# Patient Record
Sex: FEMALE | Race: WHITE | Employment: FULL TIME | URBAN - METROPOLITAN AREA
[De-identification: names, ages, dates, MRNs, and addresses within clinical notes are randomized per-mention and may not be internally consistent; named-entity substitution may affect disease eponyms.]

---

## 2021-06-25 PROBLEM — Z34.80 PRENATAL CARE OF MULTIGRAVIDA, ANTEPARTUM: Status: ACTIVE | Noted: 2021-06-25

## 2021-07-12 ENCOUNTER — HOSPITAL ENCOUNTER (EMERGENCY)
Age: 31
Discharge: HOME | End: 2021-07-12
Attending: EMERGENCY MEDICINE
Payer: COMMERCIAL

## 2021-07-12 VITALS
DIASTOLIC BLOOD PRESSURE: 68 MMHG | SYSTOLIC BLOOD PRESSURE: 116 MMHG | BODY MASS INDEX: 30.22 KG/M2 | HEART RATE: 88 BPM | WEIGHT: 188 LBS | OXYGEN SATURATION: 100 % | RESPIRATION RATE: 16 BRPM | HEIGHT: 66 IN | TEMPERATURE: 98 F

## 2021-07-12 DIAGNOSIS — O21.9 NAUSEA AND VOMITING DURING PREGNANCY: Primary | ICD-10-CM

## 2021-07-12 LAB
ALBUMIN SERPL-MCNC: 3.4 G/DL (ref 3.5–5)
ALBUMIN/GLOB SERPL: 0.7 {RATIO} (ref 1.2–3.5)
ALP SERPL-CCNC: 51 U/L (ref 50–136)
ALT SERPL-CCNC: 28 U/L (ref 12–65)
ANION GAP SERPL CALC-SCNC: 11 MMOL/L (ref 7–16)
AST SERPL-CCNC: 18 U/L (ref 15–37)
BACTERIA URNS QL MICRO: 0 /HPF
BASOPHILS # BLD: 0 K/UL (ref 0–0.2)
BASOPHILS NFR BLD: 0 % (ref 0–2)
BILIRUB SERPL-MCNC: 0.4 MG/DL (ref 0.2–1.1)
BUN SERPL-MCNC: 8 MG/DL (ref 6–23)
CALCIUM SERPL-MCNC: 8.7 MG/DL (ref 8.3–10.4)
CASTS URNS QL MICRO: 0 /LPF
CHLORIDE SERPL-SCNC: 102 MMOL/L (ref 98–107)
CO2 SERPL-SCNC: 24 MMOL/L (ref 21–32)
CREAT SERPL-MCNC: 0.67 MG/DL (ref 0.6–1)
DIFFERENTIAL METHOD BLD: NORMAL
EOSINOPHIL # BLD: 0.1 K/UL (ref 0–0.8)
EOSINOPHIL NFR BLD: 1 % (ref 0.5–7.8)
EPI CELLS #/AREA URNS HPF: NORMAL /HPF
ERYTHROCYTE [DISTWIDTH] IN BLOOD BY AUTOMATED COUNT: 12.2 % (ref 11.9–14.6)
GLOBULIN SER CALC-MCNC: 4.6 G/DL (ref 2.3–3.5)
GLUCOSE SERPL-MCNC: 70 MG/DL (ref 65–100)
HCT VFR BLD AUTO: 43 % (ref 35.8–46.3)
HGB BLD-MCNC: 14.4 G/DL (ref 11.7–15.4)
IMM GRANULOCYTES # BLD AUTO: 0 K/UL (ref 0–0.5)
IMM GRANULOCYTES NFR BLD AUTO: 0 % (ref 0–5)
LIPASE SERPL-CCNC: 145 U/L (ref 73–393)
LYMPHOCYTES # BLD: 4 K/UL (ref 0.5–4.6)
LYMPHOCYTES NFR BLD: 39 % (ref 13–44)
MAGNESIUM SERPL-MCNC: 2.2 MG/DL (ref 1.8–2.4)
MCH RBC QN AUTO: 30.1 PG (ref 26.1–32.9)
MCHC RBC AUTO-ENTMCNC: 33.5 G/DL (ref 31.4–35)
MCV RBC AUTO: 89.8 FL (ref 79.6–97.8)
MONOCYTES # BLD: 0.8 K/UL (ref 0.1–1.3)
MONOCYTES NFR BLD: 7 % (ref 4–12)
NEUTS SEG # BLD: 5.4 K/UL (ref 1.7–8.2)
NEUTS SEG NFR BLD: 53 % (ref 43–78)
NRBC # BLD: 0 K/UL (ref 0–0.2)
PLATELET # BLD AUTO: 251 K/UL (ref 150–450)
PMV BLD AUTO: 9.6 FL (ref 9.4–12.3)
POTASSIUM SERPL-SCNC: 3.5 MMOL/L (ref 3.5–5.1)
PROT SERPL-MCNC: 8 G/DL (ref 6.3–8.2)
RBC # BLD AUTO: 4.79 M/UL (ref 4.05–5.2)
RBC #/AREA URNS HPF: NORMAL /HPF
SODIUM SERPL-SCNC: 137 MMOL/L (ref 136–145)
WBC # BLD AUTO: 10.3 K/UL (ref 4.3–11.1)
WBC URNS QL MICRO: NORMAL /HPF

## 2021-07-12 PROCEDURE — 85025 COMPLETE CBC W/AUTO DIFF WBC: CPT

## 2021-07-12 PROCEDURE — 83690 ASSAY OF LIPASE: CPT

## 2021-07-12 PROCEDURE — 99284 EMERGENCY DEPT VISIT MOD MDM: CPT

## 2021-07-12 PROCEDURE — 81015 MICROSCOPIC EXAM OF URINE: CPT

## 2021-07-12 PROCEDURE — 96376 TX/PRO/DX INJ SAME DRUG ADON: CPT

## 2021-07-12 PROCEDURE — 83735 ASSAY OF MAGNESIUM: CPT

## 2021-07-12 PROCEDURE — 96361 HYDRATE IV INFUSION ADD-ON: CPT

## 2021-07-12 PROCEDURE — 81003 URINALYSIS AUTO W/O SCOPE: CPT

## 2021-07-12 PROCEDURE — 74011250636 HC RX REV CODE- 250/636: Performed by: PHYSICIAN ASSISTANT

## 2021-07-12 PROCEDURE — 80053 COMPREHEN METABOLIC PANEL: CPT

## 2021-07-12 PROCEDURE — 96374 THER/PROPH/DIAG INJ IV PUSH: CPT

## 2021-07-12 RX ORDER — ONDANSETRON 2 MG/ML
4 INJECTION INTRAMUSCULAR; INTRAVENOUS
Status: COMPLETED | OUTPATIENT
Start: 2021-07-12 | End: 2021-07-12

## 2021-07-12 RX ORDER — ONDANSETRON 4 MG/1
4 TABLET, ORALLY DISINTEGRATING ORAL
Qty: 12 TABLET | Refills: 0 | Status: SHIPPED | OUTPATIENT
Start: 2021-07-12 | End: 2021-07-16

## 2021-07-12 RX ORDER — SODIUM CHLORIDE 0.9 % (FLUSH) 0.9 %
5-10 SYRINGE (ML) INJECTION AS NEEDED
Status: DISCONTINUED | OUTPATIENT
Start: 2021-07-12 | End: 2021-07-12 | Stop reason: HOSPADM

## 2021-07-12 RX ORDER — SODIUM CHLORIDE 0.9 % (FLUSH) 0.9 %
5-10 SYRINGE (ML) INJECTION EVERY 8 HOURS
Status: DISCONTINUED | OUTPATIENT
Start: 2021-07-12 | End: 2021-07-12 | Stop reason: HOSPADM

## 2021-07-12 RX ADMIN — ONDANSETRON 4 MG: 2 INJECTION INTRAMUSCULAR; INTRAVENOUS at 17:22

## 2021-07-12 RX ADMIN — ONDANSETRON 4 MG: 2 INJECTION INTRAMUSCULAR; INTRAVENOUS at 18:10

## 2021-07-12 RX ADMIN — SODIUM CHLORIDE 1000 ML: 900 INJECTION, SOLUTION INTRAVENOUS at 17:21

## 2021-07-12 NOTE — ED TRIAGE NOTES
Pt states she is 12 weeks pregnant and has been vomiting all food and liquids since last night. States she feels like she needs some fluids. Masked for triage.

## 2021-07-12 NOTE — DISCHARGE INSTRUCTIONS
Please follow-up closely with the OB/GYN. You may continue to take Unisom and vitamin B6 for nausea/vomiting. You may take Zofran every 6-8 hours as needed for nausea/vomiting. Recommend trying the disintegrating tablets as these may be easier to ingest.  Return if you develop severely worsening or emergent symptoms such as intractable vomiting, severe abdominal or pelvic pain, vaginal bleeding or any other new concerns.

## 2021-07-12 NOTE — ED PROVIDER NOTES
This patient is a 69-year-old female who is approximately 12 weeks gestation who comes in today with concern for nausea and vomiting since yesterday. Patient reports that she has had morning sickness throughout her pregnancy but over the past 24 hours she has not been able to keep any food or liquids down. She reports she took B6, Unisom and Pepcid but had minimal symptomatic relief. She has not taken any of the Zofran that she was prescribed because she has taken it in the past and felt that it did not work very well. She denies urinary frequency or dysuria or fevers. She does report that she was having abdominal cramping yesterday but was constipated and when she had a bowel movement she had no further abdominal cramping. She denies any vaginal bleeding or pelvic pain. She reports she feels dehydrated and that she may need some fluids.            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    IBS (irritable bowel syndrome)     rpt colon due 2024    Kidney stone     Shingles     TMJ (dislocation of temporomandibular joint)     b/l    Tubular adenoma of colon 06/2019       Past Surgical History:   Procedure Laterality Date    HX BREAST LUMPECTOMY Left 2000    HX LEEP PROCEDURE  2015    HX TONSIL AND ADENOIDECTOMY  2004         Family History:   Problem Relation Age of Onset    Thyroid Disease Mother     No Known Problems Father     Hypertension Maternal Aunt     Breast Cancer Maternal Aunt 61    Hypertension Maternal Uncle     Colon Cancer Maternal Uncle 48    Diabetes Maternal Grandmother     Hypertension Maternal Grandmother     Diabetes Maternal Grandfather     Hypertension Maternal Grandfather     Thyroid Disease Paternal Grandmother        Social History     Socioeconomic History    Marital status:  Spouse name: Not on file    Number of children: Not on file    Years of education: Not on file    Highest education level: Not on file   Occupational History    Not on file   Tobacco Use    Smoking status: Never Smoker    Smokeless tobacco: Never Used   Substance and Sexual Activity    Alcohol use: No    Drug use: No    Sexual activity: Yes     Partners: Male     Birth control/protection: None     Comment: Patient is pregnant   Other Topics Concern     Service Not Asked    Blood Transfusions Not Asked    Caffeine Concern No     Comment: Minimal    Occupational Exposure Not Asked    Hobby Hazards Not Asked    Sleep Concern Not Asked    Stress Concern Not Asked    Weight Concern Not Asked    Special Diet Not Asked    Back Care Not Asked    Exercise Yes    Bike Helmet Not Asked    Seat Belt Yes    Self-Exams No   Social History Narrative    Denies physical or sexual abuse     Social Determinants of Health     Financial Resource Strain:     Difficulty of Paying Living Expenses:    Food Insecurity:     Worried About Running Out of Food in the Last Year:     Ran Out of Food in the Last Year:    Transportation Needs:     Lack of Transportation (Medical):  Lack of Transportation (Non-Medical):    Physical Activity:     Days of Exercise per Week:     Minutes of Exercise per Session:    Stress:     Feeling of Stress :    Social Connections:     Frequency of Communication with Friends and Family:     Frequency of Social Gatherings with Friends and Family:     Attends Jain Services:     Active Member of Clubs or Organizations:     Attends Club or Organization Meetings:     Marital Status:    Intimate Partner Violence:     Fear of Current or Ex-Partner:     Emotionally Abused:     Physically Abused:     Sexually Abused: ALLERGIES: Amoxicillin and Sulfa (sulfonamide antibiotics)    Review of Systems   Constitutional: Negative for chills and fever.    Respiratory: Negative for cough and shortness of breath. Cardiovascular: Negative for chest pain. Gastrointestinal: Positive for constipation, nausea and vomiting. Negative for abdominal pain. Genitourinary: Negative for decreased urine volume, dyspareunia, menstrual problem, pelvic pain, vaginal bleeding and vaginal discharge. Skin: Negative for color change. All other systems reviewed and are negative. Vitals:    07/12/21 1549   BP: 102/68   Pulse: 92   Resp: 16   Temp: 98 °F (36.7 °C)   SpO2: 99%   Weight: 85.3 kg (188 lb)   Height: 5' 6\" (1.676 m)            Physical Exam  Vitals and nursing note reviewed. Constitutional:       General: She is not in acute distress. Appearance: Normal appearance. She is not ill-appearing, toxic-appearing or diaphoretic. HENT:      Head: Normocephalic and atraumatic. Eyes:      Conjunctiva/sclera: Conjunctivae normal.   Cardiovascular:      Rate and Rhythm: Normal rate and regular rhythm. Pulses: Normal pulses. Pulmonary:      Effort: Pulmonary effort is normal. No respiratory distress. Breath sounds: Normal air entry. No stridor, decreased air movement or transmitted upper airway sounds. No decreased breath sounds. Abdominal:      General: Abdomen is flat. Palpations: Abdomen is soft. Tenderness: There is no abdominal tenderness. There is no right CVA tenderness or left CVA tenderness. Skin:     General: Skin is warm and dry. Neurological:      General: No focal deficit present. Mental Status: She is alert and oriented to person, place, and time. Mental status is at baseline. MDM  Number of Diagnoses or Management Options  Nausea and vomiting during pregnancy: new and requires workup  Diagnosis management comments: 27-year-old female comes in with nausea and vomiting. She reports she is approximately 12 weeks gestation with confirmed IUP at her OB/GYN office.   She has no abdominal pain and has a benign abdominal exam.  She has no vaginal bleeding reported or fevers. She is generally well-appearing though she reports she is feeling nauseous. We will give some IV fluids and Zofran and reassess the patient. Labs were ordered and were unremarkable including negative urinalysis. Patient feels better after Zofran. Will prescribe Zofran ODT as she only has the tablets at this point. Will recommend close follow-up with the OB/GYN and return precautions were discussed. Amount and/or Complexity of Data Reviewed  Tests in the medicine section of CPT®: reviewed and ordered    Risk of Complications, Morbidity, and/or Mortality  Presenting problems: moderate  Diagnostic procedures: low  Management options: low    Patient Progress  Patient progress: stable    ED Course as of Jul 12 1810   Mon Jul 12, 2021   6631 Patient reports she is feeling improved after fluids and 1 dose of Zofran. She does have some minor nausea still, will provide another dose of Zofran. No further episodes of vomiting since patient arrived.     [AR]      ED Course User Index  [AR] Theresa Burrows, [address]       Procedures

## 2021-07-12 NOTE — ED NOTES
I have reviewed discharge instructions with the patient. The patient verbalized understanding. Patient left ED via Discharge Method: ambulatory to Home with (insert name of family/friend, self, transport family). Opportunity for questions and clarification provided. Patient given 1 scripts. To continue your aftercare when you leave the hospital, you may receive an automated call from our care team to check in on how you are doing.  This is a free service and part of our promise to provide the best care and service to meet your aftercare needs. \" If you have questions, or wish to unsubscribe from this service please call [phone].  Thank you for Choosing our [facility] Emergency Department.

## 2022-01-10 ENCOUNTER — HOSPITAL ENCOUNTER (INPATIENT)
Age: 32
LOS: 3 days | Discharge: HOME | End: 2022-01-13
Attending: OBSTETRICS & GYNECOLOGY | Admitting: OBSTETRICS & GYNECOLOGY
Payer: COMMERCIAL

## 2022-01-10 DIAGNOSIS — Z34.90 ENCOUNTER FOR INDUCTION OF LABOR: ICD-10-CM

## 2022-01-10 DIAGNOSIS — Z34.80 PRENATAL CARE OF MULTIGRAVIDA, ANTEPARTUM: ICD-10-CM

## 2022-01-10 DIAGNOSIS — K43.9 ABDOMINAL WALL HERNIA: ICD-10-CM

## 2022-01-10 PROCEDURE — 65270000029 HC RM PRIVATE

## 2022-01-11 ENCOUNTER — ANESTHESIA (OUTPATIENT)
Dept: LABOR AND DELIVERY | Age: 32
End: 2022-01-11
Payer: COMMERCIAL

## 2022-01-11 ENCOUNTER — ANESTHESIA EVENT (OUTPATIENT)
Dept: LABOR AND DELIVERY | Age: 32
End: 2022-01-11
Payer: COMMERCIAL

## 2022-01-11 PROBLEM — K43.9 ABDOMINAL WALL HERNIA: Status: ACTIVE | Noted: 2022-01-11

## 2022-01-11 PROBLEM — Z34.90 ENCOUNTER FOR INDUCTION OF LABOR: Status: ACTIVE | Noted: 2022-01-11

## 2022-01-11 LAB
ABO + RH BLD: NORMAL
BLOOD GROUP ANTIBODIES SERPL: NORMAL
ERYTHROCYTE [DISTWIDTH] IN BLOOD BY AUTOMATED COUNT: 12.8 % (ref 11.9–14.6)
HCT VFR BLD AUTO: 35.8 % (ref 35.8–46.3)
HGB BLD-MCNC: 12.1 G/DL (ref 11.7–15.4)
MCH RBC QN AUTO: 30.9 PG (ref 26.1–32.9)
MCHC RBC AUTO-ENTMCNC: 33.8 G/DL (ref 31.4–35)
MCV RBC AUTO: 91.3 FL (ref 79.6–97.8)
NRBC # BLD: 0 K/UL (ref 0–0.2)
PLATELET # BLD AUTO: 241 K/UL (ref 150–450)
PMV BLD AUTO: 11 FL (ref 9.4–12.3)
RBC # BLD AUTO: 3.92 M/UL (ref 4.05–5.2)
SPECIMEN EXP DATE BLD: NORMAL
WBC # BLD AUTO: 8.7 K/UL (ref 4.3–11.1)

## 2022-01-11 PROCEDURE — A4300 CATH IMPL VASC ACCESS PORTAL: HCPCS | Performed by: NURSE ANESTHETIST, CERTIFIED REGISTERED

## 2022-01-11 PROCEDURE — 77030014125 HC TY EPDRL BBMI -B: Performed by: NURSE ANESTHETIST, CERTIFIED REGISTERED

## 2022-01-11 PROCEDURE — 59400 OBSTETRICAL CARE: CPT | Performed by: OBSTETRICS & GYNECOLOGY

## 2022-01-11 PROCEDURE — 85027 COMPLETE CBC AUTOMATED: CPT

## 2022-01-11 PROCEDURE — 74011250637 HC RX REV CODE- 250/637: Performed by: OBSTETRICS & GYNECOLOGY

## 2022-01-11 PROCEDURE — 74011250636 HC RX REV CODE- 250/636: Performed by: NURSE ANESTHETIST, CERTIFIED REGISTERED

## 2022-01-11 PROCEDURE — 76060000078 HC EPIDURAL ANESTHESIA: Performed by: OBSTETRICS & GYNECOLOGY

## 2022-01-11 PROCEDURE — 75410000003 HC RECOV DEL/VAG/CSECN EA 0.5 HR: Performed by: OBSTETRICS & GYNECOLOGY

## 2022-01-11 PROCEDURE — 3E0S3BZ INTRODUCTION OF ANESTHETIC AGENT INTO EPIDURAL SPACE, PERCUTANEOUS APPROACH: ICD-10-PCS | Performed by: OBSTETRICS & GYNECOLOGY

## 2022-01-11 PROCEDURE — 65270000029 HC RM PRIVATE

## 2022-01-11 PROCEDURE — 74011250636 HC RX REV CODE- 250/636: Performed by: OBSTETRICS & GYNECOLOGY

## 2022-01-11 PROCEDURE — 36415 COLL VENOUS BLD VENIPUNCTURE: CPT

## 2022-01-11 PROCEDURE — 3E033VJ INTRODUCTION OF OTHER HORMONE INTO PERIPHERAL VEIN, PERCUTANEOUS APPROACH: ICD-10-PCS | Performed by: OBSTETRICS & GYNECOLOGY

## 2022-01-11 PROCEDURE — 75410000002 HC LABOR FEE PER 1 HR: Performed by: OBSTETRICS & GYNECOLOGY

## 2022-01-11 PROCEDURE — 75410000000 HC DELIVERY VAGINAL/SINGLE: Performed by: OBSTETRICS & GYNECOLOGY

## 2022-01-11 PROCEDURE — 86900 BLOOD TYPING SEROLOGIC ABO: CPT

## 2022-01-11 RX ORDER — ONDANSETRON 8 MG/1
8 TABLET, ORALLY DISINTEGRATING ORAL
Status: DISCONTINUED | OUTPATIENT
Start: 2022-01-11 | End: 2022-01-13 | Stop reason: HOSPADM

## 2022-01-11 RX ORDER — SODIUM CHLORIDE 0.9 % (FLUSH) 0.9 %
5-40 SYRINGE (ML) INJECTION AS NEEDED
Status: DISCONTINUED | OUTPATIENT
Start: 2022-01-11 | End: 2022-01-11

## 2022-01-11 RX ORDER — DIPHENHYDRAMINE HCL 25 MG
25 CAPSULE ORAL
Status: DISCONTINUED | OUTPATIENT
Start: 2022-01-11 | End: 2022-01-13 | Stop reason: HOSPADM

## 2022-01-11 RX ORDER — LIDOCAINE HYDROCHLORIDE 20 MG/ML
JELLY TOPICAL
Status: DISCONTINUED | OUTPATIENT
Start: 2022-01-11 | End: 2022-01-11 | Stop reason: HOSPADM

## 2022-01-11 RX ORDER — ROPIVACAINE HYDROCHLORIDE 2 MG/ML
INJECTION, SOLUTION EPIDURAL; INFILTRATION; PERINEURAL AS NEEDED
Status: DISCONTINUED | OUTPATIENT
Start: 2022-01-11 | End: 2022-01-11 | Stop reason: HOSPADM

## 2022-01-11 RX ORDER — FENTANYL CITRATE 50 UG/ML
INJECTION, SOLUTION INTRAMUSCULAR; INTRAVENOUS
Status: COMPLETED
Start: 2022-01-11 | End: 2022-01-11

## 2022-01-11 RX ORDER — OXYTOCIN/RINGER'S LACTATE 30/500 ML
0-20 PLASTIC BAG, INJECTION (ML) INTRAVENOUS
Status: DISCONTINUED | OUTPATIENT
Start: 2022-01-11 | End: 2022-01-11

## 2022-01-11 RX ORDER — SODIUM CHLORIDE 0.9 % (FLUSH) 0.9 %
5-40 SYRINGE (ML) INJECTION EVERY 8 HOURS
Status: DISCONTINUED | OUTPATIENT
Start: 2022-01-11 | End: 2022-01-11

## 2022-01-11 RX ORDER — OXYTOCIN/RINGER'S LACTATE 30/500 ML
1-25 PLASTIC BAG, INJECTION (ML) INTRAVENOUS
Status: DISCONTINUED | OUTPATIENT
Start: 2022-01-11 | End: 2022-01-11

## 2022-01-11 RX ORDER — BUTORPHANOL TARTRATE 2 MG/ML
1 INJECTION INTRAMUSCULAR; INTRAVENOUS
Status: DISCONTINUED | OUTPATIENT
Start: 2022-01-11 | End: 2022-01-11 | Stop reason: HOSPADM

## 2022-01-11 RX ORDER — DEXTROSE, SODIUM CHLORIDE, SODIUM LACTATE, POTASSIUM CHLORIDE, AND CALCIUM CHLORIDE 5; .6; .31; .03; .02 G/100ML; G/100ML; G/100ML; G/100ML; G/100ML
125 INJECTION, SOLUTION INTRAVENOUS CONTINUOUS
Status: DISCONTINUED | OUTPATIENT
Start: 2022-01-11 | End: 2022-01-11

## 2022-01-11 RX ORDER — ZOLPIDEM TARTRATE 5 MG/1
5 TABLET ORAL
Status: DISCONTINUED | OUTPATIENT
Start: 2022-01-11 | End: 2022-01-13 | Stop reason: HOSPADM

## 2022-01-11 RX ORDER — OXYTOCIN/RINGER'S LACTATE 30/500 ML
87.3 PLASTIC BAG, INJECTION (ML) INTRAVENOUS AS NEEDED
Status: COMPLETED | OUTPATIENT
Start: 2022-01-11 | End: 2022-01-11

## 2022-01-11 RX ORDER — IBUPROFEN 800 MG/1
800 TABLET ORAL EVERY 6 HOURS
Status: DISCONTINUED | OUTPATIENT
Start: 2022-01-11 | End: 2022-01-13 | Stop reason: HOSPADM

## 2022-01-11 RX ORDER — NALOXONE HYDROCHLORIDE 0.4 MG/ML
0.4 INJECTION, SOLUTION INTRAMUSCULAR; INTRAVENOUS; SUBCUTANEOUS AS NEEDED
Status: DISCONTINUED | OUTPATIENT
Start: 2022-01-11 | End: 2022-01-12

## 2022-01-11 RX ORDER — HYDROCODONE BITARTRATE AND ACETAMINOPHEN 5; 325 MG/1; MG/1
1 TABLET ORAL
Status: DISCONTINUED | OUTPATIENT
Start: 2022-01-11 | End: 2022-01-13 | Stop reason: HOSPADM

## 2022-01-11 RX ORDER — LIDOCAINE HYDROCHLORIDE 10 MG/ML
1 INJECTION INFILTRATION; PERINEURAL
Status: DISCONTINUED | OUTPATIENT
Start: 2022-01-11 | End: 2022-01-11 | Stop reason: HOSPADM

## 2022-01-11 RX ORDER — FENTANYL CITRATE 50 UG/ML
INJECTION, SOLUTION INTRAMUSCULAR; INTRAVENOUS AS NEEDED
Status: DISCONTINUED | OUTPATIENT
Start: 2022-01-11 | End: 2022-01-11 | Stop reason: HOSPADM

## 2022-01-11 RX ORDER — OXYTOCIN/RINGER'S LACTATE 30/500 ML
10 PLASTIC BAG, INJECTION (ML) INTRAVENOUS AS NEEDED
Status: COMPLETED | OUTPATIENT
Start: 2022-01-11 | End: 2022-01-11

## 2022-01-11 RX ORDER — DOCUSATE SODIUM 100 MG/1
100 CAPSULE, LIQUID FILLED ORAL 2 TIMES DAILY
Status: DISCONTINUED | OUTPATIENT
Start: 2022-01-11 | End: 2022-01-13 | Stop reason: HOSPADM

## 2022-01-11 RX ORDER — MINERAL OIL
120 OIL (ML) ORAL
Status: DISCONTINUED | OUTPATIENT
Start: 2022-01-11 | End: 2022-01-11 | Stop reason: HOSPADM

## 2022-01-11 RX ADMIN — DOCUSATE SODIUM 100 MG: 100 CAPSULE ORAL at 18:00

## 2022-01-11 RX ADMIN — Medication 25 MCG: at 01:44

## 2022-01-11 RX ADMIN — ROPIVACAINE HYDROCHLORIDE 8 ML: 2 INJECTION, SOLUTION EPIDURAL; INFILTRATION; PERINEURAL at 09:55

## 2022-01-11 RX ADMIN — IBUPROFEN 800 MG: 800 TABLET, FILM COATED ORAL at 18:01

## 2022-01-11 RX ADMIN — Medication 10000 MILLI-UNITS: at 11:10

## 2022-01-11 RX ADMIN — ROPIVACAINE HYDROCHLORIDE 10 ML/HR: 2 INJECTION, SOLUTION EPIDURAL; INFILTRATION at 09:55

## 2022-01-11 RX ADMIN — Medication 87.3 MILLI-UNITS/MIN: at 11:21

## 2022-01-11 RX ADMIN — Medication 2 MILLI-UNITS/MIN: at 08:18

## 2022-01-11 RX ADMIN — FENTANYL CITRATE 100 MCG: 50 INJECTION INTRAMUSCULAR; INTRAVENOUS at 09:55

## 2022-01-11 RX ADMIN — SODIUM CHLORIDE, SODIUM LACTATE, POTASSIUM CHLORIDE, CALCIUM CHLORIDE, AND DEXTROSE MONOHYDRATE 125 ML/HR: 600; 310; 30; 20; 5 INJECTION, SOLUTION INTRAVENOUS at 08:18

## 2022-01-11 NOTE — H&P
History & Physical    Name: Tatyana Ambrose MRN: [id number]  SSN: [id number]    YOB: 1990  Age: 27 y.o. Sex: female      Subjective:     Estimated Date of Delivery: 22  OB History    Para Term  AB Living   2 1 1 0 0 1   SAB IAB Ectopic Molar Multiple Live Births   0 0 0   0 1      # Outcome Date GA Lbr Rafal/2nd Weight Sex Delivery Anes PTL Lv   2 Current            1 Term 18 39w0d  7 lb 13 oz (3.544 kg) Verneice Poor       Ms. Rachel Reich is admitted with pregnancy at 38w2d for induction of labor due to umbilical hernia with increasing pain and a mass that is not reducible. Prenatal course was normal.  Please see prenatal records for details.     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    IBS (irritable bowel syndrome)     rpt colon due     Kidney stone     Shingles     TMJ (dislocation of temporomandibular joint)     b/l    Tubular adenoma of colon 2019     Past Surgical History:   Procedure Laterality Date    HX BREAST LUMPECTOMY Left 2000    HX LEEP PROCEDURE  2015    HX TONSIL AND ADENOIDECTOMY       Social History     Occupational History    Not on file   Tobacco Use    Smoking status: Never Smoker    Smokeless tobacco: Never Used   Substance and Sexual Activity    Alcohol use: No    Drug use: No    Sexual activity: Yes     Partners: Male     Birth control/protection: None     Comment: Patient is pregnant     Family History   Problem Relation Age of Onset    Thyroid Disease Mother     No Known Problems Father     Hypertension Maternal Aunt     Breast Cancer Maternal Aunt 61    Hypertension Maternal Uncle     Colon Cancer Maternal Uncle 48    Diabetes Maternal Grandmother     Hypertension Maternal Grandmother     Diabetes Maternal Grandfather     Hypertension Maternal Grandfather     Thyroid Disease Paternal Grandmother        Allergies   Allergen Reactions    Amoxicillin Hives    Sulfa (Sulfonamide Antibiotics) Hives     Prior to Admission medications    Medication Sig Start Date End Date Taking? Authorizing Provider   prenatal vit no.124/iron/folic (PRENATAL VITAMIN PO) Take  by mouth. Yes Provider, Historical   loratadine (CLARITIN PO) Take  by mouth. Yes Provider, Historical   polyethylene glycol 3350 (MIRALAX PO) Take  by mouth. Yes Provider, Historical   doxylamine succinate (UNISOM) 25 mg tablet Take 25 mg by mouth nightly as needed. Yes Provider, Historical   famotidine (PEPCID) 20 mg tablet Take 2 Tablets by mouth two (2) times a day. 6/24/21  Yes Adilene Garcia MD        Review of Systems: Pertinent items are noted in the History of Present Illness. Objective:     Vitals:  Vitals:    01/11/22 0144 01/11/22 0543 01/11/22 0805 01/11/22 0814   BP: (!) 107/58 113/61 (!) 119/57    Pulse: 77 64 78    Resp: 16 16     Temp: 98 °F (36.7 °C) 97.8 °F (36.6 °C)  97.9 °F (36.6 °C)        Physical Exam:  Patient without distress. Heart: Regular rate and rhythm  Lung: clear to auscultation throughout lung fields, no wheezes, no rales, no rhonchi and normal respiratory effort  Membranes:  Intact  Fetal Heart Rate: Reactive          Prenatal Labs:   Lab Results   Component Value Date/Time    ABO/Rh(D) O POSITIVE 01/11/2022 12:52 AM       Impression/Plan:     Principal Problem:    Encounter for induction of labor (1/11/2022)    Active Problems:    Prenatal care of multigravida, antepartum (6/25/2021)      Overview: 1)prenatal labs normal. Urine cx pending-->neg      Abdominal wall hernia (1/11/2022)         Plan: Admit for induction of labor. Group B Strep negative. Please see prenatal record. Pt has hx and exam concerning for incarcerated hernia, but no evidence of an acute abdomen.  Pt not yet 39wks, but with increasing pain there is concern for deterioration of maternal status if we were to wait until 39 wks. Hopefully the mass will reduce after delivery. D/w pt the small risk of nursery admission and O2 support for the baby.

## 2022-01-11 NOTE — LACTATION NOTE
This note was copied from a baby's chart. Mom reports baby attempted at delivery. Assisted with attempt in football and cross cradle on L. No latch. Baby uninterested. Reviewed first 24 hour expectations. Discussed feeding expectations in second day. Encouraged to try at breast.  Reviewed Breastfeeding Packet. Put baby skin to skin and encouraged to try again at breast.  Plan to assist with feeding prior to discharge. Encouraged skin to skin. If not latching well, will need to start pumping. Mom is aware.

## 2022-01-11 NOTE — L&D DELIVERY NOTE
Delivery Summary    Patient: Rita Quinonez MRN: [id number]  SSN: [id number]    YOB: 1990  Age: 27 y.o. Sex: female       Information for the patient's :  Viry Garza [[id number]]       Labor Events:    Labor: No    Steroids: None   Cervical Ripening Date/Time: 2022 1:44 AM   Cervical Ripening Type: Misoprostol   Antibiotics During Labor: No   Rupture Identifier:      Rupture Date/Time: 2022 9:15 AM   Rupture Type: AROM   Amniotic Fluid Volume: Moderate    Amniotic Fluid Description: Clear    Amniotic Fluid Odor: None    Induction: Oxytocin       Induction Date/Time:        Indications for Induction: Other(comment)    Augmentation: None   Augmentation Date/Time:      Indications for Augmentation:     Labor complications: Other (comment)   category 2 strip once completely dilated   Additional complications:        Delivery Events:  Indications For Episiotomy:     Episiotomy: None   Perineal Laceration(s): None   Repaired:     Periurethral Laceration Location:      Repaired:     Labial Laceration Location:     Repaired:     Sulcal Laceration Location:     Repaired:     Vaginal Laceration Location:     Repaired:     Cervical Laceration Location:     Repaired:     Repair Suture: None   Number of Repair Packets:     Estimated Blood Loss (ml):  ml   Quantitative Blood Loss (ml)                Delivery Date: 2022    Delivery Time: 11:05 AM  Delivery Type: Vaginal, Spontaneous  Sex:  Male    Gestational Age: 36w4d   Delivery Clinician:  Esthela Rodriguez  Living Status: Living   Delivery Location: L&D 433          APGARS  One minute Five minutes Ten minutes   Skin color: 0   1        Heart rate: 2   2        Grimace: 2   2        Muscle tone: 1   2        Breathin   2        Totals: 7   9            Presentation: Vertex    Position: Right Occiput Anterior  Resuscitation Method:  Suctioning-bulb; Tactile Stimulation     Meconium Stained: None      Cord Information: 3 Vessels  Complications: Nuchal Cord With Compressions  Cord around: head  trunk  Delayed cord clamping? Yes  Cord clamped date/time:2022 11:06 AM  Disposition of Cord Blood: Lab    Blood Gases Sent?: No    Placenta:  Date/Time: 2022 11:10 AM  Removal: Expressed      Appearance: Normal;Intact      Measurements:  Birth Weight: 6 lb 8.8 oz (2.97 kg)      Birth Length: 50.8 cm      Head Circumference: 32 cm      Chest Circumference: 33 cm     Abdominal Girth: Other Providers:   Khalida SHETTY VICTORIA J;JACK FALL;SAVANAH JOSEPH;GISELA CASTILLO;YO PAYNE;KALEIGH GODDARD, Obstetrician;Primary Nurse;Primary [place] Nurse;Charge Nurse;Neonatologist;Scrub Tech;Staff Nurse           Group B Strep: No results found for: GRBSEXT, GRBSEXT  Information for the patient's :  Hari Or [[id number]]   No results found for: ABORH, PCTABR, PCTDIG, BILI, ABORHEXT, ABORH     No results for input(s): PCO2CB, PO2CB, HCO3I, SO2I, IBD, PTEMPI, SPECTI, PHICB, ISITE, IDEV, IALLEN in the last 72 hours. I was called that pt was complete and baby was having more significant decels. Nurse reported that she felt they should go ahead and push. I was on floor and walked to the room. Pt pushed very little. Baby was delivering into nurse's hands on my arrival. Good cry on mother's abdomen. Nurses reported loose nuchal x2 and cord around body x1. Reduced 2 nuchal loops over the head.    QBL 64cc

## 2022-01-11 NOTE — LACTATION NOTE

## 2022-01-11 NOTE — PROGRESS NOTES
SBAR IN Report: Mother    Verbal report received from [address] [place] [address] (full name & credentials) on this patient, who is now being transferred from L&D (unit) for routine progression of care. The patient is not wearing a green \"Anesthesia-Duramorph\" band. Report consisted of patient's Situation, Background, Assessment and Recommendations (SBAR). [place] ID bands were compared with the identification form, and verified with the patient and transferring nurse. Information from the SBAR, Procedure Summary, Intake/Output and MAR and the [place] Report was reviewed with the transferring nurse; opportunity for questions and clarification provided.

## 2022-01-11 NOTE — PROGRESS NOTES
Michelle Farnsworth at bedside at [address] BOBBI Otero at bedside at [address] pt to sitting up on bedside at 0941. Timeout completed at 8502 with BOBBI DUMONT and myself at bedside. Test dose given at 0950. Negative reaction. Dose given at 0955. Pt assisted to lying back in left tilt position. See anesthesia record for details. See vital sign flow sheet for BP. Tolerated procedure well.

## 2022-01-11 NOTE — PROGRESS NOTES
Admission assessment complete as noted. Patient oriented to room and unit. Plan of care reviewed and patient verbalizes understanding. Questions encouraged and answered. Patent encouraged to call for needs or concerns. Safety Teaching reviewed:   1. Hand hygiene prior to handling the infant. 2. Use of bulb syringe. 3. Bracelets with matching numbers are placed on mother and infant  4. An infant security tag  [facility]) is placed on the infant's ankle and monitored  5. All OB nurses wear pink Employee badges - do not give your baby to anyone without proper identification. 6. Never leave the baby alone in the room. 7. The infant should be placed on their back to sleep. on a firm mattress. No toys should be placed in the crib. (safe sleep video offered to view)  8. Never shake the baby (video offered to view)  9. Infant fall prevention - do not sleep with the baby, and place the baby in the crib while ambulating. 8. Mother and Baby Care booklet given to Mother.

## 2022-01-11 NOTE — PROGRESS NOTES
Pt resting in room with baby and SO  She sts she can still feel a mass below the umbilicus, but no longer has pain. Will observe while she is here, and have asked her to report any changes to us.

## 2022-01-11 NOTE — PROGRESS NOTES
SBAR OUT Report: Mother    Verbal report given to Tamela Ziegler (full name & credentials) on this patient, who is now being transferred to MIU (unit) for routine progression of care. The patient is not wearing a green \"Anesthesia-Duramorph\" band. Report consisted of patient's Situation, Background, Assessment and Recommendations (SBAR). [place] ID bands were compared with the identification form, and verified with the patient and receiving nurse. Information from the SBAR and the [address] [facility] Report was reviewed with the receiving nurse; opportunity for questions and clarification provided.

## 2022-01-11 NOTE — ANESTHESIA PROCEDURE NOTES
Epidural Block    Patient location during procedure: OB  Start time: 1/11/2022 9:43 AM  End time: 1/11/2022 9:49 AM  Reason for block: labor epidural  Staffing  Performed: attending   Preanesthetic Checklist  Completed: patient identified, risks and benefits discussed, surgical consent, pre-op evaluation and timeout performed  Block Placement  Patient position: sitting  Prep: ChloraPrep  Sterility prep: cap, drape, gloves, hand and mask  Sedation level: no sedation  Patient monitoring: continuous pulse oximetry and heart rate  Approach: midline  Location: lumbar  Lumbar location: L3-L4  Epidural  Guidance: landmark technique  Needle  Needle type: Tuohy   Needle gauge: 17 G  Needle length: 10 cm  Needle insertion depth: 5 cm  Catheter type: end hole  Catheter size: 19 G  Catheter at skin depth: 10 cm  Catheter securement method: clear occlusive dressing, liquid medical adhesive and surgical tape  Test dose: negative  Assessment  Number of attempts: 1  Procedure assessment: patient tolerated procedure well with no immediate complications

## 2022-01-11 NOTE — ANESTHESIA PREPROCEDURE EVALUATION
Relevant Problems   No relevant active problems       Anesthetic History     PONV          Review of Systems / Medical History  Patient summary reviewed and pertinent labs reviewed    Pulmonary  Within defined limits                 Neuro/Psych   Within defined limits           Cardiovascular                  Exercise tolerance: >4 METS     GI/Hepatic/Renal     GERD           Endo/Other        Obesity     Other Findings              Physical Exam    Airway  Mallampati: III  TM Distance: 4 - 6 cm  Neck ROM: normal range of motion   Mouth opening: Normal     Cardiovascular    Rhythm: regular  Rate: normal         Dental         Pulmonary  Breath sounds clear to auscultation               Abdominal         Other Findings            Anesthetic Plan    ASA: 2  Anesthesia type: epidural      Post-op pain plan if not by surgeon: epidural opioid      Anesthetic plan and risks discussed with: Patient and Spouse

## 2022-01-11 NOTE — PROGRESS NOTES
No complaints.    Has epidural  Visit Vitals  /72   Pulse 72   Temp 98.1 °F (36.7 °C)   Resp 16   LMP 04/18/2021     Baseline 120/mod/repetitive mild-mod decels with quick rtn to baseline, small accels  Ctx's q3-4min  6cm per nurse    Expect VD

## 2022-01-12 PROCEDURE — 74011250637 HC RX REV CODE- 250/637: Performed by: OBSTETRICS & GYNECOLOGY

## 2022-01-12 PROCEDURE — 2709999900 HC NON-CHARGEABLE SUPPLY

## 2022-01-12 PROCEDURE — 74011250636 HC RX REV CODE- 250/636: Performed by: NURSE ANESTHETIST, CERTIFIED REGISTERED

## 2022-01-12 PROCEDURE — 65270000029 HC RM PRIVATE

## 2022-01-12 RX ORDER — POLYETHYLENE GLYCOL 3350 17 G/17G
17 POWDER, FOR SOLUTION ORAL DAILY
Status: DISCONTINUED | OUTPATIENT
Start: 2022-01-12 | End: 2022-01-13 | Stop reason: HOSPADM

## 2022-01-12 RX ADMIN — IBUPROFEN 800 MG: 800 TABLET, FILM COATED ORAL at 06:02

## 2022-01-12 RX ADMIN — Medication 1 SPRAY: at 14:48

## 2022-01-12 RX ADMIN — IBUPROFEN 800 MG: 800 TABLET, FILM COATED ORAL at 17:54

## 2022-01-12 RX ADMIN — DOCUSATE SODIUM 100 MG: 100 CAPSULE ORAL at 08:03

## 2022-01-12 RX ADMIN — POLYETHYLENE GLYCOL 3350 17 G: 17 POWDER, FOR SOLUTION ORAL at 14:48

## 2022-01-12 RX ADMIN — IBUPROFEN 800 MG: 800 TABLET, FILM COATED ORAL at 12:20

## 2022-01-12 RX ADMIN — DOCUSATE SODIUM 100 MG: 100 CAPSULE ORAL at 17:54

## 2022-01-12 RX ADMIN — IBUPROFEN 800 MG: 800 TABLET, FILM COATED ORAL at 00:22

## 2022-01-12 NOTE — PROGRESS NOTES
Chart reviewed - no needs identified. SW met with patient while social distancing w/appropriate PPE. Patient states that she experienced some mild anxiety after the birth of her first child (2018). Per patient, \"I've always had anxiety in general.\"  She has taken medication for this anxiety in the past, but \"I've come off of it with each pregnancy. \"  Patient is not currently taking any medication for her anxiety, and she feels that it's well managed for the most part. Patient states that she's receptive to resuming medication again, if needed. Patient given informational packet on  mood & anxiety disorders (resources/education). Family denies any additional needs from  at this time. Family has 's contact information should any needs/questions arise.     KG Ferrell, [address] [facility]   [phone]

## 2022-01-12 NOTE — LACTATION NOTE
This note was copied from a baby's chart. Lactation visit, baby almost 25 hours old. Has not latched. Attempts only. Discussed pumping and mom agreeable. Set up hospital pump and full instruction provided. Showed mom hands on pumping technique. Mom with  History of low milk supply with first child. Mom pumped drops from each breast. Gave to baby on finger. Discussed options, will proceed with formula supplement since baby 24 hours and mom only pumping drops so far, and history of low supply and hyperbilirubinemia in first baby. Assisted Dad with finger feeding, curved syringe. Gave baby 7ml. Baby very sleepy but did fairly well. Just bathed. Reviewed feeding plan of care. Feed every 3 hours. Attempt at the breast. Pump if no latch and feed back colostrum, formula supplement as needed. Will try to do 10ml per feed. Mom agreeable and happy with feeding plan of care. LC offered ongoing help as needed.

## 2022-01-12 NOTE — PROGRESS NOTES
Progress Note                               Patient: Alex Khoury MRN: [id number]  SSN: [id number]    YOB: 1990  Age: 27 y.o. Sex: female      Postpartum Day Number 1    Subjective:     Patient doing well postpartum without significant complaints. Voiding without difficulty. Patient reports normal lochia. Pain well controlled, voiding on her own without difficulty. Denies HA, SOB/CP, RUQ pain, Vision changes. Reports umbilical hernia still present but not nearly as painful as prior to delivery. . Objective:     Patient Vitals for the past 12 hrs:   Temp Pulse Resp BP SpO2   22 0728 97.6 °F (36.4 °C) 78 16 119/68 97 %   22 0132 97.4 °F (36.3 °C) (!) 58 16 94/60 97 %       Temp (24hrs), Av.7 °F (36.5 °C), Min:97.4 °F (36.3 °C), Max:98.1 °F (36.7 °C)      Date 22 0700 - 22 0659 22 0700 - 22 0659   Shift 9591-9851 1482-6555 24 Hour Total 4868-9735 3158-5197 24 Hour Total   INTAKE   P.O. 1000  1000        P. O. 1000  1000      Shift Total 1000  1000      OUTPUT   Urine 375  375        Urine 25  25        Urine Voided 350  350      Blood 221  221        Quantitative Blood Loss (mL) 221  221      Shift Total 596  596        404      Weight (kg)               Physical Exam:    Constitutional: She appears well-developed and well-nourished. No distress. HENT:    Head: Normocephalic and atraumatic. Cardiovascular: RRR  Pulmonary/Chest: CTAB  Abd:  S/NTTP/ND, BS normoactive, fundus firm at umbilicus; + umb hernia, TTP--no evidence of incarceration currently   Ext:  No c/c/e      Lab/Data Review:  PIH:   Recent Labs     22  0052   WBC 8.7   HGB 12.1   HCT 35.8       No results for input(s): LDH, URICA, MG, PUQ in the last 72 hours.   GBS: No results found for: GRBSEXT, GRBSEXT  Blood Type:   Lab Results   Component Value Date/Time    ABO/Rh(D) O POSITIVE 2022 12:52 AM        Assessment and Plan:     27 y.o. G2  ppd# 1 s/p  at 38w2d after IOL due to umb hernia w/ inc in pain w/ non-reducible mass:    1) PP:  Meeting all pp goals, continue routine care   2) Breast  feeding, Rh pos , Rub imm     3) Umb hernia:  FU w/ gen surg pp--referral placed today.        Signed By: Khoa Leone MD     2022

## 2022-01-12 NOTE — PROGRESS NOTES
Problem: Vaginal Delivery: Postpartum Day 1  Goal: Off Pathway (Use only if patient is Off Pathway)  Outcome: Progressing Towards Goal  Goal: Activity/Safety  Outcome: Progressing Towards Goal  Goal: Consults, if ordered  Outcome: Progressing Towards Goal  Goal: Diagnostic Test/Procedures  Outcome: Progressing Towards Goal  Goal: Discharge Planning  Outcome: Progressing Towards Goal  Goal: Medications  Outcome: Progressing Towards Goal  Goal: Treatments/Interventions/Procedures  Outcome: Progressing Towards Goal  Goal: Psychosocial  Outcome: Progressing Towards Goal  Goal: *Vital signs within defined limits  Outcome: Progressing Towards Goal  Goal: *Labs within defined limits  Outcome: Progressing Towards Goal  Goal: *Hemodynamically stable  Outcome: Progressing Towards Goal  Goal: *Optimal pain control at patient's stated goal  Outcome: Progressing Towards Goal  Goal: *Participates in infant care  Outcome: Progressing Towards Goal  Goal: *Demonstrates progressive activity  Outcome: Progressing Towards Goal  Goal: *Performs self perineal care  Outcome: Progressing Towards Goal  Goal: *Appropriate parent-infant bonding  Outcome: Progressing Towards Goal  Goal: *Tolerating diet  Outcome: Progressing Towards Goal  Goal: *Performs self breast care  Outcome: Progressing Towards Goal

## 2022-01-13 VITALS
TEMPERATURE: 98 F | HEART RATE: 66 BPM | RESPIRATION RATE: 17 BRPM | DIASTOLIC BLOOD PRESSURE: 60 MMHG | OXYGEN SATURATION: 97 % | SYSTOLIC BLOOD PRESSURE: 114 MMHG

## 2022-01-13 PROCEDURE — 74011250637 HC RX REV CODE- 250/637: Performed by: OBSTETRICS & GYNECOLOGY

## 2022-01-13 RX ORDER — IBUPROFEN 600 MG/1
600 TABLET ORAL
Qty: 60 TABLET | Refills: 0 | Status: SHIPPED | OUTPATIENT
Start: 2022-01-13

## 2022-01-13 RX ADMIN — IBUPROFEN 800 MG: 800 TABLET, FILM COATED ORAL at 00:04

## 2022-01-13 RX ADMIN — DOCUSATE SODIUM 100 MG: 100 CAPSULE ORAL at 08:41

## 2022-01-13 RX ADMIN — IBUPROFEN 800 MG: 800 TABLET, FILM COATED ORAL at 05:48

## 2022-01-13 RX ADMIN — POLYETHYLENE GLYCOL 3350 17 G: 17 POWDER, FOR SOLUTION ORAL at 08:41

## 2022-01-13 NOTE — PROGRESS NOTES
Discharge instructions completed. Patient voiced understanding. Prescription sent electronically. Patient discharged home. Ambulating.   Wheelchair refused

## 2022-01-13 NOTE — LACTATION NOTE
This note was copied from a baby's chart. Individualized Feeding Plan for Breastfeeding   Lactation Services [phone]      As much as possible, hold your baby on your chest so babys bare skin is against your bare skin with a blanket covering babys back, especially 30 minutes before it is time for baby to eat. Watch for early feeding cues such as, licking lips, sucking motions, rooting, hands to mouth. Crying is a late feeding cue. Feed your baby at least 8 times in 24 hours, or more if your baby is showing feeding cues. If baby is sleepy put baby skin to skin and watch for hunger cues. To rouse baby: unwrap, undress, massage hands, feet, & back, change diaper, gently change babys position from lying to sitting. 15-20 minutes on the first breast of active breastfeeding is considered a good feeding. Good, active breastfeeding is when baby is alert, tugging the nipple, their ear may move, and you can hear swallows. Allow baby to finish the first side before changing sides. Sleeping at the breast or only brief, light sucks should not be considered a good, full breastfeed. At each feeding:  __x__1. Do Suck Practice on finger before each feeding until sucking pattern is smooth. Try using index finger. Nail down towards tongue. __x__2. Hand Express for a few minutes prior to latching to help start milk flow. __x__3. Baby needs to NURSE WELL x 15-20 minutes on at least first breast, burp and offer 2nd breast at every feeding. If no sustained latch only attempt at breast for 10 minutes. If baby does not latch on and feed well on at least one side, you should:   __x__4. Double pump for 15 minutes with breast massage and compression. Hand express for an additional 2-3 minutes per side. Pump after each feeding attempt or poor feeding, up to 8 times per day. If you are not putting baby to the breast you need to pump 8 times a day. Pump every 3 hours. __x__5.  Give baby all of the breast milk you obtain using a straight syringe or  curved syringe. If baby does NOT have enough wet and dirty diapers per day, is jaundiced/lethargic, or has significant weight loss AND you do NOT pump enough milk for each feeding (per volume listed below), formula supplementation may need to be used. Call lactation department /pediatrician if you have concerns. AVERAGE INTAKES OF COLOSTRUM BY HEALTHY  INFANTS:  Time  Day Intake (ml per feeding)  Based on 8 feedings per day. 1st 24 hrs  1 2-10 ml  24-48 hrs  2 5-15 ml  48-72 hrs  3 15-30 ml (0.5-1 oz)  72-96 hrs  4 30-45 ml (1-1.5oz)                          5-6      45-60 ml (1.5-2oz)                           7          At least 60 ml (2 oz)    By day 7, baby will need at least 60 ml or 2 oz at each feeding based on 8 feedings per day & babys weight. (1oz = 30ml). Total milk volume needed in 24 hours by Day 7 is 16-18 oz per day based on baby's birthweight of 6-9. The more often baby eats, the less volume they need per feeding. If baby is eating more often than the minimum of 8 times per day, they may take less per feeding. Comments: Use plan as needed. Follow milk volume coming in. If pumping, suggest using olive oil or coconut oil on your nipples before pumping to help reduce the friction. Use feeding plan until follow up with pediatrician. Continue to attempt at the breast for most feeds. Pump every 3 hours if no latch. Give all pumped colostrum/breastmilk at each feeding. OUTPATIENT APPOINTMENT Suggested. Outpatient services are located on the 4th floor at [facility]. Check in at the 4th floor registration desk (the same one you used when you came to have your baby). Call for questions [phone]     Spectra S1/2:  Power on and press wavy line button to go into let-down mode. Cycle will be 70 (and cannot be changed). Cycle is the number of times the pump pulls per minute.   Fast cycling stimulates let-down, slow cycling expresses available milk. Adjust vacuum to comfort. On let-down mode max is 5 and on Expression mode max is 12. Turn vacuum up until it is a little uncomfortable and then back down until comfortable. After 2 minutes (or sooner if milk is spraying), press wavy line button again to go into expression mode. Cycle should be between 54, 50 or 46. Again, adjust vacuum to comfort.

## 2022-01-13 NOTE — PROGRESS NOTES
[address], [place] [address], [address]  [address] [facility], [place], Flora Robb, [facility]    Patient is S/P vaginal delivery at 45 2/7 weeks, IOL for umb hernia. No complaints today. Lochia < menses. No GI/ issues. No F/C. VITALS  Patient Vitals for the past 24 hrs:   Temp Pulse Resp BP SpO2   22 0744 98 °F (36.7 °C) 66 17 114/60 97 %   22 2225 98 °F (36.7 °C) 68 17 107/60 97 %   22 1514 97.9 °F (36.6 °C) 77 28 108/65 98 %        CV - RRR  LUNGS - CTA bilaterally  ABD - soft, approp tend, FF below umbilicus  EXT - tr edema bilaterally          Labs:  No results found for this or any previous visit (from the past 24 hour(s)). PPD #2      Pt is breast/bottle feeding. No issues or complaints today. Stable.   Routine PP instructions      Yakelin Mancuso MD  9:51 AM  22

## 2022-01-13 NOTE — DISCHARGE INSTRUCTIONS
Patient Education        After Your Delivery (the Postpartum Period): Care Instructions  Your Care Instructions     Congratulations on the birth of your baby. Like pregnancy, the  period can be a time of excitement, izabela, and exhaustion. You may look at your wondrous little baby and feel happy. You may also be overwhelmed by your new sleep hours and new responsibilities. At first, babies often sleep during the days and are awake at night. They do not have a pattern or routine. They may make sudden gasps, jerk themselves awake, or look like they have crossed eyes. These are all normal, and they may even make you smile. In these first weeks after delivery, try to take good care of yourself. It may take 4 to 6 weeks to feel like yourself again, and possibly longer if you had a  birth. You will likely feel very tired for several weeks. Your days will be full of ups and downs, but lots of izabela as well. Follow-up care is a key part of your treatment and safety. Be sure to make and go to all appointments, and call your doctor if you are having problems. It's also a good idea to know your test results and keep a list of the medicines you take. How can you care for yourself at home? Take care of your body after delivery  · Use pads instead of tampons for the bloody flow that may last as long as 2 weeks. · Ease cramps with ibuprofen (Advil, Motrin). · Ease soreness of hemorrhoids and the area between your vagina and rectum with ice compresses or witch hazel pads. · Ease constipation by drinking lots of fluid and eating high-fiber foods. Ask your doctor about over-the-counter stool softeners. · Cleanse yourself with a gentle squeeze of warm water from a bottle instead of wiping with toilet paper. · Take a sitz bath in warm water several times a day. · Wear a good nursing bra. Ease sore and swollen breasts with warm, wet washcloths.   · If you aren't breastfeeding, use ice rather than heat for breast soreness. · Your period may not start for several months if you are breastfeeding. You may bleed more, and longer at first, than you did before you got pregnant. · Wait until you are healed (about 4 to 6 weeks) before you have sex. Ask your doctor when it is okay for you to have sex. · Try not to travel with your baby for 5 or 6 weeks. If you take a long car trip, make frequent stops to walk around and stretch. Avoid exhaustion  · Rest every day. Try to nap when your baby naps. · Ask another adult to be with you for a few days after delivery. · Plan for  if you have other children. · Stay flexible so you can eat at odd hours and sleep when you need to. Both you and your baby are making new schedules. · Plan small trips to get out of the house. Change can make you feel less tired. · Ask for help with housework, cooking, and shopping. Remind yourself that your job is to care for your baby. Know about help for postpartum depression  · \"Baby blues\" are common for the first 1 to 2 weeks after birth. You may cry or feel sad or irritable for no reason. · Rest whenever you can. Being tired makes it harder to handle your emotions. · Go for walks with your baby. · Talk to your partner, friends, and family about your feelings. · If your symptoms last for more than a few weeks, or if you feel very depressed, ask your doctor for help. · Postpartum depression can be treated. Support groups and counseling can help. Sometimes medicine can also help. Stay healthy  · Eat healthy foods so you have more energy. · If you breastfeed, avoid drugs. If you quit smoking during pregnancy, try to stay smoke-free. If you choose to have a drink now and then, have only one drink, and limit the number of occasions that you have a drink. Wait to breastfeed at least 2 hours after you have a drink to reduce the amount of alcohol the baby may get in the milk.   · Start daily exercise after 4 to 6 weeks, but rest when you feel tired.  · Learn exercises to tone your belly. Do Kegel exercises to regain strength in your pelvic muscles. You can do these exercises while you stand or sit. ? Squeeze the same muscles you would use to stop your urine. Your belly and thighs should not move. ? Hold the squeeze for 3 seconds, and then relax for 3 seconds. ? Start with 3 seconds. Then add 1 second each week until you are able to squeeze for 10 seconds. ? Repeat the exercise 10 to 15 times for each session. Do three or more sessions each day. · Find a class for you and your baby that has an exercise time. · If you had a  birth, give yourself a bit more time before you exercise, and be careful. When should you call for help? Call 911  anytime you think you may need emergency care. For example, call if:    · You have thoughts of harming yourself, your baby, or another person.     · You passed out (lost consciousness).     · You have chest pain, are short of breath, or cough up blood.     · You have a seizure. Call your doctor now or seek immediate medical care if:    · You have severe vaginal bleeding. This means you are passing blood clots and soaking through a pad each hour for 2 or more hours.     · You are dizzy or lightheaded, or you feel like you may faint.     · You have a fever.     · You have new or more belly pain.     · You have signs of a blood clot in your leg (called a deep vein thrombosis), such as:  ? Pain in the calf, back of the knee, thigh, or groin. ? Redness and swelling in your leg or groin.     · You have signs of preeclampsia, such as:  ? Sudden swelling of your face, hands, or feet. ? New vision problems (such as dimness, blurring, or seeing spots). ? A severe headache.    Watch closely for changes in your health, and be sure to contact your doctor if:    · Your vaginal bleeding seems to be getting heavier.     · You have new or worse vaginal discharge.     · You feel sad, anxious, or hopeless for more than a few days.     · You do not get better as expected. Where can you learn more? Go to http://www.[employer].com/  Enter A461 in the search box to learn more about \"After Your Delivery (the Postpartum Period): Care Instructions. \"  Current as of: June 16, 2021               Content Version: 13.0  © 5733-5355 [employer]. Care instructions adapted under license by [employer] (which disclaims liability or warranty for this information). If you have questions about a medical condition or this instruction, always ask your healthcare professional. [address] any warranty or liability for your use of this information.

## 2022-01-13 NOTE — PROGRESS NOTES
Problem: Vaginal Delivery: Postpartum Day 1  Goal: Off Pathway (Use only if patient is Off Pathway)  Outcome: Resolved/Met  Goal: Activity/Safety  Outcome: Resolved/Met  Goal: Consults, if ordered  Outcome: Resolved/Met  Goal: Diagnostic Test/Procedures  Outcome: Resolved/Met  Goal: Nutrition/Diet  Outcome: Resolved/Met  Goal: Discharge Planning  Outcome: Resolved/Met  Goal: Medications  Outcome: Resolved/Met  Goal: Treatments/Interventions/Procedures  Outcome: Resolved/Met  Goal: Psychosocial  Outcome: Resolved/Met  Goal: *Vital signs within defined limits  Outcome: Resolved/Met  Goal: *Labs within defined limits  Outcome: Resolved/Met  Goal: *Hemodynamically stable  Outcome: Resolved/Met  Goal: *Optimal pain control at patient's stated goal  Outcome: Resolved/Met  Goal: *Participates in infant care  Outcome: Resolved/Met  Goal: *Demonstrates progressive activity  Outcome: Resolved/Met  Goal: *Performs self perineal care  Outcome: Resolved/Met  Goal: *Appropriate parent-infant bonding  Outcome: Resolved/Met  Goal: *Tolerating diet  Outcome: Resolved/Met  Goal: *Performs self breast care  Outcome: Resolved/Met

## 2022-01-14 NOTE — DISCHARGE SUMMARY
[place] [id number], [id number]  [address], [place], [facility]  Date of Admission:  1/10/2022 11:15 PM  Date of Discharge:  2022  1:04 PM    Encounter Diagnoses   Name Primary?  Encounter for induction of labor     Prenatal care of multigravida, antepartum     Abdominal wall hernia         Nancy Place 27 y.o.  presented at 38w2d for induction. Pt had  without incident. See delivery note for all delivery details. Pt's PP course was uneventful. On day of D/C, she was ambulating well, afebrile, with lochia < menses. She was discharged home with medications as below. Pt was breast/bottle feeding on discharge. Routine PP instructions given to patient.   She is to follow up with [facility] in 6 weeks for PP exam.    Discharge Medication List as of 2022 11:17 AM      START taking these medications    Details   ibuprofen (MOTRIN) 600 mg tablet Take 1 Tablet by mouth every six (6) hours as needed for Pain., Normal, Disp-60 Tablet, R-0         CONTINUE these medications which have NOT CHANGED    Details   prenatal vit no.124/iron/folic (PRENATAL VITAMIN PO) Take  by mouth., Historical Med      loratadine (CLARITIN PO) Take  by mouth., Historical Med      polyethylene glycol 3350 (MIRALAX PO) Take  by mouth., Historical Med      doxylamine succinate (UNISOM) 25 mg tablet Take 25 mg by mouth nightly as needed., Historical Med      famotidine (PEPCID) 20 mg tablet Take 2 Tablets by mouth two (2) times a day., Normal, Disp-60 Tablet, R-12             Mitul Perea MD  11:01 AM  22

## 2022-02-22 PROBLEM — Z34.80 PRENATAL CARE OF MULTIGRAVIDA, ANTEPARTUM: Status: RESOLVED | Noted: 2021-06-25 | Resolved: 2022-02-22

## 2022-02-22 PROBLEM — O21.9 NAUSEA AND VOMITING DURING PREGNANCY: Status: RESOLVED | Noted: 2021-07-12 | Resolved: 2022-02-22

## 2022-02-22 PROBLEM — Z34.90 ENCOUNTER FOR INDUCTION OF LABOR: Status: RESOLVED | Noted: 2022-01-11 | Resolved: 2022-02-22

## 2022-02-24 NOTE — H&P (VIEW-ONLY)
Vik Li MD, [facility], [address] [place] [address]  Callum Lopez  Phone [phone]   Fax [phone]      Date of visit: 2/24/2022     Primary/Requesting provider: Elida Chamorro DO   Referred by Dr Enrique Corona   Patient presents with    New Patient     umbilical hernia          HISTORY OF PRESENT ILLNESS  Nick Glass is a 27 y.o. female. HPI  Patient is a 27 y.o. female who presents for discussion of her umbilical hernia. She reports noting a painful lump below her umbilicus about 1 week prior to delivery of her second child on 1/11. This was tender initially, but increased to become a constant pain which led to induction of her delivery. She reports the lump persisted, although it then appeared to be at the base of the umbilicus, but the discomfort was much less for several weeks. For the past 6 weeks, however, the pain has been escalating and she now desires intervention. She is not breastfeeding, and she denies any prior abdominal surgery. Medications:   Current Outpatient Medications   Medication Sig    ibuprofen (MOTRIN) 600 mg tablet Take 1 Tablet by mouth every six (6) hours as needed for Pain.  loratadine (CLARITIN PO) Take  by mouth.  polyethylene glycol 3350 (MIRALAX PO) Take  by mouth.  doxylamine succinate (UNISOM) 25 mg tablet Take 25 mg by mouth nightly as needed. No current facility-administered medications for this visit. Allergies: Allergies   Allergen Reactions    Amoxicillin Hives    Sulfa (Sulfonamide Antibiotics) Hives        Past History:  Past Medical History:   Diagnosis Date    Abnormal Papanicolaou smear of cervix 2015    treated with leep.  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    IBS (irritable bowel syndrome)     rpt colon due 2024    Kidney stone     Shingles     TMJ (dislocation of temporomandibular joint)     b/l    Tubular adenoma of colon 06/2019      Past Surgical History:   Procedure Laterality Date    HX BREAST LUMPECTOMY Left 2000    HX LEEP PROCEDURE  2015    HX TONSIL AND ADENOIDECTOMY  2004        Family and Social History:  Family History   Problem Relation Age of Onset    Thyroid Disease Mother     No Known Problems Father     Hypertension Maternal Aunt     Breast Cancer Maternal Aunt 61    Hypertension Maternal Uncle     Colon Cancer Maternal Uncle 48    Diabetes Maternal Grandmother     Hypertension Maternal Grandmother     Diabetes Maternal Grandfather     Hypertension Maternal Grandfather     Thyroid Disease Paternal Grandmother      Social History     Socioeconomic History    Marital status:      Spouse name: Not on file    Number of children: 1    Years of education: Not on file    Highest education level: Not on file   Occupational History    Not on file   Tobacco Use    Smoking status: Never Smoker    Smokeless tobacco: Never Used   Substance and Sexual Activity    Alcohol use: No    Drug use: No    Sexual activity: Not Currently     Partners: Male     Birth control/protection: None   Other Topics Concern     Service Not Asked    Blood Transfusions Not Asked    Caffeine Concern No     Comment: Minimal    Occupational Exposure Not Asked    Hobby Hazards Not Asked    Sleep Concern Not Asked    Stress Concern Not Asked    Weight Concern Not Asked    Special Diet Not Asked    Back Care Not Asked    Exercise Yes    Bike Helmet Not Asked    Seat Belt Yes    Self-Exams No   Social History Narrative    Denies physical or sexual abuse     Social Determinants of Health     Financial Resource Strain:     Difficulty of Paying Living Expenses: Not on file   Food Insecurity:     Worried About Running Out of Food in the Last Year: Not on file    Jayashree of Food in the Last Year: Not on file   Transportation Needs:     Lack of Transportation (Medical): Not on file    Lack of Transportation (Non-Medical): Not on file   Physical Activity:     Days of Exercise per Week: Not on file    Minutes of Exercise per Session: Not on file   Stress:     Feeling of Stress : Not on file   Social Connections:     Frequency of Communication with Friends and Family: Not on file    Frequency of Social Gatherings with Friends and Family: Not on file    Attends Bahai Services: Not on file    Active Member of [address] [place] [address] or Organizations: Not on file    Attends Club or Organization Meetings: Not on file    Marital Status: Not on file   Intimate Partner Violence:     Fear of Current or Ex-Partner: Not on file    Emotionally Abused: Not on file    Physically Abused: Not on file    Sexually Abused: Not on file   Housing Stability:     Unable to Pay for Housing in the Last Year: Not on file    Number of [place] in the Last Year: Not on file    Unstable Housing in the Last Year: Not on file       Review of Systems   Constitutional: Negative. HENT: Negative. Eyes: Negative. Respiratory: Negative. Cardiovascular: Negative. Gastrointestinal: Negative. Genitourinary: Negative. Musculoskeletal: Negative. Skin:        Umbilical pain   Neurological: Negative. Endo/Heme/Allergies: Negative. Psychiatric/Behavioral: Negative. Physical Exam  Vitals and nursing note reviewed. Constitutional:       Appearance: She is well-developed. She is not toxic-appearing or diaphoretic. HENT:      Head: Normocephalic and atraumatic. Eyes:      General: No scleral icterus. Conjunctiva/sclera: Conjunctivae normal.      Pupils: Pupils are equal, round, and reactive to light. Neck:      Thyroid: No thyromegaly. Vascular: No JVD. Trachea: No tracheal deviation. Cardiovascular:      Rate and Rhythm: Normal rate and regular rhythm.       Heart sounds: No murmur heard.  No friction rub. No gallop. Pulmonary:      Effort: Pulmonary effort is normal. No respiratory distress. Breath sounds: Normal breath sounds. No wheezing or rales. Abdominal:      General: There is no distension. Palpations: Abdomen is soft. There is no mass. Hernia: A hernia (tender nodule at umbilicus c/w preperitoneal fat. some residual post-partum diastasis) is present. Musculoskeletal:      Cervical back: Normal range of motion. Comments: No gross deformities   Skin:     General: Skin is warm. Neurological:      General: No focal deficit present. Mental Status: She is alert and oriented to person, place, and time. Cranial Nerves: No cranial nerve deficit. Psychiatric:         Mood and Affect: Mood normal.         Behavior: Behavior normal. Behavior is cooperative. Thought Content: Thought content normal.         Judgment: Judgment normal.         ASSESSMENT and PLAN  Encounter Diagnoses   Name Primary?  Umbilical hernia without obstruction and without gangrene Yes     Due to increasing discomfort, she desires repair. Discussed mesh vs primary repair in women of child-bearing age; she is 80% sure she is unlikely to have more children. Will proceed with  umbilical hernia repair with mesh. Technical details of the procedure are reviewed. Risks reviewed include risks of anesthesia, bleeding, infection, visceral injury, persistent post-operative pain, and hernia recurrence. All questions are answered.

## 2022-03-16 ENCOUNTER — ANESTHESIA EVENT (OUTPATIENT)
Dept: SURGERY | Age: 32
End: 2022-03-16
Payer: COMMERCIAL

## 2022-03-17 ENCOUNTER — ANESTHESIA (OUTPATIENT)
Dept: SURGERY | Age: 32
End: 2022-03-17
Payer: COMMERCIAL

## 2022-03-17 ENCOUNTER — HOSPITAL ENCOUNTER (OUTPATIENT)
Age: 32
Setting detail: OUTPATIENT SURGERY
Discharge: HOME | End: 2022-03-17
Attending: SURGERY | Admitting: SURGERY
Payer: COMMERCIAL

## 2022-03-17 VITALS
BODY MASS INDEX: 32.21 KG/M2 | OXYGEN SATURATION: 91 % | DIASTOLIC BLOOD PRESSURE: 61 MMHG | HEIGHT: 66 IN | TEMPERATURE: 98 F | WEIGHT: 200.4 LBS | SYSTOLIC BLOOD PRESSURE: 104 MMHG | RESPIRATION RATE: 16 BRPM | HEART RATE: 89 BPM

## 2022-03-17 DIAGNOSIS — K42.9 UMBILICAL HERNIA WITHOUT OBSTRUCTION AND WITHOUT GANGRENE: Primary | ICD-10-CM

## 2022-03-17 LAB
HCG UR QL: NEGATIVE
HGB BLD-MCNC: 13.9 G/DL (ref 11.7–15.4)

## 2022-03-17 PROCEDURE — 76210000026 HC REC RM PH II 1 TO 1.5 HR: Performed by: SURGERY

## 2022-03-17 PROCEDURE — 77030039425 HC BLD LARYNG TRULITE DISP TELE -A: Performed by: STUDENT IN AN ORGANIZED HEALTH CARE EDUCATION/TRAINING PROGRAM

## 2022-03-17 PROCEDURE — 74011250637 HC RX REV CODE- 250/637: Performed by: STUDENT IN AN ORGANIZED HEALTH CARE EDUCATION/TRAINING PROGRAM

## 2022-03-17 PROCEDURE — 76210000006 HC OR PH I REC 0.5 TO 1 HR: Performed by: SURGERY

## 2022-03-17 PROCEDURE — 77030002996 HC SUT SLK J&J -A: Performed by: SURGERY

## 2022-03-17 PROCEDURE — 76010000160 HC OR TIME 0.5 TO 1 HR INTENSV-TIER 1: Performed by: SURGERY

## 2022-03-17 PROCEDURE — 77030040506 HC DRN WND MDII -A: Performed by: SURGERY

## 2022-03-17 PROCEDURE — 74011250636 HC RX REV CODE- 250/636: Performed by: STUDENT IN AN ORGANIZED HEALTH CARE EDUCATION/TRAINING PROGRAM

## 2022-03-17 PROCEDURE — 74011250636 HC RX REV CODE- 250/636

## 2022-03-17 PROCEDURE — 2709999900 HC NON-CHARGEABLE SUPPLY: Performed by: SURGERY

## 2022-03-17 PROCEDURE — 85018 HEMOGLOBIN: CPT

## 2022-03-17 PROCEDURE — 77030019908 HC STETH ESOPH SIMS -A: Performed by: STUDENT IN AN ORGANIZED HEALTH CARE EDUCATION/TRAINING PROGRAM

## 2022-03-17 PROCEDURE — 77030031139 HC SUT VCRL2 J&J -A: Performed by: SURGERY

## 2022-03-17 PROCEDURE — C1781 MESH (IMPLANTABLE): HCPCS | Performed by: SURGERY

## 2022-03-17 PROCEDURE — 88302 TISSUE EXAM BY PATHOLOGIST: CPT

## 2022-03-17 PROCEDURE — 76060000032 HC ANESTHESIA 0.5 TO 1 HR: Performed by: SURGERY

## 2022-03-17 PROCEDURE — 81025 URINE PREGNANCY TEST: CPT

## 2022-03-17 PROCEDURE — 74011000250 HC RX REV CODE- 250

## 2022-03-17 PROCEDURE — 77030002986 HC SUT PROL J&J -A: Performed by: SURGERY

## 2022-03-17 PROCEDURE — 77030037088 HC TUBE ENDOTRACH ORAL NSL COVD-A: Performed by: STUDENT IN AN ORGANIZED HEALTH CARE EDUCATION/TRAINING PROGRAM

## 2022-03-17 PROCEDURE — 74011000250 HC RX REV CODE- 250: Performed by: SURGERY

## 2022-03-17 PROCEDURE — 49585 PR REPAIR UMBILICAL HERN,5+Y/O,REDUC: CPT | Performed by: SURGERY

## 2022-03-17 PROCEDURE — 77030040922 HC BLNKT HYPOTHRM STRY -A: Performed by: STUDENT IN AN ORGANIZED HEALTH CARE EDUCATION/TRAINING PROGRAM

## 2022-03-17 PROCEDURE — 74011250636 HC RX REV CODE- 250/636: Performed by: SURGERY

## 2022-03-17 DEVICE — BARD VENTRALEX HERNIA PATCH, 6.4 CM (2.5"), MEDIUM CIRCLE WITH STRAP
Type: IMPLANTABLE DEVICE | Site: UMBILICAL | Status: FUNCTIONAL
Brand: VENTRALEX

## 2022-03-17 RX ORDER — PROPOFOL 10 MG/ML
INJECTION, EMULSION INTRAVENOUS AS NEEDED
Status: DISCONTINUED | OUTPATIENT
Start: 2022-03-17 | End: 2022-03-17 | Stop reason: HOSPADM

## 2022-03-17 RX ORDER — HYDROCODONE BITARTRATE AND ACETAMINOPHEN 7.5; 325 MG/1; MG/1
1 TABLET ORAL
Qty: 20 TABLET | Refills: 0 | Status: SHIPPED | OUTPATIENT
Start: 2022-03-17 | End: 2022-03-22

## 2022-03-17 RX ORDER — SODIUM CHLORIDE 0.9 % (FLUSH) 0.9 %
5-40 SYRINGE (ML) INJECTION EVERY 8 HOURS
Status: DISCONTINUED | OUTPATIENT
Start: 2022-03-17 | End: 2022-03-17 | Stop reason: HOSPADM

## 2022-03-17 RX ORDER — LIDOCAINE HYDROCHLORIDE 10 MG/ML
0.1 INJECTION INFILTRATION; PERINEURAL AS NEEDED
Status: DISCONTINUED | OUTPATIENT
Start: 2022-03-17 | End: 2022-03-17 | Stop reason: HOSPADM

## 2022-03-17 RX ORDER — CLINDAMYCIN PHOSPHATE 900 MG/50ML
900 INJECTION INTRAVENOUS
Status: COMPLETED | OUTPATIENT
Start: 2022-03-17 | End: 2022-03-17

## 2022-03-17 RX ORDER — LIDOCAINE HYDROCHLORIDE 20 MG/ML
INJECTION, SOLUTION EPIDURAL; INFILTRATION; INTRACAUDAL; PERINEURAL AS NEEDED
Status: DISCONTINUED | OUTPATIENT
Start: 2022-03-17 | End: 2022-03-17 | Stop reason: HOSPADM

## 2022-03-17 RX ORDER — NALOXONE HYDROCHLORIDE 0.4 MG/ML
0.1 INJECTION, SOLUTION INTRAMUSCULAR; INTRAVENOUS; SUBCUTANEOUS AS NEEDED
Status: DISCONTINUED | OUTPATIENT
Start: 2022-03-17 | End: 2022-03-17 | Stop reason: HOSPADM

## 2022-03-17 RX ORDER — ONDANSETRON 2 MG/ML
INJECTION INTRAMUSCULAR; INTRAVENOUS AS NEEDED
Status: DISCONTINUED | OUTPATIENT
Start: 2022-03-17 | End: 2022-03-17 | Stop reason: HOSPADM

## 2022-03-17 RX ORDER — FLUMAZENIL 0.1 MG/ML
0.2 INJECTION INTRAVENOUS
Status: DISCONTINUED | OUTPATIENT
Start: 2022-03-17 | End: 2022-03-17 | Stop reason: HOSPADM

## 2022-03-17 RX ORDER — ROCURONIUM BROMIDE 10 MG/ML
INJECTION, SOLUTION INTRAVENOUS AS NEEDED
Status: DISCONTINUED | OUTPATIENT
Start: 2022-03-17 | End: 2022-03-17 | Stop reason: HOSPADM

## 2022-03-17 RX ORDER — FENTANYL CITRATE 50 UG/ML
INJECTION, SOLUTION INTRAMUSCULAR; INTRAVENOUS AS NEEDED
Status: DISCONTINUED | OUTPATIENT
Start: 2022-03-17 | End: 2022-03-17 | Stop reason: HOSPADM

## 2022-03-17 RX ORDER — SODIUM CHLORIDE 0.9 % (FLUSH) 0.9 %
5-40 SYRINGE (ML) INJECTION AS NEEDED
Status: DISCONTINUED | OUTPATIENT
Start: 2022-03-17 | End: 2022-03-17 | Stop reason: HOSPADM

## 2022-03-17 RX ORDER — DIPHENHYDRAMINE HYDROCHLORIDE 50 MG/ML
12.5 INJECTION, SOLUTION INTRAMUSCULAR; INTRAVENOUS
Status: DISCONTINUED | OUTPATIENT
Start: 2022-03-17 | End: 2022-03-17 | Stop reason: HOSPADM

## 2022-03-17 RX ORDER — DEXAMETHASONE SODIUM PHOSPHATE 4 MG/ML
INJECTION, SOLUTION INTRA-ARTICULAR; INTRALESIONAL; INTRAMUSCULAR; INTRAVENOUS; SOFT TISSUE AS NEEDED
Status: DISCONTINUED | OUTPATIENT
Start: 2022-03-17 | End: 2022-03-17 | Stop reason: HOSPADM

## 2022-03-17 RX ORDER — HYDROMORPHONE HYDROCHLORIDE 2 MG/ML
0.5 INJECTION, SOLUTION INTRAMUSCULAR; INTRAVENOUS; SUBCUTANEOUS
Status: DISCONTINUED | OUTPATIENT
Start: 2022-03-17 | End: 2022-03-17 | Stop reason: HOSPADM

## 2022-03-17 RX ORDER — OXYCODONE HYDROCHLORIDE 5 MG/1
5 TABLET ORAL
Status: COMPLETED | OUTPATIENT
Start: 2022-03-17 | End: 2022-03-17

## 2022-03-17 RX ORDER — SODIUM CHLORIDE, SODIUM LACTATE, POTASSIUM CHLORIDE, CALCIUM CHLORIDE 600; 310; 30; 20 MG/100ML; MG/100ML; MG/100ML; MG/100ML
100 INJECTION, SOLUTION INTRAVENOUS CONTINUOUS
Status: DISCONTINUED | OUTPATIENT
Start: 2022-03-17 | End: 2022-03-17 | Stop reason: HOSPADM

## 2022-03-17 RX ORDER — CLINDAMYCIN PHOSPHATE 600 MG/50ML
600 INJECTION INTRAVENOUS
Status: DISCONTINUED | OUTPATIENT
Start: 2022-03-17 | End: 2022-03-17

## 2022-03-17 RX ORDER — CLINDAMYCIN PHOSPHATE 900 MG/50ML
900 INJECTION, SOLUTION INTRAVENOUS
Status: DISCONTINUED | OUTPATIENT
Start: 2022-03-17 | End: 2022-03-17

## 2022-03-17 RX ORDER — MIDAZOLAM HYDROCHLORIDE 1 MG/ML
2 INJECTION, SOLUTION INTRAMUSCULAR; INTRAVENOUS
Status: COMPLETED | OUTPATIENT
Start: 2022-03-17 | End: 2022-03-17

## 2022-03-17 RX ORDER — BUPIVACAINE HYDROCHLORIDE AND EPINEPHRINE 2.5; 5 MG/ML; UG/ML
INJECTION, SOLUTION EPIDURAL; INFILTRATION; INTRACAUDAL; PERINEURAL AS NEEDED
Status: DISCONTINUED | OUTPATIENT
Start: 2022-03-17 | End: 2022-03-17 | Stop reason: HOSPADM

## 2022-03-17 RX ADMIN — DEXAMETHASONE SODIUM PHOSPHATE 4 MG: 4 INJECTION, SOLUTION INTRAMUSCULAR; INTRAVENOUS at 15:33

## 2022-03-17 RX ADMIN — OXYCODONE 5 MG: 5 TABLET ORAL at 16:25

## 2022-03-17 RX ADMIN — DIPHENHYDRAMINE HYDROCHLORIDE 12.5 MG: 50 INJECTION, SOLUTION INTRAMUSCULAR; INTRAVENOUS at 17:21

## 2022-03-17 RX ADMIN — MIDAZOLAM 2 MG: 1 INJECTION INTRAMUSCULAR; INTRAVENOUS at 14:23

## 2022-03-17 RX ADMIN — LIDOCAINE HYDROCHLORIDE 100 MG: 20 INJECTION, SOLUTION EPIDURAL; INFILTRATION; INTRACAUDAL; PERINEURAL at 15:23

## 2022-03-17 RX ADMIN — ROCURONIUM BROMIDE 50 MG: 10 INJECTION, SOLUTION INTRAVENOUS at 15:25

## 2022-03-17 RX ADMIN — SUGAMMADEX 200 MG: 100 INJECTION, SOLUTION INTRAVENOUS at 15:56

## 2022-03-17 RX ADMIN — FENTANYL CITRATE 100 MCG: 50 INJECTION INTRAMUSCULAR; INTRAVENOUS at 15:23

## 2022-03-17 RX ADMIN — SODIUM CHLORIDE, SODIUM LACTATE, POTASSIUM CHLORIDE, AND CALCIUM CHLORIDE: 600; 310; 30; 20 INJECTION, SOLUTION INTRAVENOUS at 15:50

## 2022-03-17 RX ADMIN — HYDROMORPHONE HYDROCHLORIDE 0.5 MG: 2 INJECTION INTRAMUSCULAR; INTRAVENOUS; SUBCUTANEOUS at 16:14

## 2022-03-17 RX ADMIN — ONDANSETRON 4 MG: 2 INJECTION INTRAMUSCULAR; INTRAVENOUS at 15:33

## 2022-03-17 RX ADMIN — HYDROMORPHONE HYDROCHLORIDE 0.5 MG: 2 INJECTION INTRAMUSCULAR; INTRAVENOUS; SUBCUTANEOUS at 16:24

## 2022-03-17 RX ADMIN — SODIUM CHLORIDE, SODIUM LACTATE, POTASSIUM CHLORIDE, AND CALCIUM CHLORIDE 100 ML/HR: 600; 310; 30; 20 INJECTION, SOLUTION INTRAVENOUS at 13:48

## 2022-03-17 RX ADMIN — FENTANYL CITRATE 50 MCG: 50 INJECTION INTRAMUSCULAR; INTRAVENOUS at 15:43

## 2022-03-17 RX ADMIN — PROPOFOL 200 MG: 10 INJECTION, EMULSION INTRAVENOUS at 15:24

## 2022-03-17 RX ADMIN — CLINDAMYCIN PHOSPHATE 900 MG: 900 INJECTION, SOLUTION INTRAVENOUS at 15:30

## 2022-03-17 NOTE — OP NOTES
Operative Report    Patient: Sree Us MRN: [id number]     YOB: 1990  Age: 27 y.o. Sex: female       Date of Surgery: 3/17/2022     Preoperative Diagnosis: umbilical hernia    Postoperative Diagnosis: umbilical hernia    Procedure:  Umbilical Hernia Repair with mesh     Anesthesia: General     Complications: none    EBL: minimal    Indications:  As outlined in History and Physical.    Procedure Details   Informed consent was obtained and the patient was brought to the operating room and placed on the table in a supine position. After successful induction of anesthesia, the abdomen was prepped and draped in the standard fashion. A curvilinear incision was made from the 3 o'clock to the 9 o'clock position and cautery and blunt dissection was used to identify and isolate the hernia stalk from the surrounding tissues. The umbilical skin was then dissected off of the contents and reflected superiorly. A firm mass was noted at the hernia site, emanating from the lower margin of the fascial defect, potentially representing a urachal cyst.  The hernia and nodule tissue was then amputated at the fascial level, revealing a 1.5cm fascial defect. Digital exploration of the abdominal wall did not reveal additional defects and did not reveal adhesions. The tissue quality of the fascia was noted to be fair- persistent, focal postpartum diastasis was noted approximately 8-7EH above umbilicus. A medium Ventralex patch was thus brought to the field and inserted through the defect with care taken to avoid entrapment of any visceral structures and assured to be completely unfurled. The positioning straps were then trimmed and sutured to the sides of the defect with 2-0 Prolene. This was also used to close the defect transversely over the mesh.   The area was then irrigated and confirmed hemostatic and infiltrated with additional local.  The umbilical skin was then tacked to the fascia with 3-0 vicryl to recreate the umbilicus,   and then closed with subcuticular 4-0 Vicryl. Steri-Strips, a tonsil sponge,  telfa, and a tegaderm dressing was applied as a pressure dressing. The patient tolerated the procedure well and was awakened from anesthesia and taken to recovery in satisfactory condition. Sponge, needle, and instrument counts were correct as reported to me.     Rosa Maria Colmenares MD  March 17, 2022

## 2022-03-17 NOTE — ANESTHESIA POSTPROCEDURE EVALUATION
Procedure(s):  OPEN UMBILICAL HERNIA W/MESH. general    Anesthesia Post Evaluation      Multimodal analgesia: multimodal analgesia used between 6 hours prior to anesthesia start to PACU discharge  Patient location during evaluation: PACU  Patient participation: complete - patient participated  Level of consciousness: awake  Pain management: adequate  Airway patency: patent  Anesthetic complications: no  Cardiovascular status: acceptable and hemodynamically stable  Respiratory status: acceptable  Hydration status: acceptable  Comments: Acceptable for discharge from PACU.   Post anesthesia nausea and vomiting:  none  Final Post Anesthesia Temperature Assessment:  Normothermia (36.0-37.5 degrees C)      INITIAL Post-op Vital signs:   Vitals Value Taken Time   /55 03/17/22 1710   Temp 36.7 °C (98 °F) 03/17/22 1655   Pulse 76 03/17/22 1710   Resp 16 03/17/22 1710   SpO2 95 % 03/17/22 1710

## 2022-03-17 NOTE — DISCHARGE INSTRUCTIONS
Alfredo Razo M.D.  [phone]    Instructions following Hernia Repair:    ACTIVITY:   Try to take a few short walks with help around the house later today. It is very important to take short walks to avoid blood clots and pneumonia.  You may be light-headed or sleepy from anesthesia, so be careful going up and down stairs. Avoid any activity that involves lifting/pushing more than 30 pounds until your followup appointment  DIET:   Drink only clear, non-carbonated liquids when you first get home (sugar-free if you are diabetic), such as Gatorade, chicken broth, etc.   Later  you may resume a more normal diet, depending on how you feel   Using Miralax or other over the counter laxative is ok if you develop constipation    PAIN:   You will be given a prescription for pain medication.  Try to take the pain medication with food, even a few crackers.  You may also use Tylenol, Motrin, Advil, or Aleve instead of the prescription pain medication. Do no take Tylenol and the prescription pain medication within 6 hours of each other.  URINARY RETENTION: If you are unable to empty your bladder within 6 hours after returning home, please go to your nearest Emergency Department or Urgent Care for urinary catheterization. WOUND CARE:   You may shower the day after surgery, unless instructed otherwise.  It is not uncommon for the incisions to ooze or drain blood-tinged fluid. You may remove the clear dressing on the fifth postoperative day.  Incisions will sometimes develop redness around them, up to the size of a quarter, as well as a hard lumpy feel. If this redness continues to get larger, please call the office. FOLLOW UP:   Your follow-up appointment is usually made when your surgery is arranged. Please call the office if you are not sure of this appointment. CALL THE DOCTOR IF:   You have a temperature higher than 101.5° Fahrenheit for more than 6 hours.    You have severe nausea or vomiting.  You develop increasing redness or infection at the incision. Continue home medications as previously prescribed. MEDICATION INTERACTION:  During your procedure you potentially received a medication or medications which may reduce the effectiveness of oral contraceptives. Please consider other forms of contraception for 1 month following your procedure if you are currently using oral contraceptives as your primary form of birth control. In addition to this, we recommend continuing your oral contraceptive as prescribed, unless otherwise instructed by your physician, during this time    After general anesthesia or intravenous sedation, for 24 hours or while taking prescription Narcotics:  · Limit your activities  · A responsible adult needs to be with you for the next 24 hours  · Do not drive and operate hazardous machinery  · Do not make important personal or business decisions  · Do not drink alcoholic beverages  · If you have not urinated within 8 hours after discharge, and you are experiencing discomfort from urinary retention, please go to the nearest ED. · If you have sleep apnea and have a CPAP machine, please use it for all naps and sleeping. · Please use caution when taking narcotics and any of your home medications that may cause drowsiness. *  Please give a list of your current medications to your Primary Care Provider. *  Please update this list whenever your medications are discontinued, doses are      changed, or new medications (including over-the-counter products) are added. *  Please carry medication information at all times in case of emergency situations. These are general instructions for a healthy lifestyle:  No smoking/ No tobacco products/ Avoid exposure to second hand smoke  Surgeon General's Warning:  Quitting smoking now greatly reduces serious risk to your health.   Obesity, smoking, and sedentary lifestyle greatly increases your risk for illness  A healthy diet, regular physical exercise & weight monitoring are important for maintaining a healthy lifestyle    You may be retaining fluid if you have a history of heart failure or if you experience any of the following symptoms:  Weight gain of 3 pounds or more overnight or 5 pounds in a week, increased swelling in our hands or feet or shortness of breath while lying flat in bed. Please call your doctor as soon as you notice any of these symptoms; do not wait until your next office visit.

## 2022-03-17 NOTE — INTERVAL H&P NOTE
Update History & Physical    The Patient's History and Physical of  was reviewed with the patient and I examined the patient. There was no change. The surgical site was confirmed by the patient and me. Plan:  The risk, benefits, expected outcome, and alternative to the recommended procedure have been discussed with the patient. Patient understands and wants to proceed with the procedure.     Electronically signed by Moisés Barrera MD on 3/17/2022 at 2:48 PM

## 2022-03-17 NOTE — ANESTHESIA PREPROCEDURE EVALUATION
Relevant Problems   No relevant active problems       Anesthetic History   No history of anesthetic complications            Review of Systems / Medical History  Patient summary reviewed and pertinent labs reviewed    Pulmonary  Within defined limits                 Neuro/Psych   Within defined limits           Cardiovascular  Within defined limits                Exercise tolerance: >4 METS     GI/Hepatic/Renal     GERD: well controlled           Endo/Other        Obesity     Other Findings              Physical Exam    Airway  Mallampati: II  TM Distance: 4 - 6 cm  Neck ROM: normal range of motion   Mouth opening: Normal     Cardiovascular    Rhythm: regular  Rate: normal         Dental  No notable dental hx       Pulmonary  Breath sounds clear to auscultation               Abdominal         Other Findings            Anesthetic Plan    ASA: 2  Anesthesia type: general          Induction: Intravenous  Anesthetic plan and risks discussed with: Patient and Spouse

## 2022-03-18 PROBLEM — K43.9 ABDOMINAL WALL HERNIA: Status: ACTIVE | Noted: 2022-01-11

## 2022-03-30 PROBLEM — K43.9 ABDOMINAL WALL HERNIA: Status: RESOLVED | Noted: 2022-01-11 | Resolved: 2022-03-30

## 2022-05-08 ENCOUNTER — APPOINTMENT (OUTPATIENT)
Dept: CT IMAGING | Age: 32
End: 2022-05-08
Attending: EMERGENCY MEDICINE
Payer: COMMERCIAL

## 2022-05-08 ENCOUNTER — HOSPITAL ENCOUNTER (EMERGENCY)
Age: 32
Discharge: HOME | End: 2022-05-08
Attending: EMERGENCY MEDICINE
Payer: COMMERCIAL

## 2022-05-08 VITALS
SYSTOLIC BLOOD PRESSURE: 112 MMHG | BODY MASS INDEX: 32.78 KG/M2 | HEIGHT: 66 IN | OXYGEN SATURATION: 98 % | RESPIRATION RATE: 16 BRPM | HEART RATE: 69 BPM | DIASTOLIC BLOOD PRESSURE: 68 MMHG | WEIGHT: 204 LBS | TEMPERATURE: 97.7 F

## 2022-05-08 DIAGNOSIS — K92.2 LOWER GI BLEEDING: Primary | ICD-10-CM

## 2022-05-08 LAB
ABO + RH BLD: NORMAL
ALBUMIN SERPL-MCNC: 3.8 G/DL (ref 3.5–5)
ALBUMIN/GLOB SERPL: 0.9 {RATIO} (ref 1.2–3.5)
ALP SERPL-CCNC: 75 U/L (ref 50–130)
ALT SERPL-CCNC: 36 U/L (ref 12–65)
ANION GAP SERPL CALC-SCNC: 7 MMOL/L (ref 7–16)
AST SERPL-CCNC: 22 U/L (ref 15–37)
BASOPHILS # BLD: 0 K/UL (ref 0–0.2)
BASOPHILS NFR BLD: 0 % (ref 0–2)
BILIRUB SERPL-MCNC: 0.4 MG/DL (ref 0.2–1.1)
BLOOD GROUP ANTIBODIES SERPL: NORMAL
BUN SERPL-MCNC: 13 MG/DL (ref 6–23)
CALCIUM SERPL-MCNC: 8.8 MG/DL (ref 8.3–10.4)
CHLORIDE SERPL-SCNC: 110 MMOL/L (ref 98–107)
CO2 SERPL-SCNC: 25 MMOL/L (ref 21–32)
CREAT SERPL-MCNC: 0.91 MG/DL (ref 0.6–1)
DIFFERENTIAL METHOD BLD: NORMAL
EOSINOPHIL # BLD: 0.1 K/UL (ref 0–0.8)
EOSINOPHIL NFR BLD: 1 % (ref 0.5–7.8)
ERYTHROCYTE [DISTWIDTH] IN BLOOD BY AUTOMATED COUNT: 12.4 % (ref 11.9–14.6)
GLOBULIN SER CALC-MCNC: 4.1 G/DL (ref 2.3–3.5)
GLUCOSE SERPL-MCNC: 95 MG/DL (ref 65–100)
HCG UR QL: NEGATIVE
HCT VFR BLD AUTO: 43.3 % (ref 35.8–46.3)
HGB BLD-MCNC: 14.8 G/DL (ref 11.7–15.4)
IMM GRANULOCYTES # BLD AUTO: 0 K/UL (ref 0–0.5)
IMM GRANULOCYTES NFR BLD AUTO: 0 % (ref 0–5)
LIPASE SERPL-CCNC: 157 U/L (ref 73–393)
LYMPHOCYTES # BLD: 2.1 K/UL (ref 0.5–4.6)
LYMPHOCYTES NFR BLD: 30 % (ref 13–44)
MCH RBC QN AUTO: 29.5 PG (ref 26.1–32.9)
MCHC RBC AUTO-ENTMCNC: 34.2 G/DL (ref 31.4–35)
MCV RBC AUTO: 86.4 FL (ref 79.6–97.8)
MONOCYTES # BLD: 0.6 K/UL (ref 0.1–1.3)
MONOCYTES NFR BLD: 8 % (ref 4–12)
NEUTS SEG # BLD: 4.3 K/UL (ref 1.7–8.2)
NEUTS SEG NFR BLD: 61 % (ref 43–78)
NRBC # BLD: 0 K/UL (ref 0–0.2)
PLATELET # BLD AUTO: 219 K/UL (ref 150–450)
PMV BLD AUTO: 9.9 FL (ref 9.4–12.3)
POTASSIUM SERPL-SCNC: 3.9 MMOL/L (ref 3.5–5.1)
PROT SERPL-MCNC: 7.9 G/DL (ref 6.3–8.2)
RBC # BLD AUTO: 5.01 M/UL (ref 4.05–5.2)
SODIUM SERPL-SCNC: 142 MMOL/L (ref 136–145)
SPECIMEN EXP DATE BLD: NORMAL
WBC # BLD AUTO: 7 K/UL (ref 4.3–11.1)

## 2022-05-08 PROCEDURE — 80053 COMPREHEN METABOLIC PANEL: CPT

## 2022-05-08 PROCEDURE — 74011000258 HC RX REV CODE- 258: Performed by: EMERGENCY MEDICINE

## 2022-05-08 PROCEDURE — 74177 CT ABD & PELVIS W/CONTRAST: CPT

## 2022-05-08 PROCEDURE — 74011000636 HC RX REV CODE- 636: Performed by: EMERGENCY MEDICINE

## 2022-05-08 PROCEDURE — 99285 EMERGENCY DEPT VISIT HI MDM: CPT

## 2022-05-08 PROCEDURE — 81003 URINALYSIS AUTO W/O SCOPE: CPT

## 2022-05-08 PROCEDURE — 81025 URINE PREGNANCY TEST: CPT

## 2022-05-08 PROCEDURE — 86900 BLOOD TYPING SEROLOGIC ABO: CPT

## 2022-05-08 PROCEDURE — 85025 COMPLETE CBC W/AUTO DIFF WBC: CPT

## 2022-05-08 PROCEDURE — 83690 ASSAY OF LIPASE: CPT

## 2022-05-08 RX ORDER — SODIUM CHLORIDE 0.9 % (FLUSH) 0.9 %
5-10 SYRINGE (ML) INJECTION EVERY 8 HOURS
Status: DISCONTINUED | OUTPATIENT
Start: 2022-05-08 | End: 2022-05-08 | Stop reason: HOSPADM

## 2022-05-08 RX ORDER — SODIUM CHLORIDE 0.9 % (FLUSH) 0.9 %
5-10 SYRINGE (ML) INJECTION AS NEEDED
Status: DISCONTINUED | OUTPATIENT
Start: 2022-05-08 | End: 2022-05-08 | Stop reason: HOSPADM

## 2022-05-08 RX ORDER — SODIUM CHLORIDE 0.9 % (FLUSH) 0.9 %
10 SYRINGE (ML) INJECTION
Status: COMPLETED | OUTPATIENT
Start: 2022-05-08 | End: 2022-05-08

## 2022-05-08 RX ADMIN — SODIUM CHLORIDE 100 ML: 900 INJECTION, SOLUTION INTRAVENOUS at 08:47

## 2022-05-08 RX ADMIN — Medication 10 ML: at 08:47

## 2022-05-08 RX ADMIN — IOPAMIDOL 100 ML: 755 INJECTION, SOLUTION INTRAVENOUS at 08:46

## 2022-05-08 NOTE — ED NOTES
Per MD Sabi Morrell micro not needed for blood in urine dip. Pt ambulatory to bathroom w steady gait.

## 2022-05-08 NOTE — ED TRIAGE NOTES
Patient with 2 episodes of diarrhea last night starting around 2300, around 0500 woke up and states bright red blood rectally with small amount of stool. Patient with hernia repair 3/17, lower abdominal cramping states her period started yesterday as well. Masked.

## 2022-05-08 NOTE — ED PROVIDER NOTES
Patient presents ER with complaints of diarrhea and bloody stool. Patient states symptoms started last evening with acute onset of crampy abdominal pain with diarrhea. States diarrhea initially was just loose and watery. States at 5 AM this morning she woke up with passage of red blood in stool. States since then she has had the urge to go to the bathroom but does not produce much. States when she wipes she sees red blood. Reports occasional lower crampy abdominal pain. Denies any fevers or chills. Denies any vomiting. Denies any recent antibiotic usage. Denies any travel outside the country. States she had a similar episode approximate 3 years ago. He did have a colonoscopy and was told she had a \"benign polyp\". The history is provided by the patient. Diarrhea   This is a new problem. The current episode started 3 to 5 hours ago. The problem occurs constantly. The problem has not changed since onset. The pain is located in the generalized abdominal region. The quality of the pain is aching and cramping. The pain is at a severity of 4/10. The pain is mild. Associated symptoms include hematochezia and nausea. Pertinent negatives include no anorexia, no fever, no belching, no melena, no vomiting, no hematuria, no headaches, no myalgias, no chest pain and no back pain. Her past medical history is significant for irritable bowel syndrome. Past Medical History:   Diagnosis Date    Abdominal wall hernia 1/83/3769     Umbilical Hernia Repair with mesh Dr. Clarice Andrea 3/17/22.  Abnormal Papanicolaou smear of cervix 2015    treated with leep.  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    IBS (irritable bowel syndrome)     rpt colon due 2024    Kidney stone     Shingles     TMJ (dislocation of temporomandibular joint)     b/l    Tubular adenoma of colon 06/2019       Past Surgical History:   Procedure Laterality Date    HX BREAST LUMPECTOMY Left 2000    HX HERNIA REPAIR  03/17/2022    w/ mesh    HX LEEP PROCEDURE  2015    HX TONSIL AND ADENOIDECTOMY  2004         Family History:   Problem Relation Age of Onset    Thyroid Disease Mother     No Known Problems Father     Hypertension Maternal Aunt     Breast Cancer Maternal Aunt 61    Hypertension Maternal Uncle     Colon Cancer Maternal Uncle 48    Diabetes Maternal Grandmother     Hypertension Maternal Grandmother     Diabetes Maternal Grandfather     Hypertension Maternal Grandfather     Thyroid Disease Paternal Grandmother        Social History     Socioeconomic History    Marital status:      Spouse name: Not on file    Number of children: 1    Years of education: Not on file    Highest education level: Not on file   Occupational History    Not on file   Tobacco Use    Smoking status: Never Smoker    Smokeless tobacco: Never Used   Substance and Sexual Activity    Alcohol use: No    Drug use: No    Sexual activity: Not Currently     Partners: Male     Birth control/protection: None   Other Topics Concern     Service Not Asked    Blood Transfusions Not Asked    Caffeine Concern No     Comment: Minimal    Occupational Exposure Not Asked    Hobby Hazards Not Asked    Sleep Concern Not Asked    Stress Concern Not Asked    Weight Concern Not Asked    Special Diet Not Asked    Back Care Not Asked    Exercise Yes    Bike Helmet Not Asked    Seat Belt Yes    Self-Exams No   Social History Narrative    Denies physical or sexual abuse     Social Determinants of Health     Financial Resource Strain:     Difficulty of Paying Living Expenses: Not on file   Food Insecurity:     Worried About Running Out of Food in the Last Year: Not on file    Jayashree of Food in the Last Year: Not on file   Transportation Needs:     Lack of Transportation (Medical):  Not on file  Lack of Transportation (Non-Medical): Not on file   Physical Activity:     Days of Exercise per Week: Not on file    Minutes of Exercise per Session: Not on file   Stress:     Feeling of Stress : Not on file   Social Connections:     Frequency of Communication with Friends and Family: Not on file    Frequency of Social Gatherings with Friends and Family: Not on file    Attends [employer] Services: Not on file    Active Member of [address] [place] [address] or Organizations: Not on file    Attends Club or Organization Meetings: Not on file    Marital Status: Not on file   Intimate Partner Violence:     Fear of Current or Ex-Partner: Not on file    Emotionally Abused: Not on file    Physically Abused: Not on file    Sexually Abused: Not on file   Housing Stability:     Unable to Pay for Housing in the Last Year: Not on file    Number of [place] in the Last Year: Not on file    Unstable Housing in the Last Year: Not on file         ALLERGIES: Amoxicillin and Sulfa (sulfonamide antibiotics)    Review of Systems   Constitutional: Negative for fatigue and fever. HENT: Negative for congestion, dental problem and voice change. Eyes: Negative for photophobia and redness. Respiratory: Negative for choking and chest tightness. Cardiovascular: Negative for chest pain and leg swelling. Gastrointestinal: Positive for blood in stool, hematochezia and nausea. Negative for anorexia, melena and vomiting. Endocrine: Negative for polyphagia and polyuria. Genitourinary: Negative for hematuria, pelvic pain and urgency. Musculoskeletal: Negative for back pain, joint swelling and myalgias. Skin: Negative for color change and pallor. Neurological: Negative for syncope, speech difficulty and headaches. Hematological: Negative for adenopathy. Does not bruise/bleed easily. Psychiatric/Behavioral: Negative for behavioral problems and confusion. All other systems reviewed and are negative.       Vitals:    05/08/22 0721   BP: 118/84   Pulse: 100   Resp: 16   Temp: 97.9 °F (36.6 °C)   SpO2: 99%   Weight: 92.5 kg (204 lb)   Height: 5' 6\" (1.676 m)            Physical Exam  Vitals and nursing note reviewed. Constitutional:       General: She is not in acute distress. Appearance: Normal appearance. She is not ill-appearing. HENT:      Head: Normocephalic and atraumatic. Right Ear: External ear normal.      Left Ear: External ear normal.      Nose: Nose normal. No congestion or rhinorrhea. Mouth/Throat:      Mouth: Mucous membranes are moist.      Pharynx: No oropharyngeal exudate. Eyes:      General:         Right eye: No discharge. Left eye: No discharge. Extraocular Movements: Extraocular movements intact. Pupils: Pupils are equal, round, and reactive to light. Cardiovascular:      Rate and Rhythm: Normal rate and regular rhythm. Pulses: Normal pulses. Heart sounds: Normal heart sounds. Pulmonary:      Effort: Pulmonary effort is normal. No respiratory distress. Breath sounds: Normal breath sounds. No stridor. No wheezing. Abdominal:      General: Bowel sounds are normal. There is no distension. Palpations: There is no mass. Musculoskeletal:         General: No deformity. Normal range of motion. Cervical back: Normal range of motion and neck supple. Skin:     Capillary Refill: Capillary refill takes less than 2 seconds. Coloration: Skin is not jaundiced or pale. Findings: No bruising or erythema. Neurological:      General: No focal deficit present. Mental Status: She is alert and oriented to person, place, and time. Cranial Nerves: No cranial nerve deficit. Sensory: No sensory deficit. Psychiatric:         Mood and Affect: Mood normal.          MDM  Number of Diagnoses or Management Options  Diagnosis management comments: No focal abdominal tenderness on exam.  Plan for screening labs here including hemoglobin hematocrit.   Will monitor symptoms. 9:51 AM  Hemoglobin hematocrit is actually stable. Hemoglobin actually improved to 14.8, previous was 13.9. Did obtain CT scan of abdomen pelvis that shows no acute abnormalities. Particularly no signs of colitis or diverticulitis. Symptomatically patient feels improved. Has not had any more passing  of regular bowel. Discussed the need for follow-up with GI. Encouraged PCP follow-up as well. Monitoring symptoms as well         Amount and/or Complexity of Data Reviewed  Clinical lab tests: ordered and reviewed    Risk of Complications, Morbidity, and/or Mortality  Presenting problems: moderate  Diagnostic procedures: moderate  Management options: moderate    Patient Progress  Patient progress: stable         Procedures                 Results Include:    Recent Results (from the past 24 hour(s))   CBC WITH AUTOMATED DIFF    Collection Time: 05/08/22  7:40 AM   Result Value Ref Range    WBC 7.0 4.3 - 11.1 K/uL    RBC 5.01 4.05 - 5.2 M/uL    HGB 14.8 11.7 - 15.4 g/dL    HCT 43.3 35.8 - 46.3 %    MCV 86.4 79.6 - 97.8 FL    MCH 29.5 26.1 - 32.9 PG    MCHC 34.2 31.4 - 35.0 g/dL    RDW 12.4 11.9 - 14.6 %    PLATELET 912 661 - 699 K/uL    MPV 9.9 9.4 - 12.3 FL    ABSOLUTE NRBC 0.00 0.0 - 0.2 K/uL    DF AUTOMATED      NEUTROPHILS 61 43 - 78 %    LYMPHOCYTES 30 13 - 44 %    MONOCYTES 8 4.0 - 12.0 %    EOSINOPHILS 1 0.5 - 7.8 %    BASOPHILS 0 0.0 - 2.0 %    IMMATURE GRANULOCYTES 0 0.0 - 5.0 %    ABS. NEUTROPHILS 4.3 1.7 - 8.2 K/UL    ABS. LYMPHOCYTES 2.1 0.5 - 4.6 K/UL    ABS. MONOCYTES 0.6 0.1 - 1.3 K/UL    ABS. EOSINOPHILS 0.1 0.0 - 0.8 K/UL    ABS. BASOPHILS 0.0 0.0 - 0.2 K/UL    ABS. IMM.  GRANS. 0.0 0.0 - 0.5 K/UL   METABOLIC PANEL, COMPREHENSIVE    Collection Time: 05/08/22  7:40 AM   Result Value Ref Range    Sodium 142 136 - 145 mmol/L    Potassium 3.9 3.5 - 5.1 mmol/L    Chloride 110 (H) 98 - 107 mmol/L    CO2 25 21 - 32 mmol/L    Anion gap 7 7 - 16 mmol/L    Glucose 95 65 - 100 mg/dL BUN 13 6 - 23 MG/DL    Creatinine 0.91 0.6 - 1.0 MG/DL    GFR est AA >60 >60 ml/min/1.73m2    GFR est non-AA >60 >60 ml/min/1.73m2    Calcium 8.8 8.3 - 10.4 MG/DL    Bilirubin, total 0.4 0.2 - 1.1 MG/DL    ALT (SGPT) 36 12 - 65 U/L    AST (SGOT) 22 15 - 37 U/L    Alk. phosphatase 75 50 - 130 U/L    Protein, total 7.9 6.3 - 8.2 g/dL    Albumin 3.8 3.5 - 5.0 g/dL    Globulin 4.1 (H) 2.3 - 3.5 g/dL    A-G Ratio 0.9 (L) 1.2 - 3.5     LIPASE    Collection Time: 05/08/22  7:40 AM   Result Value Ref Range    Lipase 157 73 - 393 U/L   TYPE & SCREEN    Collection Time: 05/08/22  7:40 AM   Result Value Ref Range    Crossmatch Expiration 05/11/2022,2359     ABO/Rh(D) Vikram [place] POSITIVE     Antibody screen NEG    HCG URINE, QL. - POC    Collection Time: 05/08/22  7:48 AM   Result Value Ref Range    Pregnancy test,urine (POC) Negative NEG       Voice dictation software was used during the making of this note. This software is not perfect and grammatical and other typographical errors may be present. This note has been proofread, but may still contain errors. Jose Dale MD; 5/8/2022 @9:53 AM   ===================================================================    I wore appropriate PPE throughout this patient's ED visit.  Jose Dale MD, 9:53 AM

## 2022-05-08 NOTE — DISCHARGE INSTRUCTIONS
As we discussed, your testing done today shows no signs of any serious life-threatening illnesses  Encouraged her to stick to a soft, bland diet  Monitor your symptoms at home  Arrange follow-up with gastroenterology  Return to the ER for any new, worsening or life-threatening symptoms

## 2022-05-08 NOTE — ED NOTES
I have reviewed discharge instructions with the patient. The patient verbalized understanding. Patient left ED via Discharge Method: ambulatory to Home with parent. Opportunity for questions and clarification provided. Patient given 0 scripts. To continue your aftercare when you leave the hospital, you may receive an automated call from our care team to check in on how you are doing. This is a free service and part of our promise to provide the best care and service to meet your aftercare needs.  If you have questions, or wish to unsubscribe from this service please call [phone]. Thank you for Choosing our [facility] Emergency Department.

## 2022-05-23 ENCOUNTER — TELEPHONE (OUTPATIENT)
Dept: OBGYN CLINIC | Age: 32
End: 2022-05-23

## 2022-06-13 ENCOUNTER — TELEMEDICINE (OUTPATIENT)
Dept: FAMILY MEDICINE CLINIC | Facility: CLINIC | Age: 32
End: 2022-06-13
Payer: COMMERCIAL

## 2022-06-13 DIAGNOSIS — F32.1 CURRENT MODERATE EPISODE OF MAJOR DEPRESSIVE DISORDER WITHOUT PRIOR EPISODE (HCC): Primary | ICD-10-CM

## 2022-06-13 PROBLEM — K58.9 IBS (IRRITABLE BOWEL SYNDROME): Status: ACTIVE | Noted: 2022-06-13

## 2022-06-13 PROCEDURE — 99214 OFFICE O/P EST MOD 30 MIN: CPT | Performed by: FAMILY MEDICINE

## 2022-06-13 RX ORDER — BUPROPION HYDROCHLORIDE 150 MG/1
150 TABLET ORAL EVERY MORNING
Qty: 30 TABLET | Refills: 1 | Status: SHIPPED | OUTPATIENT
Start: 2022-06-13 | End: 2022-07-27 | Stop reason: SDUPTHER

## 2022-06-13 ASSESSMENT — PATIENT HEALTH QUESTIONNAIRE - PHQ9
SUM OF ALL RESPONSES TO PHQ QUESTIONS 1-9: 12
1. LITTLE INTEREST OR PLEASURE IN DOING THINGS: 0
SUM OF ALL RESPONSES TO PHQ QUESTIONS 1-9: 12
5. POOR APPETITE OR OVEREATING: 3
7. TROUBLE CONCENTRATING ON THINGS, SUCH AS READING THE NEWSPAPER OR WATCHING TELEVISION: 0
9. THOUGHTS THAT YOU WOULD BE BETTER OFF DEAD, OR OF HURTING YOURSELF: 0
2. FEELING DOWN, DEPRESSED OR HOPELESS: 2
SUM OF ALL RESPONSES TO PHQ QUESTIONS 1-9: 12
4. FEELING TIRED OR HAVING LITTLE ENERGY: 3
6. FEELING BAD ABOUT YOURSELF - OR THAT YOU ARE A FAILURE OR HAVE LET YOURSELF OR YOUR FAMILY DOWN: 2
10. IF YOU CHECKED OFF ANY PROBLEMS, HOW DIFFICULT HAVE THESE PROBLEMS MADE IT FOR YOU TO DO YOUR WORK, TAKE CARE OF THINGS AT HOME, OR GET ALONG WITH OTHER PEOPLE: 1
SUM OF ALL RESPONSES TO PHQ QUESTIONS 1-9: 12
SUM OF ALL RESPONSES TO PHQ9 QUESTIONS 1 & 2: 2
3. TROUBLE FALLING OR STAYING ASLEEP: 2
8. MOVING OR SPEAKING SO SLOWLY THAT OTHER PEOPLE COULD HAVE NOTICED. OR THE OPPOSITE, BEING SO FIGETY OR RESTLESS THAT YOU HAVE BEEN MOVING AROUND A LOT MORE THAN USUAL: 0

## 2022-06-13 NOTE — PROGRESS NOTES
Here today for IUD check. Placed by me on 5/13 without any issues. Current bleeding light spotting     Vitals:    06/14/22 1241   BP: 112/74      Pelvic: IUD strings visible 3 cm from cervical os     A/p:  Contraception/IUD Check:   IUD strings visualized. RTC for annual unless problems arise. Has seen PCP for pp depression (not tolerant to SSRI) and has started Wellbutrin. Has FU with psych.      Tri Manriquez, DO

## 2022-06-13 NOTE — PROGRESS NOTES
Mario Hernandez is a 34 y.o. female who was seen by synchronous (real-time) audio-video technology on 6/13/2022. Subjective:   Vinine Avilez was seen for Depression (spoke with OB and given psych referral has an appointment at the end of July but thinks she needs to start on something now )    Pt had second child 1/10. He's had to have hernia surgery, formula allergies. She just went back to work 5 wks ago  She is dealing with PPD. Feels like she's on edge all the time, feels pissed off. Lots of crying. Is not feeling rested despite sleeping (he is mainly sleeping through the night), feels like she could sleep all day. Tends to overeat. She's been on multiple meds for anxiety in the past. She's been on lexapro (made her feel drunk), zoloft (thinks it caused locked jaw). This is her first bout of depression. No seizures. Allergies   Allergen Reactions    Amoxicillin Hives    Sulfa Antibiotics Hives         Objective:     General: alert, cooperative, no distress and mildly obese   Mental  status: mental status: alert, oriented to person, place, and time, depressed mood, tearful. Good insight   Resp: No distress. Neuro: No acute deficits   Skin: skin: no discoloration or lesions of concern on visible areas     Due to this being a TeleHealth evaluation, many elements of the physical examination are unable to be assessed. Assessment & Plan:   Vninie Powers was seen today for depression. Diagnoses and all orders for this visit:    Current moderate episode of major depressive disorder without prior episode (HCC)  -     buPROPion (WELLBUTRIN XL) 150 MG extended release tablet; Take 1 tablet by mouth every morning        New diagnosis. Will start wellbutrin given her atypical symptoms. She has appt w/ psych end of July so she can f/u with them.  If worsening symptoms before then, f/u with me      CPT Codes 59275-95563 for Established Patients may apply to this Telehealth Visit    Rosette Simon was evaluated through a synchronous (real-time) audio-video encounter. The patient (or guardian if applicable) is aware that this is a billable service, which includes applicable co-pays. This Virtual Visit was conducted with patient's (and/or leg guardian's) consent. The visit was conducted pursuant to the emergency declaration under the [address] [place] [address] waiver authority and the [employer] Act. Patient identification was verified, and a caregiver was present when appropriate. The patient was located in a state where the provider was licensed to provide care. I was at home while conducting this encounter. Pt was at home. We discussed the expected course, resolution and complications of the diagnosis(es) in detail. Medication risks, benefits, costs, interactions, and alternatives were discussed as indicated. I advised her to contact the office if her condition worsens, changes or fails to improve as anticipated. She expressed understanding with the diagnosis(es) and plan.      Earline Delacruz, DO

## 2022-06-14 ENCOUNTER — OFFICE VISIT (OUTPATIENT)
Dept: OBGYN CLINIC | Age: 32
End: 2022-06-14
Payer: COMMERCIAL

## 2022-06-14 VITALS
SYSTOLIC BLOOD PRESSURE: 112 MMHG | BODY MASS INDEX: 33.11 KG/M2 | WEIGHT: 206 LBS | HEIGHT: 66 IN | DIASTOLIC BLOOD PRESSURE: 74 MMHG

## 2022-06-14 DIAGNOSIS — Z30.431 IUD CHECK UP: Primary | ICD-10-CM

## 2022-06-14 PROCEDURE — 99212 OFFICE O/P EST SF 10 MIN: CPT | Performed by: OBSTETRICS & GYNECOLOGY

## 2022-06-14 RX ORDER — LEVONORGESTREL 52 MG/1
1 INTRAUTERINE DEVICE INTRAUTERINE ONCE
COMMUNITY

## 2022-07-27 ENCOUNTER — TELEMEDICINE (OUTPATIENT)
Dept: BEHAVIORAL/MENTAL HEALTH CLINIC | Age: 32
End: 2022-07-27
Payer: COMMERCIAL

## 2022-07-27 DIAGNOSIS — F50.81 BINGE EATING DISORDER: ICD-10-CM

## 2022-07-27 DIAGNOSIS — F41.1 GENERALIZED ANXIETY DISORDER: ICD-10-CM

## 2022-07-27 DIAGNOSIS — F33.0 MILD EPISODE OF RECURRENT MAJOR DEPRESSIVE DISORDER (HCC): Primary | ICD-10-CM

## 2022-07-27 DIAGNOSIS — F51.02 ADJUSTMENT INSOMNIA: ICD-10-CM

## 2022-07-27 PROCEDURE — 90792 PSYCH DIAG EVAL W/MED SRVCS: CPT | Performed by: PSYCHIATRY & NEUROLOGY

## 2022-07-27 RX ORDER — DICYCLOMINE HYDROCHLORIDE 10 MG/1
CAPSULE ORAL
COMMUNITY
Start: 2022-07-10

## 2022-07-27 RX ORDER — DULOXETIN HYDROCHLORIDE 30 MG/1
30 CAPSULE, DELAYED RELEASE ORAL DAILY
Qty: 30 CAPSULE | Refills: 0 | Status: SHIPPED | OUTPATIENT
Start: 2022-07-27 | End: 2022-08-16 | Stop reason: SDUPTHER

## 2022-07-27 RX ORDER — BUPROPION HYDROCHLORIDE 150 MG/1
150 TABLET ORAL EVERY MORNING
Qty: 90 TABLET | Refills: 1 | Status: SHIPPED | OUTPATIENT
Start: 2022-07-27 | End: 2022-08-30 | Stop reason: ALTCHOICE

## 2022-07-27 ASSESSMENT — PATIENT HEALTH QUESTIONNAIRE - PHQ9
6. FEELING BAD ABOUT YOURSELF - OR THAT YOU ARE A FAILURE OR HAVE LET YOURSELF OR YOUR FAMILY DOWN: 2
SUM OF ALL RESPONSES TO PHQ QUESTIONS 1-9: 14
4. FEELING TIRED OR HAVING LITTLE ENERGY: 3
8. MOVING OR SPEAKING SO SLOWLY THAT OTHER PEOPLE COULD HAVE NOTICED. OR THE OPPOSITE, BEING SO FIGETY OR RESTLESS THAT YOU HAVE BEEN MOVING AROUND A LOT MORE THAN USUAL: 1
1. LITTLE INTEREST OR PLEASURE IN DOING THINGS: 0
10. IF YOU CHECKED OFF ANY PROBLEMS, HOW DIFFICULT HAVE THESE PROBLEMS MADE IT FOR YOU TO DO YOUR WORK, TAKE CARE OF THINGS AT HOME, OR GET ALONG WITH OTHER PEOPLE: 3
SUM OF ALL RESPONSES TO PHQ QUESTIONS 1-9: 14
3. TROUBLE FALLING OR STAYING ASLEEP: 3
2. FEELING DOWN, DEPRESSED OR HOPELESS: 1
9. THOUGHTS THAT YOU WOULD BE BETTER OFF DEAD, OR OF HURTING YOURSELF: 0
7. TROUBLE CONCENTRATING ON THINGS, SUCH AS READING THE NEWSPAPER OR WATCHING TELEVISION: 1
SUM OF ALL RESPONSES TO PHQ QUESTIONS 1-9: 14
5. POOR APPETITE OR OVEREATING: 3
SUM OF ALL RESPONSES TO PHQ9 QUESTIONS 1 & 2: 1
SUM OF ALL RESPONSES TO PHQ QUESTIONS 1-9: 14

## 2022-07-27 ASSESSMENT — ANXIETY QUESTIONNAIRES
5. BEING SO RESTLESS THAT IT IS HARD TO SIT STILL: 1
4. TROUBLE RELAXING: 3
GAD7 TOTAL SCORE: 10
1. FEELING NERVOUS, ANXIOUS, OR ON EDGE: 1
3. WORRYING TOO MUCH ABOUT DIFFERENT THINGS: 1
2. NOT BEING ABLE TO STOP OR CONTROL WORRYING: 1
7. FEELING AFRAID AS IF SOMETHING AWFUL MIGHT HAPPEN: 0
6. BECOMING EASILY ANNOYED OR IRRITABLE: 3
IF YOU CHECKED OFF ANY PROBLEMS ON THIS QUESTIONNAIRE, HOW DIFFICULT HAVE THESE PROBLEMS MADE IT FOR YOU TO DO YOUR WORK, TAKE CARE OF THINGS AT HOME, OR GET ALONG WITH OTHER PEOPLE: VERY DIFFICULT
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
## 2022-08-02 ENCOUNTER — OFFICE VISIT (OUTPATIENT)
Dept: OBGYN CLINIC | Age: 32
End: 2022-08-02
Payer: COMMERCIAL

## 2022-08-02 VITALS — WEIGHT: 195 LBS | DIASTOLIC BLOOD PRESSURE: 82 MMHG | BODY MASS INDEX: 31.47 KG/M2 | SYSTOLIC BLOOD PRESSURE: 126 MMHG

## 2022-08-02 DIAGNOSIS — N89.8 VAGINAL MASS: Primary | ICD-10-CM

## 2022-08-02 PROCEDURE — 99213 OFFICE O/P EST LOW 20 MIN: CPT | Performed by: OBSTETRICS & GYNECOLOGY

## 2022-08-02 NOTE — PROGRESS NOTES
CC:   Chief Complaint   Patient presents with    Other     Vaginal lump         HPI:    Rogelio Cai  is a 34 y.o. , No obstetric history on file., No LMP recorded. (Menstrual status: Other - See Notes). ,  who is seen for c/o vaginal lump. Says she feels it with 1 finger in and has felt her strings. No pain. GYN HISTORY:  As per HPI      Current Outpatient Medications on File Prior to Visit   Medication Sig Dispense Refill    dicyclomine (BENTYL) 10 MG capsule TAKE 1 CAPSULE BY MOUTH FOUR TIMES A DAY AS NEEDED      lisdexamfetamine (VYVANSE) 30 MG capsule Take 1 capsule by mouth every morning for 30 days. 30 capsule 0    DULoxetine (CYMBALTA) 30 MG extended release capsule Take 1 capsule by mouth in the morning. 30 capsule 0    buPROPion (WELLBUTRIN XL) 150 MG extended release tablet Take 1 tablet by mouth every morning 90 tablet 1    levonorgestrel (MIRENA, 52 MG,) IUD 52 mg 1 each by IntraUTERine route once       No current facility-administered medications on file prior to visit. Past Medical History:   Diagnosis Date    Abdominal wall hernia 8/33/4611     Umbilical Hernia Repair with mesh Dr. Jeanne Lord 3/17/22. Abnormal Papanicolaou smear of cervix 2015    treated with leep. pt does not remember the pap or leep pathology verbage    Anxiety     presents as severe nausea and diarrhea. been on lexapro, zoloft    Breast lump     Benign     Chicken pox     Chlamydia     HPV in female 2015    IBS (irritable bowel syndrome)     diarrhea. worsened with anxiety.  rpt colon due 2024    Kidney stone     PTSD (post-traumatic stress disorder)      is alcoholic, ptsd from this    Shingles     TMJ (dislocation of temporomandibular joint)     b/l    Tubular adenoma of colon 06/2019         Past Surgical History:   Procedure Laterality Date    BREAST LUMPECTOMY Left 2000    HERNIA REPAIR  03/2022    HERNIA REPAIR  77/28/8065    umbilical. w/ mesh    LEEP  2015    TONSILLECTOMY AND ADENOIDECTOMY  2004 Outpatient Encounter Medications as of 8/2/2022   Medication Sig Dispense Refill    dicyclomine (BENTYL) 10 MG capsule TAKE 1 CAPSULE BY MOUTH FOUR TIMES A DAY AS NEEDED      lisdexamfetamine (VYVANSE) 30 MG capsule Take 1 capsule by mouth every morning for 30 days. 30 capsule 0    DULoxetine (CYMBALTA) 30 MG extended release capsule Take 1 capsule by mouth in the morning. 30 capsule 0    buPROPion (WELLBUTRIN XL) 150 MG extended release tablet Take 1 tablet by mouth every morning 90 tablet 1    levonorgestrel (MIRENA, 52 MG,) IUD 52 mg 1 each by IntraUTERine route once       No facility-administered encounter medications on file as of 8/2/2022. Allergies   Allergen Reactions    Amoxicillin Hives    Sulfa Antibiotics Hives         Family History   Problem Relation Age of Onset    Diabetes Maternal Grandfather     Thyroid Disease Mother     Thyroid Disease Paternal Grandmother     Hypertension Maternal Grandfather     Diabetes Maternal Grandmother     Colon Cancer Maternal Uncle 48    Hypertension Maternal Uncle     Breast Cancer Maternal Aunt 60    Hypertension Maternal Grandmother     No Known Problems Father     Hypertension Maternal Aunt          Social History     Socioeconomic History    Marital status:     Number of children: 2   Tobacco Use    Smoking status: Never    Smokeless tobacco: Never   Vaping Use    Vaping Use: Never used   Substance and Sexual Activity    Alcohol use: No    Drug use: No    Sexual activity: Yes     Partners: Male     Birth control/protection: I.U.D. Comment: mirena placed 5/13/22   Social History Narrative    Denies physical or sexual abuse           ROS:  Review of Systems   Constitutional: Negative for chills, fever and weight loss. HENT: Negative for hearing loss. Eyes: Negative for blurred vision and double vision. Respiratory: Negative for cough, hemoptysis and shortness of breath.    Cardiovascular: Negative for chest pain, palpitations and

## 2022-08-08 ENCOUNTER — PATIENT MESSAGE (OUTPATIENT)
Dept: BEHAVIORAL/MENTAL HEALTH CLINIC | Age: 32
End: 2022-08-08

## 2022-08-09 ENCOUNTER — TELEPHONE (OUTPATIENT)
Dept: BEHAVIORAL/MENTAL HEALTH CLINIC | Age: 32
End: 2022-08-09

## 2022-08-09 NOTE — TELEPHONE ENCOUNTER
I received the following message \"As per our discussion during my appointment I am following up on how Cymbalta is going. .. most of the initial side effects have stopped now that I am about 2 weeks in. I have a lot more energy and feel more present. Only complaint is I have had a steep increase in jaw clenching/tension (constantly having to think about not doing it). I have been wearing a hard guard at night to not do it in my sleep, but day time is a bit frustrating. Will this side effect subside? I had similar issue with Zoloft but that also caused migraines so this is less intense for the time being. In the past I discussed some options with a TMJ specialist that included botox to relax muscle but if this will subside I would prefer to avoid this route. However if this is a common side effect in this time of medication the benefit of the medicine seems to outweigh the resistance to alternative treatment of the side effect. \"

## 2022-08-09 NOTE — TELEPHONE ENCOUNTER
From: Araceli Angel  To: Dr. Santiago Situ: 8/8/2022 8:44 PM EDT  Subject: Cymbalta Follow Up    As per our discussion during my appointment I am following up on how Cymbalta is going. .. most of the initial side effects have stopped now that I am about 2 weeks in. I have a lot more energy and feel more present. Only complaint is I have had a steep increase in jaw clenching/tension (constantly having to think about not doing it). I have been wearing a hard guard at night to not do it in my sleep, but day time is a bit frustrating. Will this side effect subside? I had similar issue with Zoloft but that also caused migraines so this is less intense for the time being. In the past I discussed some options with a TMJ specialist that included botox to relax muscle but if this will subside I would prefer to avoid this route. However if this is a common side effect in this time of medication the benefit of the medicine seems to outweigh the resistance to alternative treatment of the side effect.

## 2022-08-16 ENCOUNTER — TELEMEDICINE (OUTPATIENT)
Dept: BEHAVIORAL/MENTAL HEALTH CLINIC | Age: 32
End: 2022-08-16
Payer: COMMERCIAL

## 2022-08-16 ENCOUNTER — TELEPHONE (OUTPATIENT)
Dept: BEHAVIORAL/MENTAL HEALTH CLINIC | Age: 32
End: 2022-08-16

## 2022-08-16 DIAGNOSIS — F50.81 BINGE EATING DISORDER: ICD-10-CM

## 2022-08-16 DIAGNOSIS — F41.1 GENERALIZED ANXIETY DISORDER: ICD-10-CM

## 2022-08-16 DIAGNOSIS — F33.41 RECURRENT MAJOR DEPRESSIVE DISORDER, IN PARTIAL REMISSION (HCC): ICD-10-CM

## 2022-08-16 DIAGNOSIS — T88.7XXA MEDICATION SIDE EFFECTS: Primary | ICD-10-CM

## 2022-08-16 PROCEDURE — 99214 OFFICE O/P EST MOD 30 MIN: CPT | Performed by: PSYCHIATRY & NEUROLOGY

## 2022-08-16 PROCEDURE — G8427 DOCREV CUR MEDS BY ELIG CLIN: HCPCS | Performed by: PSYCHIATRY & NEUROLOGY

## 2022-08-16 RX ORDER — DULOXETIN HYDROCHLORIDE 30 MG/1
30 CAPSULE, DELAYED RELEASE ORAL DAILY
Qty: 90 CAPSULE | Refills: 1 | Status: SHIPPED | OUTPATIENT
Start: 2022-08-16 | End: 2022-10-06 | Stop reason: SDUPTHER

## 2022-08-16 ASSESSMENT — ANXIETY QUESTIONNAIRES
IF YOU CHECKED OFF ANY PROBLEMS ON THIS QUESTIONNAIRE, HOW DIFFICULT HAVE THESE PROBLEMS MADE IT FOR YOU TO DO YOUR WORK, TAKE CARE OF THINGS AT HOME, OR GET ALONG WITH OTHER PEOPLE: VERY DIFFICULT
6. BECOMING EASILY ANNOYED OR IRRITABLE: 0
5. BEING SO RESTLESS THAT IT IS HARD TO SIT STILL: 3
1. FEELING NERVOUS, ANXIOUS, OR ON EDGE: 1
3. WORRYING TOO MUCH ABOUT DIFFERENT THINGS: 0
4. TROUBLE RELAXING: 3
7. FEELING AFRAID AS IF SOMETHING AWFUL MIGHT HAPPEN: 0
GAD7 TOTAL SCORE: 7
2. NOT BEING ABLE TO STOP OR CONTROL WORRYING: 0

## 2022-08-16 ASSESSMENT — PATIENT HEALTH QUESTIONNAIRE - PHQ9
3. TROUBLE FALLING OR STAYING ASLEEP: 0
4. FEELING TIRED OR HAVING LITTLE ENERGY: 0
SUM OF ALL RESPONSES TO PHQ QUESTIONS 1-9: 7
8. MOVING OR SPEAKING SO SLOWLY THAT OTHER PEOPLE COULD HAVE NOTICED. OR THE OPPOSITE, BEING SO FIGETY OR RESTLESS THAT YOU HAVE BEEN MOVING AROUND A LOT MORE THAN USUAL: 3
10. IF YOU CHECKED OFF ANY PROBLEMS, HOW DIFFICULT HAVE THESE PROBLEMS MADE IT FOR YOU TO DO YOUR WORK, TAKE CARE OF THINGS AT HOME, OR GET ALONG WITH OTHER PEOPLE: 2
2. FEELING DOWN, DEPRESSED OR HOPELESS: 0
7. TROUBLE CONCENTRATING ON THINGS, SUCH AS READING THE NEWSPAPER OR WATCHING TELEVISION: 3
SUM OF ALL RESPONSES TO PHQ QUESTIONS 1-9: 7
6. FEELING BAD ABOUT YOURSELF - OR THAT YOU ARE A FAILURE OR HAVE LET YOURSELF OR YOUR FAMILY DOWN: 0
SUM OF ALL RESPONSES TO PHQ QUESTIONS 1-9: 7
5. POOR APPETITE OR OVEREATING: 1
SUM OF ALL RESPONSES TO PHQ9 QUESTIONS 1 & 2: 0
9. THOUGHTS THAT YOU WOULD BE BETTER OFF DEAD, OR OF HURTING YOURSELF: 0
SUM OF ALL RESPONSES TO PHQ QUESTIONS 1-9: 7
1. LITTLE INTEREST OR PLEASURE IN DOING THINGS: 0

## 2022-08-16 NOTE — PROGRESS NOTES
Patient:  Kiko Beard  Age:  34 y.o.  :  1990     SEX:  female MRN:  [id number]     RACE: White (non-)     SEEN:  [x]  PATIENT  []  SPOUSE []  OTHER:              PHQ-9  2022   Little interest or pleasure in doing things 0 0 0   Little interest or pleasure in doing things - - -   Feeling down, depressed, or hopeless 0 1 2   Trouble falling or staying asleep, or sleeping too much 0 3 2   Feeling tired or having little energy 0 3 3   Poor appetite or overeating 1 3 3   Feeling bad about yourself - or that you are a failure or have let yourself or your family down 0 2 2   Trouble concentrating on things, such as reading the newspaper or watching television 3 1 0   Moving or speaking so slowly that other people could have noticed. Or the opposite - being so fidgety or restless that you have been moving around a lot more than usual 3 1 0   Thoughts that you would be better off dead, or of hurting yourself in some way 0 0 0   PHQ-2 Score 0 1 2   Total Score PHQ 2 - - -   PHQ-9 Total Score 7 14 12   If you checked off any problems, how difficult have these problems made it for you to do your work, take care of things at home, or get along with other people? 2 3 1       ERNA-7 SCREENING 2022   Feeling nervous, anxious, or on edge Several days Several days   Not being able to stop or control worrying Not at all Several days   Worrying too much about different things Not at all Several days   Trouble relaxing Nearly every day Nearly every day   Being so restless that it is hard to sit still Nearly every day Several days   Becoming easily annoyed or irritable Not at all Nearly every day   Feeling afraid as if something awful might happen Not at all Not at all   ERNA-7 Total Score 7 10   How difficult have these problems made it for you to do your work, take care of things at home, or get along with other people?  Very difficult Very difficult I was in the office while conducting this encounter. Consent:  She and/or her healthcare decision maker is aware that this patient-initiated Telehealth encounter is a billable service, with coverage as determined by her insurance carrier. She is aware that she may receive a bill and has provided verbal consent to proceed: YesPatient identification was verified, and a caregiver was present when appropriate. The patient was located in a state where the provider was credentialed to provide care. This virtual visit was conducted via [employer]. Pursuant to the emergency declaration under the [facility], [facility] waiver authority and the [employer] Act, this Virtual  Visit was conducted to reduce the patient's risk of exposure to COVID-19 and provide continuity of care for an established patient. Services were provided through a video synchronous discussion virtually to substitute for in-person clinic visit. Due to this being a TeleHealth evaluation, many elements of the physical examination are unable to be assessed. Total Time: minutes: 11-20 minutes. Chief complaint:  Pt says she has been feeling really good on Cymbalta except she is fidgety and clenches her jaw. Subjective:  Seen virtually for follow-up. States Cymbalta has given her energy and she feels really good on it. She does feel fidgety with jaw clenching. Has to use mouthguard at night. Just constantly feels tight in the muscles. Her kids are sick. Note the younger 1 is teething and has get infection. So she is working both in person for 3 days and in office.  works from home 100% and takes care of the kids when she is at work. Supportive psychotherapy provided. We will send the genetic testing kit. I asked her to continue Cymbalta at 30 mg daily.   Advised to discontinue the Wellbutrin and will reassess after couple of weeks to see if the jaw clenching is better. Supportive psychotherapy provided. She denies suicidal ideation/homicidal ideations. Denies symptoms psychosis. Patient Active Problem List   Diagnosis    IBS (irritable bowel syndrome)     LMP, Birth control IUD   Not pregnant    Denies palpitation,SOB, Chest pain, headaches. In no acute distress. MEDICATION REVIEW:    Current Medications:    Current Outpatient Medications   Medication Sig    Cetirizine HCl (ZYRTEC PO) Take by mouth    DULoxetine (CYMBALTA) 30 MG extended release capsule Take 1 capsule by mouth in the morning. dicyclomine (BENTYL) 10 MG capsule TAKE 1 CAPSULE BY MOUTH FOUR TIMES A DAY AS NEEDED    buPROPion (WELLBUTRIN XL) 150 MG extended release tablet Take 1 tablet by mouth every morning    levonorgestrel (MIRENA, 52 MG,) IUD 52 mg 1 each by IntraUTERine route once    lisdexamfetamine (VYVANSE) 30 MG capsule Take 1 capsule by mouth every morning for 30 days. (Patient not taking: Reported on 8/16/2022)     No current facility-administered medications for this visit. Allergies   Allergen Reactions    Amoxicillin Hives    Sulfa Antibiotics Hives       Past Medical History, Past Surgical History, Family history, Social History, and Medications were all reviewed with the patient today and updated as necessary.      Compliant with medication: Yes   Side effects from medications:  Yes jaw clenching     EXAMINATION  Musculoskeletal    GAIT AND STATION   [x] WNL   [] RESTRICTED   [] UNSTEADY WALK        [] ABNORMAL   [] UNBALANCED         PSYCHIATRIC     GENERAL APPEARANCE:   [x]  WELL GROOMED []     DISHEVELED   []  UNKEMPT      []  UNUSUAL/BIZZARE    [] WNL       ATTITUDE:   [x] COOPERATIVE   [] GUARDED   [] SUSPICIOUS      [] HOSTILE                            BEHAVIOR:   [x] CALM   [] HYPERACTIVE   [] MANNERISMS      [] BIZZARE         SPEECH:   [x] NORMAL FOR CLIENT   [] SPONTANEOUS   [] SLURRED   [] WHISPERING      [] LOUD   [] PRESSURED   [] ARTICULATE EYE CONTACT:   [x] WNL   [] BLANK STARE   [] INTENSE      [] AVOIDANT         MOOD:   [] EUTHYMIC   [x] ANXIOUS   [] DEPRESSED      [] IRRITABLE   [] ANGRY   [] APATHETIC     AFFECT:   [x] CONGRUENT WITH MOOD   [] FLAT   [] CONSTRICTED      [] INAPPROPRIATE   [] LABILE           THOUGHT PROCESS:   [x] LOGICAL/GOAL-DIRECTED   [] FOI   [] CIRCUMSANTIAL      [] INCOHERENT   [] TANGENTIAL   [] CONCRETE      [] PERSEVERATION           THOUGHT CONTENT:                DELUSIONS  [x] DENIES  [] GRANDIOSE  [] PERSECUTORY  [] Faith  [] REFERENCE   HALLUCINATIONS  [x] DENIES  [] AUDITORY  [] VISUAL  [] OLFACTORY  [] TACTILE     [] GUSTATORY  [] SOMATIC         OBSESSIONS  [x] DENIES  [] PRESENT         SUICIDAL IDEATION  [x] DENIES  [] PRESENT W/O PLAN  [] PRESENT W/ PLAN       HOMICIDAL IDEATION  [x] DENIES  [] PRESENT W/O PLAN  [] PRESENT W/ PLAN           JUDGEMENT:   [x] GOOD   [] FAIR   [] POOR   INSIGHT:   [x] GOOD   [] FAIR   [] POOR     COGNITION:           SENSORIUM:   [x] ALERT   [] CLOUDED   [] DROWSY     ORIENTATION:   [x] INTACT   [] TIME:  PLACE  PERSON   RECENT & REMOTE MEMORY:   [x] NORMAL   [] OTHER:                  ATTENTION:   [x] INTACT   [] MILD IMPAIRMENT   [] SEVERE IMPAIRMENT     CONCENTRATION:   [x] INTACT   [] MILD IMPAIRMENT   [] SEVERE IMPAIRMENT     LANGUAGE:   [x] AVERAGE   [] ABOVE AVERAGE   [] BELOW AVERAGE     FUND OF KNOWLEDGE:   [] UNABLE TO ASSESS AT THIS TIME   [x] AVERAGE   [] ABOVE AVERAGE   [] BELOW AVERAGE      [] GOOD TO EXCELLENT KNOWLEDGE OF CURRENT EVENTS   [] POOR TO NO KNLEDGE OF CURRENT EVENTS           ABNORMAL MOVEMENTS:   [x] NONE   [] TICS   [] TREMORS   [] BIZZARE      [] FACE   [] TRUNK   [] EXTREMETIES   [] GESTURES        SLEEP:   [x] GOOD   [] FAIR   [] POOR      MUSCLE STRENGTH AND TONE   [x] WNL   [] ATROPHY   [] SPASTIC        [] FLACCID   [] COGWHEEL         Diagnoses/Impressions:    ICD-10-CM    1. Medication side effects  T88. 7XXA       2.  Recurrent major depressive disorder, in partial remission ([facility] [address]  F33.41       3. Generalized anxiety disorder  F41.1       4. Binge eating disorder  F50.81           TREATMENT GOALS:  Symptom reduction, Medication adherence, maintain therapeutic gains    LABS/IMAGING:    []  Ordered [x]  Reviewed [x]  New Labs Ordered: gene sight testing     LAB  WBC   Date/Time Value Ref Range Status   05/08/2022 07:40 AM 7.0 4.3 - 11.1 K/uL Final     Hemoglobin   Date/Time Value Ref Range Status   05/08/2022 07:40 AM 14.8 11.7 - 15.4 g/dL Final     Hematocrit   Date/Time Value Ref Range Status   05/08/2022 07:40 AM 43.3 35.8 - 46.3 % Final     Platelets   Date/Time Value Ref Range Status   05/08/2022 07:40  150 - 450 K/uL Final     Sodium   Date/Time Value Ref Range Status   05/08/2022 07:40  136 - 145 mmol/L Final     Potassium   Date/Time Value Ref Range Status   05/08/2022 07:40 AM 3.9 3.5 - 5.1 mmol/L Final     Chloride   Date/Time Value Ref Range Status   05/08/2022 07:40  (H) 98 - 107 mmol/L Final     CO2   Date/Time Value Ref Range Status   05/08/2022 07:40 AM 25 21 - 32 mmol/L Final     BUN   Date/Time Value Ref Range Status   05/08/2022 07:40 AM 13 6 - 23 MG/DL Final     Magnesium   Date/Time Value Ref Range Status   07/12/2021 03:58 PM 2.2 1.8 - 2.4 mg/dL Final     ALT   Date/Time Value Ref Range Status   05/08/2022 07:40 AM 36 12 - 65 U/L Final     AST   Date/Time Value Ref Range Status   05/08/2022 07:40 AM 22 15 - 37 U/L Final       Please refer to the lab tab in the epic and care everywhere for the most recent lab results. Plan:     [x]  Medication ordered: Discontinue Wellbutrin, continue Cymbalta 30 mg daily. [x]  Medication education/counseling provided  Medication dosage and time to take, purpose/expected benefits/risks, common side effects, lab monitoring required and reason, expected length of treatment, risk of no treatment, effects on pregnancy/nursing, financial availability.  Educated patient on side effects/risks/benefits of meds including  cardiac arrhythmias, suicidal ideations,, orthostatic hypotension, serotonin syndrome, risk of andrea/hypomania from antidepressants, withdrawals from abrupt discontinuation of meds, liver toxicity, risk of bleeding, risk of seizures,  high blood pressure, dizziness, drowsiness, sedation , risk of falls, Risk & benefits discussed: including but not limited to possible off-label medication usage. [x] Follow MSE for sxs improvement     I have reviewed the patients controlled substance prescription history, as maintained in the [place] prescription monitoring program, so that the prescription(s) for a  controlled substance can be given. Recommendations and Referrals: Follow up with : MD, requires monitoring of response to medication, requires monitoring of medication side effects. Time until next PMA:     Follow up with Mental Health Clinicians: psychotherapy interventions, improve level of functioning, monitoring to prevent decompensation /hospitalization, monitoring to maintain therapeutic gains, symptoms (resolving and controlled)           Psychotherapy note:                                __10_ Minutes of psychotherapy     [x]  Supportive psychotherapy, Patient discussed certain situational and personal stressors ongoing in her life at this time, weight management d/w the patient. Sleep hygiene d/w patient. Patient allowed to vent out her emotions. Scenarios were reviewed using role playing and CBT techniques in order to increase insight and decrease anxiety. []  Disposition planning      []  Dangerous and will not contract for safety in the community    **Pateint has been notified: They are to call 911 or go to their nearest E.R. if they are experiencing a medical emergency or suicidal ideations/homicidal ideations. **  All ancillary documentation entered reviewed by provider.       PLEASE NOTE:  This document has been produced in part or whole using voice recognition software. Proofread however unrecognized errors in transcription may be present. Juan Antonio Scott MD            Has not yet started FAB BAG due to the problems she was having with cymbalta.

## 2022-08-23 ENCOUNTER — TELEPHONE (OUTPATIENT)
Dept: BEHAVIORAL/MENTAL HEALTH CLINIC | Age: 32
End: 2022-08-23

## 2022-08-25 RX ORDER — LEVOMEFOLATE CALCIUM 15 MG
15 TABLET ORAL DAILY
Qty: 30 TABLET | Refills: 3 | Status: SHIPPED | OUTPATIENT
Start: 2022-08-25 | End: 2022-10-06 | Stop reason: SDUPTHER

## 2022-08-30 ENCOUNTER — TELEMEDICINE (OUTPATIENT)
Dept: BEHAVIORAL/MENTAL HEALTH CLINIC | Age: 32
End: 2022-08-30
Payer: COMMERCIAL

## 2022-08-30 DIAGNOSIS — F41.1 GENERALIZED ANXIETY DISORDER: ICD-10-CM

## 2022-08-30 DIAGNOSIS — T88.7XXA MEDICATION SIDE EFFECTS: ICD-10-CM

## 2022-08-30 DIAGNOSIS — F50.81 BINGE EATING DISORDER: ICD-10-CM

## 2022-08-30 DIAGNOSIS — F33.41 RECURRENT MAJOR DEPRESSIVE DISORDER, IN PARTIAL REMISSION (HCC): Primary | ICD-10-CM

## 2022-08-30 PROCEDURE — G8427 DOCREV CUR MEDS BY ELIG CLIN: HCPCS | Performed by: PSYCHIATRY & NEUROLOGY

## 2022-08-30 PROCEDURE — 99214 OFFICE O/P EST MOD 30 MIN: CPT | Performed by: PSYCHIATRY & NEUROLOGY

## 2022-08-30 RX ORDER — AZITHROMYCIN MONOHYDRATE 10 MG/ML
SOLUTION/ DROPS OPHTHALMIC
COMMUNITY
Start: 2022-08-27 | End: 2022-10-28 | Stop reason: ALTCHOICE

## 2022-08-30 ASSESSMENT — PATIENT HEALTH QUESTIONNAIRE - PHQ9
2. FEELING DOWN, DEPRESSED OR HOPELESS: 0
SUM OF ALL RESPONSES TO PHQ QUESTIONS 1-9: 8
6. FEELING BAD ABOUT YOURSELF - OR THAT YOU ARE A FAILURE OR HAVE LET YOURSELF OR YOUR FAMILY DOWN: 0
SUM OF ALL RESPONSES TO PHQ QUESTIONS 1-9: 8
10. IF YOU CHECKED OFF ANY PROBLEMS, HOW DIFFICULT HAVE THESE PROBLEMS MADE IT FOR YOU TO DO YOUR WORK, TAKE CARE OF THINGS AT HOME, OR GET ALONG WITH OTHER PEOPLE: 1
9. THOUGHTS THAT YOU WOULD BE BETTER OFF DEAD, OR OF HURTING YOURSELF: 0
3. TROUBLE FALLING OR STAYING ASLEEP: 0
SUM OF ALL RESPONSES TO PHQ9 QUESTIONS 1 & 2: 0
8. MOVING OR SPEAKING SO SLOWLY THAT OTHER PEOPLE COULD HAVE NOTICED. OR THE OPPOSITE, BEING SO FIGETY OR RESTLESS THAT YOU HAVE BEEN MOVING AROUND A LOT MORE THAN USUAL: 1
5. POOR APPETITE OR OVEREATING: 3
1. LITTLE INTEREST OR PLEASURE IN DOING THINGS: 0
SUM OF ALL RESPONSES TO PHQ QUESTIONS 1-9: 8
SUM OF ALL RESPONSES TO PHQ QUESTIONS 1-9: 8
4. FEELING TIRED OR HAVING LITTLE ENERGY: 3
7. TROUBLE CONCENTRATING ON THINGS, SUCH AS READING THE NEWSPAPER OR WATCHING TELEVISION: 1

## 2022-08-30 ASSESSMENT — ANXIETY QUESTIONNAIRES
1. FEELING NERVOUS, ANXIOUS, OR ON EDGE: 1
4. TROUBLE RELAXING: 3
2. NOT BEING ABLE TO STOP OR CONTROL WORRYING: 0
IF YOU CHECKED OFF ANY PROBLEMS ON THIS QUESTIONNAIRE, HOW DIFFICULT HAVE THESE PROBLEMS MADE IT FOR YOU TO DO YOUR WORK, TAKE CARE OF THINGS AT HOME, OR GET ALONG WITH OTHER PEOPLE: SOMEWHAT DIFFICULT
GAD7 TOTAL SCORE: 5
7. FEELING AFRAID AS IF SOMETHING AWFUL MIGHT HAPPEN: 0
5. BEING SO RESTLESS THAT IT IS HARD TO SIT STILL: 0
6. BECOMING EASILY ANNOYED OR IRRITABLE: 1
3. WORRYING TOO MUCH ABOUT DIFFERENT THINGS: 0

## 2022-08-30 NOTE — PROGRESS NOTES
Patient:  Sun Glover  Age:  34 y.o.  :  1990     SEX:  female MRN:  [id number]     RACE: White (non-)     SEEN:  [x]  PATIENT  []  SPOUSE []  OTHER:              PHQ-9  2022   Little interest or pleasure in doing things 0 0 0   Little interest or pleasure in doing things - - -   Feeling down, depressed, or hopeless 0 0 1   Trouble falling or staying asleep, or sleeping too much 0 0 3   Feeling tired or having little energy 3 0 3   Poor appetite or overeating 3 1 3   Feeling bad about yourself - or that you are a failure or have let yourself or your family down 0 0 2   Trouble concentrating on things, such as reading the newspaper or watching television 1 3 1   Moving or speaking so slowly that other people could have noticed. Or the opposite - being so fidgety or restless that you have been moving around a lot more than usual 1 3 1   Thoughts that you would be better off dead, or of hurting yourself in some way 0 0 0   PHQ-2 Score 0 0 1   Total Score PHQ 2 - - -   PHQ-9 Total Score 8 7 14   If you checked off any problems, how difficult have these problems made it for you to do your work, take care of things at home, or get along with other people?  1 2 3       ERNA-7 SCREENING 2022   Feeling nervous, anxious, or on edge Several days Several days Several days   Not being able to stop or control worrying Not at all Not at all Several days   Worrying too much about different things Not at all Not at all Several days   Trouble relaxing Nearly every day Nearly every day Nearly every day   Being so restless that it is hard to sit still Not at all Nearly every day Several days   Becoming easily annoyed or irritable Several days Not at all Nearly every day   Feeling afraid as if something awful might happen Not at all Not at all Not at all   ERNA-7 Total Score 5 7 10   How difficult have these problems made it for you to do your work, take care of things at home, or get along with other people? Somewhat difficult Very difficult Very difficult       I was in the office while conducting this encounter. Consent:  She and/or her healthcare decision maker is aware that this patient-initiated Telehealth encounter is a billable service, with coverage as determined by her insurance carrier. She is aware that she may receive a bill and has provided verbal consent to proceed: YesPatient identification was verified, and a caregiver was present when appropriate. The patient was located in a state where the provider was credentialed to provide care. This virtual visit was conducted via [employer]. Pursuant to the emergency declaration under the [facility], [facility] waiver authority and the [employer] Act, this Virtual  Visit was conducted to reduce the patient's risk of exposure to COVID-19 and provide continuity of care for an established patient. Services were provided through a video synchronous discussion virtually to substitute for in-person clinic visit. Due to this being a TeleHealth evaluation, many elements of the physical examination are unable to be assessed. Total Time: minutes: 11-20 minutes. Chief complaint:  Pt says she has been doing good with her jaw clenching except low energy and tired. Subjective:  Seen virtually for follow-up. States doing good with her jaw clenching. It is still there but not as noticeable and not as bad. She has noted a decrease in energy and she gets tired and more distracted. Hard to concentrate. I will try her on Vyvanse and continue the Cymbalta at the current dose. Supportive psychotherapy provided. She denies suicidal ideation/homicidal ideations. Denies symptoms psychosis. States her children are doing well and eager to 1 has started crawling.     Patient Active Problem List   Diagnosis    IBS (irritable AFFECT:   [x] CONGRUENT WITH MOOD   [] FLAT   [] CONSTRICTED      [] INAPPROPRIATE   [] LABILE           THOUGHT PROCESS:   [x] LOGICAL/GOAL-DIRECTED   [] FOI   [] CIRCUMSANTIAL      [] INCOHERENT   [] TANGENTIAL   [] CONCRETE      [] PERSEVERATION           THOUGHT CONTENT:                DELUSIONS  [x] DENIES  [] GRANDIOSE  [] PERSECUTORY  [] Rastafari  [] REFERENCE   HALLUCINATIONS  [x] DENIES  [] AUDITORY  [] VISUAL  [] OLFACTORY  [] TACTILE     [] GUSTATORY  [] SOMATIC         OBSESSIONS  [x] DENIES  [] PRESENT         SUICIDAL IDEATION  [x] DENIES  [] PRESENT W/O PLAN  [] PRESENT W/ PLAN       HOMICIDAL IDEATION  [x] DENIES  [] PRESENT W/O PLAN  [] PRESENT W/ PLAN           JUDGEMENT:   [x] GOOD   [] FAIR   [] POOR   INSIGHT:   [x] GOOD   [] FAIR   [] POOR     COGNITION:           SENSORIUM:   [x] ALERT   [] CLOUDED   [] DROWSY     ORIENTATION:   [x] INTACT   [] TIME:  PLACE  PERSON   RECENT & REMOTE MEMORY:   [x] NORMAL   [] OTHER:                  ATTENTION:   [] INTACT   [x] MILD IMPAIRMENT   [] SEVERE IMPAIRMENT     CONCENTRATION:   [] INTACT   [x] MILD IMPAIRMENT   [] SEVERE IMPAIRMENT     LANGUAGE:   [x] AVERAGE   [] ABOVE AVERAGE   [] BELOW AVERAGE     FUND OF KNOWLEDGE:   [] UNABLE TO ASSESS AT THIS TIME   [x] AVERAGE   [] ABOVE AVERAGE   [] BELOW AVERAGE      [] GOOD TO EXCELLENT KNOWLEDGE OF CURRENT EVENTS   [] POOR TO NO KNLEDGE OF CURRENT EVENTS           ABNORMAL MOVEMENTS:   [x] NONE   [] TICS   [] TREMORS   [] BIZZARE      [] FACE   [] TRUNK   [] EXTREMETIES   [] GESTURES        SLEEP:   [x] GOOD   [] FAIR   [] POOR      MUSCLE STRENGTH AND TONE   [x] WNL   [] ATROPHY   [] SPASTIC        [] FLACCID   [] COGWHEEL         Diagnoses/Impressions:    ICD-10-CM    1. Recurrent major depressive disorder, in partial remission ([facility]  F33.41       2. Generalized anxiety disorder  F41.1       3. Binge eating disorder  F50.81       4. Medication side effects  T88. 7XXA           TREATMENT GOALS:  Symptom reduction, Medication adherence, maintain therapeutic gains    LABS/IMAGING:    []  Ordered [x]  Reviewed []  New Labs Ordered:     LAB  WBC   Date/Time Value Ref Range Status   05/08/2022 07:40 AM 7.0 4.3 - 11.1 K/uL Final     Hemoglobin   Date/Time Value Ref Range Status   05/08/2022 07:40 AM 14.8 11.7 - 15.4 g/dL Final     Hematocrit   Date/Time Value Ref Range Status   05/08/2022 07:40 AM 43.3 35.8 - 46.3 % Final     Platelets   Date/Time Value Ref Range Status   05/08/2022 07:40  150 - 450 K/uL Final     Sodium   Date/Time Value Ref Range Status   05/08/2022 07:40  136 - 145 mmol/L Final     Potassium   Date/Time Value Ref Range Status   05/08/2022 07:40 AM 3.9 3.5 - 5.1 mmol/L Final     Chloride   Date/Time Value Ref Range Status   05/08/2022 07:40  (H) 98 - 107 mmol/L Final     CO2   Date/Time Value Ref Range Status   05/08/2022 07:40 AM 25 21 - 32 mmol/L Final     BUN   Date/Time Value Ref Range Status   05/08/2022 07:40 AM 13 6 - 23 MG/DL Final     Magnesium   Date/Time Value Ref Range Status   07/12/2021 03:58 PM 2.2 1.8 - 2.4 mg/dL Final     ALT   Date/Time Value Ref Range Status   05/08/2022 07:40 AM 36 12 - 65 U/L Final     AST   Date/Time Value Ref Range Status   05/08/2022 07:40 AM 22 15 - 37 U/L Final       Please refer to the lab tab in the epic and care everywhere for the most recent lab results. Plan:     [x]  Medication ordered: Vyvanse and Cymbalta to target depression, anxiety, insomnia, attention concentration, binge eating disorder    [x]  Medication education/counseling provided  Medication dosage and time to take, purpose/expected benefits/risks, common side effects, lab monitoring required and reason, expected length of treatment, risk of no treatment, effects on pregnancy/nursing, financial availability.  Educated patient on  side effects/risks/benefits of meds including cardiac arrhythmias, suicidal ideations,  orthostatic hypotension, serotonin syndrome, risk of andrea/hypomania from antidepressants, withdrawals from abrupt discontinuation of meds, liver toxicity, risk of bleeding, risk of seizures,  high blood pressure, dizziness, drowsiness, sedation , risk of falls, Risk & benefits discussed: including but not limited to possible off-label medication usage. [x] Follow MSE for sxs improvement     I have reviewed the patients controlled substance prescription history, as maintained in the [place] prescription monitoring program, so that the prescription(s) for a  controlled substance can be given. Recommendations and Referrals: Follow up with : MD, requires monitoring of response to medication, requires monitoring of medication side effects. Time until next PMA:     Follow up with Mental Health Clinicians: psychotherapy interventions, improve level of functioning, monitoring to prevent decompensation /hospitalization, monitoring to maintain therapeutic gains, symptoms (resolving and controlled)           Psychotherapy note:                                __10_ Minutes of psychotherapy     [x]  Supportive psychotherapy, Patient discussed certain situational and personal stressors ongoing in her life at this time, weight management d/w the patient. Sleep hygiene d/w patient. Patient allowed to vent out her emotions. Scenarios were reviewed using role playing and CBT techniques in order to increase insight and decrease anxiety. []  Disposition planning      []  Dangerous and will not contract for safety in the community    **Pateint has been notified: They are to call 911 or go to their nearest E.R. if they are experiencing a medical emergency or suicidal ideations/homicidal ideations. **  All ancillary documentation entered reviewed by provider. PLEASE NOTE:  This document has been produced in part or whole using voice recognition software. Proofread however unrecognized errors in transcription may be present.         Gordon Moran MD

## 2022-09-19 ENCOUNTER — TELEPHONE (OUTPATIENT)
Dept: BEHAVIORAL/MENTAL HEALTH CLINIC | Age: 32
End: 2022-09-19

## 2022-09-22 DIAGNOSIS — F50.81 BINGE EATING DISORDER: ICD-10-CM

## 2022-10-06 ENCOUNTER — OFFICE VISIT (OUTPATIENT)
Dept: BEHAVIORAL/MENTAL HEALTH CLINIC | Age: 32
End: 2022-10-06
Payer: COMMERCIAL

## 2022-10-06 VITALS
HEART RATE: 90 BPM | SYSTOLIC BLOOD PRESSURE: 112 MMHG | OXYGEN SATURATION: 98 % | TEMPERATURE: 98.4 F | DIASTOLIC BLOOD PRESSURE: 76 MMHG

## 2022-10-06 DIAGNOSIS — F33.41 RECURRENT MAJOR DEPRESSIVE DISORDER, IN PARTIAL REMISSION (HCC): Primary | ICD-10-CM

## 2022-10-06 DIAGNOSIS — F50.81 BINGE EATING DISORDER: ICD-10-CM

## 2022-10-06 DIAGNOSIS — F41.1 GENERALIZED ANXIETY DISORDER: ICD-10-CM

## 2022-10-06 DIAGNOSIS — F51.02 ADJUSTMENT INSOMNIA: ICD-10-CM

## 2022-10-06 PROCEDURE — 99214 OFFICE O/P EST MOD 30 MIN: CPT | Performed by: PSYCHIATRY & NEUROLOGY

## 2022-10-06 PROCEDURE — 1036F TOBACCO NON-USER: CPT | Performed by: PSYCHIATRY & NEUROLOGY

## 2022-10-06 PROCEDURE — G8484 FLU IMMUNIZE NO ADMIN: HCPCS | Performed by: PSYCHIATRY & NEUROLOGY

## 2022-10-06 PROCEDURE — G8417 CALC BMI ABV UP PARAM F/U: HCPCS | Performed by: PSYCHIATRY & NEUROLOGY

## 2022-10-06 PROCEDURE — G8427 DOCREV CUR MEDS BY ELIG CLIN: HCPCS | Performed by: PSYCHIATRY & NEUROLOGY

## 2022-10-06 RX ORDER — LEVOMEFOLATE CALCIUM 15 MG
15 TABLET ORAL DAILY
Qty: 30 TABLET | Refills: 3 | Status: SHIPPED | OUTPATIENT
Start: 2022-10-06

## 2022-10-06 RX ORDER — DULOXETIN HYDROCHLORIDE 30 MG/1
30 CAPSULE, DELAYED RELEASE ORAL DAILY
Qty: 90 CAPSULE | Refills: 1 | Status: SHIPPED | OUTPATIENT
Start: 2022-10-06

## 2022-10-06 RX ORDER — IBUPROFEN 600 MG/1
600 TABLET ORAL
COMMUNITY
Start: 2022-01-13

## 2022-10-06 ASSESSMENT — ANXIETY QUESTIONNAIRES
GAD7 TOTAL SCORE: 3
6. BECOMING EASILY ANNOYED OR IRRITABLE: 1
7. FEELING AFRAID AS IF SOMETHING AWFUL MIGHT HAPPEN: 0
IF YOU CHECKED OFF ANY PROBLEMS ON THIS QUESTIONNAIRE, HOW DIFFICULT HAVE THESE PROBLEMS MADE IT FOR YOU TO DO YOUR WORK, TAKE CARE OF THINGS AT HOME, OR GET ALONG WITH OTHER PEOPLE: SOMEWHAT DIFFICULT
3. WORRYING TOO MUCH ABOUT DIFFERENT THINGS: 0
1. FEELING NERVOUS, ANXIOUS, OR ON EDGE: 0
2. NOT BEING ABLE TO STOP OR CONTROL WORRYING: 0
5. BEING SO RESTLESS THAT IT IS HARD TO SIT STILL: 1
4. TROUBLE RELAXING: 1

## 2022-10-06 ASSESSMENT — PATIENT HEALTH QUESTIONNAIRE - PHQ9
SUM OF ALL RESPONSES TO PHQ9 QUESTIONS 1 & 2: 0
8. MOVING OR SPEAKING SO SLOWLY THAT OTHER PEOPLE COULD HAVE NOTICED. OR THE OPPOSITE, BEING SO FIGETY OR RESTLESS THAT YOU HAVE BEEN MOVING AROUND A LOT MORE THAN USUAL: 0
2. FEELING DOWN, DEPRESSED OR HOPELESS: 0
SUM OF ALL RESPONSES TO PHQ QUESTIONS 1-9: 2
3. TROUBLE FALLING OR STAYING ASLEEP: 1
7. TROUBLE CONCENTRATING ON THINGS, SUCH AS READING THE NEWSPAPER OR WATCHING TELEVISION: 0
5. POOR APPETITE OR OVEREATING: 1
SUM OF ALL RESPONSES TO PHQ QUESTIONS 1-9: 2
6. FEELING BAD ABOUT YOURSELF - OR THAT YOU ARE A FAILURE OR HAVE LET YOURSELF OR YOUR FAMILY DOWN: 0
4. FEELING TIRED OR HAVING LITTLE ENERGY: 0
1. LITTLE INTEREST OR PLEASURE IN DOING THINGS: 0
10. IF YOU CHECKED OFF ANY PROBLEMS, HOW DIFFICULT HAVE THESE PROBLEMS MADE IT FOR YOU TO DO YOUR WORK, TAKE CARE OF THINGS AT HOME, OR GET ALONG WITH OTHER PEOPLE: 0
9. THOUGHTS THAT YOU WOULD BE BETTER OFF DEAD, OR OF HURTING YOURSELF: 0

## 2022-10-06 NOTE — PROGRESS NOTES
Patient:  Chelsea Rowley  Age:  34 y.o.  :  1990     SEX:  female MRN:  [id number]     RACE: White (non-)     SEEN:  [x]  PATIENT  []  SPOUSE []  OTHER:              PHQ-9  10/6/2022 2022 2022   Little interest or pleasure in doing things 0 0 0   Little interest or pleasure in doing things - - -   Feeling down, depressed, or hopeless 0 0 0   Trouble falling or staying asleep, or sleeping too much 1 0 0   Feeling tired or having little energy 0 3 0   Poor appetite or overeating 1 3 1   Feeling bad about yourself - or that you are a failure or have let yourself or your family down 0 0 0   Trouble concentrating on things, such as reading the newspaper or watching television 0 1 3   Moving or speaking so slowly that other people could have noticed. Or the opposite - being so fidgety or restless that you have been moving around a lot more than usual 0 1 3   Thoughts that you would be better off dead, or of hurting yourself in some way 0 0 0   PHQ-2 Score 0 0 0   Total Score PHQ 2 - - -   PHQ-9 Total Score 2 8 7   If you checked off any problems, how difficult have these problems made it for you to do your work, take care of things at home, or get along with other people?  0 1 2       ERNA-7 SCREENING 10/6/2022 2022 2022   Feeling nervous, anxious, or on edge Not at all Several days Several days   Not being able to stop or control worrying Not at all Not at all Not at all   Worrying too much about different things Not at all Not at all Not at all   Trouble relaxing Several days Nearly every day Nearly every day   Being so restless that it is hard to sit still Several days Not at all Nearly every day   Becoming easily annoyed or irritable Several days Several days Not at all   Feeling afraid as if something awful might happen Not at all Not at all Not at all   ERNA-7 Total Score 3 5 7   How difficult have these problems made it for you to do your work, take care of things at home, or get along with other people? Somewhat difficult Somewhat difficult Very difficult          Chief complaint:  Pt says she has been doing well on the current medications. Subjective:  Seen for a follow-up. States has been doing well on the current medications. They had a large round of lay off yesterday and she could commonly wait for the meeting. 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 She noted that she did not get very anxious. Kids doing good. They were sick after the vacation at [facility]. States her and her  have been communicating better. They just celebrated their eighth anniversary. He has not been drinking. She and her  started exercising again. Some days she has been more irritable. She needs to destress and bedtime. Has noted that she has been bruising a lot easier lately. Advised to consult with her PCP. She also has some dry mouth. Supportive psychotherapy provided. She denies suicidal ideation/homicidal ideations. Denies symptoms of psychosis. Patient Active Problem List   Diagnosis    IBS (irritable bowel syndrome)     LMP, Birth control, Mirena IUD  Not pregnant    Denies palpitation,SOB, Chest pain, headaches. In no acute distress. MEDICATION REVIEW:    Current Medications:    Current Outpatient Medications   Medication Sig    ibuprofen (ADVIL;MOTRIN) 600 MG tablet Take 600 mg by mouth    Loratadine (CLARITIN PO) Take by mouth    L-Methylfolate 15 MG TABS Take 15 mg by mouth daily    DULoxetine (CYMBALTA) 30 MG extended release capsule Take 1 capsule by mouth daily    [START ON 12/21/2022] lisdexamfetamine (VYVANSE) 50 MG capsule Take 1 capsule by mouth every morning for 30 days.     Cetirizine HCl (ZYRTEC PO) Take by mouth    dicyclomine (BENTYL) 10 MG capsule TAKE 1 CAPSULE BY MOUTH FOUR TIMES A DAY AS NEEDED    levonorgestrel (MIRENA, 52 MG,) IUD 52 mg 1 each by IntraUTERine route once    AZASITE 1 % ophthalmic solution PLEASE SEE ATTACHED FOR DETAILED DIRECTIONS (Patient not taking: Reported on 10/6/2022)     No current facility-administered medications for this visit. Allergies   Allergen Reactions    Amoxicillin Hives    Sulfa Antibiotics Hives       Past Medical History, Past Surgical History, Family history, Social History, and Medications were all reviewed with the patient today and updated as necessary.      Compliant with medication: Yes   Side effects from medications:  Yes dry mouth     EXAMINATION  Musculoskeletal    GAIT AND STATION   [x] WNL   [] RESTRICTED   [] UNSTEADY WALK        [] ABNORMAL   [] UNBALANCED         PSYCHIATRIC     GENERAL APPEARANCE:   [x]  WELL GROOMED []     DISHEVELED   []  UNKEMPT      []  UNUSUAL/BIZZARE    [] WNL       ATTITUDE:   [x] COOPERATIVE   [] GUARDED   [] SUSPICIOUS      [] HOSTILE                            BEHAVIOR:   [x] CALM   [] HYPERACTIVE   [] MANNERISMS      [] BIZZARE         SPEECH:   [x] NORMAL FOR CLIENT   [] SPONTANEOUS   [] SLURRED   [] WHISPERING      [] LOUD   [] PRESSURED   [] ARTICULATE       EYE CONTACT:   [x] WNL   [] BLANK STARE   [] INTENSE      [] AVOIDANT         MOOD:   [x] EUTHYMIC   [] ANXIOUS   [] DEPRESSED      [] IRRITABLE   [] ANGRY   [] APATHETIC     AFFECT:   [x] CONGRUENT WITH MOOD   [] FLAT   [] CONSTRICTED      [] INAPPROPRIATE   [] LABILE           THOUGHT PROCESS:   [x] LOGICAL/GOAL-DIRECTED   [] FOI   [] CIRCUMSANTIAL      [] INCOHERENT   [] TANGENTIAL   [] CONCRETE      [] PERSEVERATION           THOUGHT CONTENT:                DELUSIONS  [x] DENIES  [] GRANDIOSE  [] PERSECUTORY  [] Christian  [] REFERENCE   HALLUCINATIONS  [x] DENIES  [] AUDITORY  [] VISUAL  [] OLFACTORY  [] TACTILE     [] GUSTATORY  [] SOMATIC         OBSESSIONS  [x] DENIES  [] PRESENT         SUICIDAL IDEATION  [x] DENIES  [] PRESENT W/O PLAN  [] PRESENT W/ PLAN       HOMICIDAL IDEATION  [x] DENIES  [] PRESENT W/O PLAN  [] PRESENT W/ PLAN           JUDGEMENT:   [x] GOOD   [] FAIR   [] POOR   INSIGHT:   [x] GOOD   [] FAIR   [] POOR     COGNITION:           SENSORIUM:   [x] ALERT   [] CLOUDED   [] DROWSY     ORIENTATION:   [x] INTACT   [] TIME:  PLACE  PERSON   RECENT & REMOTE MEMORY:   [x] NORMAL   [] OTHER:                  ATTENTION:   [x] INTACT   [] MILD IMPAIRMENT   [] SEVERE IMPAIRMENT     CONCENTRATION:   [x] INTACT   [] MILD IMPAIRMENT   [] SEVERE IMPAIRMENT     LANGUAGE:   [x] AVERAGE   [] ABOVE AVERAGE   [] BELOW AVERAGE     FUND OF KNOWLEDGE:   [] UNABLE TO ASSESS AT THIS TIME   [x] AVERAGE   [] ABOVE AVERAGE   [] BELOW AVERAGE      [] GOOD TO EXCELLENT KNOWLEDGE OF CURRENT EVENTS   [] POOR TO NO KNLEDGE OF CURRENT EVENTS           ABNORMAL MOVEMENTS:   [x] NONE   [] TICS   [] TREMORS   [] BIZZARE      [] FACE   [] TRUNK   [] EXTREMETIES   [] GESTURES        SLEEP:   [] GOOD   [x] FAIR   [] POOR      MUSCLE STRENGTH AND TONE   [x] WNL   [] ATROPHY   [] SPASTIC        [] FLACCID   [] COGWHEEL         Diagnoses/Impressions:    ICD-10-CM    1. Recurrent major depressive disorder, [facility]  F33.41       2. Binge eating disorder  F50.81 lisdexamfetamine (VYVANSE) 50 MG capsule     DISCONTINUED: lisdexamfetamine (VYVANSE) 50 MG capsule     DISCONTINUED: lisdexamfetamine (VYVANSE) 50 MG capsule     DISCONTINUED: lisdexamfetamine (VYVANSE) 50 MG capsule      3. Generalized anxiety disorder  F41.1       4.  Adjustment insomnia  F51.02           TREATMENT GOALS:  Symptom reduction, Medication adherence, maintain therapeutic gains    LABS/IMAGING:    []  Ordered [x]  Reviewed []  New Labs Ordered:     LAB  WBC   Date/Time Value Ref Range Status   05/08/2022 07:40 AM 7.0 4.3 - 11.1 K/uL Final     Hemoglobin   Date/Time Value Ref Range Status   05/08/2022 07:40 AM 14.8 11.7 - 15.4 g/dL Final     Hematocrit   Date/Time Value Ref Range Status   05/08/2022 07:40 AM 43.3 35.8 - 46.3 % Final     Platelets   Date/Time Value Ref Range Status   05/08/2022 07:40  150 - 450 K/uL Final     Sodium   Date/Time Value Ref Range Status   05/08/2022 07:40  136 - 145 mmol/L Final     Potassium   Date/Time Value Ref Range Status   05/08/2022 07:40 AM 3.9 3.5 - 5.1 mmol/L Final     Chloride   Date/Time Value Ref Range Status   05/08/2022 07:40  (H) 98 - 107 mmol/L Final     CO2   Date/Time Value Ref Range Status   05/08/2022 07:40 AM 25 21 - 32 mmol/L Final     BUN   Date/Time Value Ref Range Status   05/08/2022 07:40 AM 13 6 - 23 MG/DL Final     Magnesium   Date/Time Value Ref Range Status   07/12/2021 03:58 PM 2.2 1.8 - 2.4 mg/dL Final     ALT   Date/Time Value Ref Range Status   05/08/2022 07:40 AM 36 12 - 65 U/L Final     AST   Date/Time Value Ref Range Status   05/08/2022 07:40 AM 22 15 - 37 U/L Final       Please refer to the lab tab in the epic and care everywhere for the most recent lab results. Plan:     [x]  Medication ordered: Cymbalta, L-methylfolate, Vyvanse to target depression, anxiety, binge eating, attention and concentration. [x]  Medication education/counseling provided  Medication dosage and time to take, purpose/expected benefits/risks, common side effects, lab monitoring required and reason, expected length of treatment, risk of no treatment, effects on pregnancy/nursing, financial availability. Educated patient on  side effects/risks/benefits of meds including   cardiac arrhythmias, suicidal ideations,orthostatic hypotension, serotonin syndrome, risk of andrea/hypomania from antidepressants, withdrawals from abrupt discontinuation of meds, liver toxicity, risk of bleeding, risk of seizures, addiction potential, high blood pressure, dizziness, drowsiness, sedation , risk of falls, Risk & benefits discussed: including but not limited to possible off-label medication usage.        [x] Follow MSE for sxs improvement     I have reviewed the patients controlled substance prescription history, as maintained in the [place] prescription monitoring program, so that the prescription(s) for a  controlled substance can be given. Recommendations and Referrals: Follow up with : MD, requires monitoring of response to medication, requires monitoring of medication side effects. Time until next PMA:     Follow up with Mental Health Clinicians: psychotherapy interventions, improve level of functioning, monitoring to prevent decompensation /hospitalization, monitoring to maintain therapeutic gains, symptoms (resolving and controlled)           Psychotherapy note:                                __10_ Minutes of psychotherapy     [x]  Supportive psychotherapy, Patient discussed certain situational and personal stressors ongoing in her life at this time, weight management d/w the patient. Sleep hygiene d/w patient. Patient allowed to vent out her emotions. Scenarios were reviewed using role playing and CBT techniques in order to increase insight and decrease anxiety. []  Disposition planning      []  Dangerous and will not contract for safety in the community    **Pateint has been notified: They are to call 911 or go to their nearest E.R. if they are experiencing a medical emergency or suicidal ideations/homicidal ideations. **  All ancillary documentation entered reviewed by provider. PLEASE NOTE:  This document has been produced in part or whole using voice recognition software. Proofread however unrecognized errors in transcription may be present. MD Melissa Bell has to be 90 day and sent to [facility].

## 2022-10-28 ENCOUNTER — TELEMEDICINE (OUTPATIENT)
Dept: FAMILY MEDICINE CLINIC | Facility: CLINIC | Age: 32
End: 2022-10-28
Payer: COMMERCIAL

## 2022-10-28 DIAGNOSIS — J40 BRONCHITIS: Primary | ICD-10-CM

## 2022-10-28 PROCEDURE — 99214 OFFICE O/P EST MOD 30 MIN: CPT | Performed by: FAMILY MEDICINE

## 2022-10-28 RX ORDER — PREDNISONE 20 MG/1
40 TABLET ORAL DAILY
Qty: 20 TABLET | Refills: 0 | Status: SHIPPED | OUTPATIENT
Start: 2022-10-28 | End: 2022-11-07

## 2022-10-28 RX ORDER — ALBUTEROL SULFATE 90 UG/1
2 AEROSOL, METERED RESPIRATORY (INHALATION) 4 TIMES DAILY PRN
Qty: 54 G | Refills: 1 | Status: SHIPPED | OUTPATIENT
Start: 2022-10-28

## 2022-10-28 RX ORDER — DOXYCYCLINE HYCLATE 100 MG
100 TABLET ORAL 2 TIMES DAILY
Qty: 10 TABLET | Refills: 0 | Status: SHIPPED | OUTPATIENT
Start: 2022-10-28 | End: 2022-11-02

## 2022-10-28 ASSESSMENT — ENCOUNTER SYMPTOMS
COUGH: 1
WHEEZING: 0
SHORTNESS OF BREATH: 0
DIARRHEA: 0
RHINORRHEA: 1
NAUSEA: 0
SINUS PRESSURE: 1
ABDOMINAL PAIN: 0
VOMITING: 0
SORE THROAT: 1

## 2022-10-28 NOTE — PROGRESS NOTES
Sarah Gastelum (:  1990) is a Established patient, here for evaluation of the following:    Assessment & Plan   Below is the assessment and plan developed based on review of pertinent history, physical exam, labs, studies, and medications. 1. Bronchitis  -     albuterol sulfate HFA (VENTOLIN HFA) 108 (90 Base) MCG/ACT inhaler; Inhale 2 puffs into the lungs 4 times daily as needed for Wheezing, Disp-54 g, R-1Normal  -     predniSONE (DELTASONE) 20 MG tablet; Take 2 tablets by mouth daily for 10 days, Disp-20 tablet, R-0Normal  -     doxycycline hyclate (VIBRA-TABS) 100 MG tablet; Take 1 tablet by mouth 2 times daily for 5 days, Disp-10 tablet, R-0Normal  Sent doxy, but told her to hold unless not improving, or worsneing by . No follow-ups on file. Subjective   HPI  Chief Complaint   Patient presents with    URI   Her children have been ill over the past few weeks with congestion and had some fevers. Patient's throat started hurting on  and has persisted. Feels like she has a lot of drainage. Loosing her voice  yesterday. Kids tested negative for strep. Also neg for rsv, flu, covid. Coughing up some yellow/brown phlegm. Taking mucinex but not getting much relief. Review of Systems   Constitutional:  Positive for chills, diaphoresis, fatigue and fever. HENT:  Positive for congestion, postnasal drip, rhinorrhea, sinus pressure, sneezing and sore throat. Negative for ear pain. Respiratory:  Positive for cough. Negative for shortness of breath and wheezing. Cardiovascular:  Negative for chest pain and palpitations. Gastrointestinal:  Negative for abdominal pain, diarrhea, nausea and vomiting. Musculoskeletal:  Positive for myalgias. Skin:  Negative for rash. Neurological:  Positive for headaches. Hematological:  Negative for adenopathy.         Objective     Physical Exam  [INSTRUCTIONS:  \"[x]\" Indicates a positive item  \"[]\" Indicates a negative item  -- DELETE ALL ITEMS NOT EXAMINED]    Constitutional: [x] Appears well-developed and well-nourished [x] No apparent distress      [] Abnormal -     Mental status: [x] Alert and awake  [x] Oriented to person/place/time [x] Able to follow commands    [] Abnormal -     Eyes:   EOM    [x]  Normal    [] Abnormal -   Sclera  [x]  Normal    [] Abnormal -          Discharge [x]  None visible   [] Abnormal -     HENT: [x] Normocephalic, atraumatic  [] Abnormal -   [x] Mouth/Throat: Mucous membranes are moist    External Ears [x] Normal  [] Abnormal -    Neck: [x] No visualized mass [] Abnormal -     Pulmonary/Chest: [x] Respiratory effort normal   [x] No visualized signs of difficulty breathing or respiratory distress        [] Abnormal -      Musculoskeletal:   [x] Normal gait with no signs of ataxia         [x] Normal range of motion of neck        [] Abnormal -     Neurological:        [x] No Facial Asymmetry (Cranial nerve 7 motor function) (limited exam due to video visit)          [x] No gaze palsy        [] Abnormal -          Skin:        [x] No significant exanthematous lesions or discoloration noted on facial skin         [] Abnormal -            Psychiatric:       [x] Normal Affect [] Abnormal -        [x] No Hallucinations    Other pertinent observable physical exam findings:-       Patient-Reported Vitals 10/28/2022   Patient-Reported Weight 184.5   Patient-Reported Height 5' 6\"   Patient-Reported Pulse 80      Patient-Reported Vitals  No data recorded         Sarah Soodgreg Meza, was evaluated through a synchronous (real-time) audio-video encounter. The patient (or guardian if applicable) is aware that this is a billable service, which includes applicable co-pays. This Virtual Visit was conducted with patient's (and/or legal guardian's) consent.  The visit was conducted pursuant to the emergency declaration under the [facility] [place] 2[place] waiver authority and the Coronavirus Preparedness and Response Supplemental Appropriations Act. Patient identification was verified, and a caregiver was present when appropriate. The patient was located at Home: [address] [place] [address] [address] [id number].    [facility] Spike Clement MD

## 2022-12-19 ENCOUNTER — TELEMEDICINE (OUTPATIENT)
Dept: FAMILY MEDICINE CLINIC | Facility: CLINIC | Age: 32
End: 2022-12-19
Payer: COMMERCIAL

## 2022-12-19 DIAGNOSIS — S05.02XA ABRASION OF LEFT CORNEA, INITIAL ENCOUNTER: Primary | ICD-10-CM

## 2022-12-19 PROCEDURE — 99442 PR PHYS/QHP TELEPHONE EVALUATION 11-20 MIN: CPT | Performed by: FAMILY MEDICINE

## 2022-12-19 ASSESSMENT — PATIENT HEALTH QUESTIONNAIRE - PHQ9
SUM OF ALL RESPONSES TO PHQ9 QUESTIONS 1 & 2: 0
SUM OF ALL RESPONSES TO PHQ QUESTIONS 1-9: 0
2. FEELING DOWN, DEPRESSED OR HOPELESS: 0
SUM OF ALL RESPONSES TO PHQ QUESTIONS 1-9: 0
1. LITTLE INTEREST OR PLEASURE IN DOING THINGS: 0

## 2022-12-19 NOTE — PROGRESS NOTES
Chelsea Rowley is a 34 y.o. female, evaluated via audio-only technology on 12/19/2022 for Conjunctivitis  . Assessment & Plan:      Diagnosis Orders   1. Abrasion of left cornea, initial encounter          [facility] self limiting within 24 hrs. Eye drops. If acute change in vision or having severe eye pain, call her eye Dr  Subjective:   Left eye redness and irritation. Feels gritty, like something is in her eye. No discharge. Doesn't wear contacts. No eye pain or change in vision. She does have an eye dr    Prior to Admission medications    Medication Sig Start Date End Date Taking? Authorizing Provider   Fexofenadine HCl (ALLEGRA PO) Take by mouth daily   Yes Historical Provider, MD   albuterol sulfate HFA (VENTOLIN HFA) 108 (90 Base) MCG/ACT inhaler Inhale 2 puffs into the lungs 4 times daily as needed for Wheezing 10/28/22  Yes Greg Grimaldo MD   ibuprofen (ADVIL;MOTRIN) 600 MG tablet Take 600 mg by mouth 1/13/22  Yes Historical Provider, MD   L-Methylfolate 15 MG TABS Take 15 mg by mouth daily 10/6/22  Yes Christine Crowell MD   DULoxetine (CYMBALTA) 30 MG extended release capsule Take 1 capsule by mouth daily 10/6/22  Yes Christine Crowell MD   lisdexamfetamine (VYVANSE) 50 MG capsule Take 1 capsule by mouth every morning for 30 days. 12/21/22 1/20/23 Yes Christine Crowell MD   dicyclomine (BENTYL) 10 MG capsule TAKE 1 CAPSULE BY MOUTH FOUR TIMES A DAY AS NEEDED 7/10/22  Yes Historical Provider, MD   levonorgestrel (MIRENA, 52 MG,) IUD 52 mg 1 each by IntraUTERine route once   Yes Historical Provider, MD           No flowsheet data found. Chelsea Rowley was evaluated through a phone encounter. The patient (or guardian if applicable) is aware that this is a billable service, which includes applicable co-pays. This Virtual Visit was conducted with patient's (and/or leg guardian's) consent.  The visit was conducted pursuant to the emergency declaration under the [address] Emergencies Act, TI85 waiver authority and the Coronavirus Preparedness and Response Supplemental Appropriations Act. Patient identification was verified, and a caregiver was present when appropriate. The patient was located in a state where the provider was licensed to provide care. I was at home while conducting this encounter. Pt was in the car. She provided verbal consent to proceed: Shannon Chowdhury She has not had a related appointment within my department in the past 7 days or scheduled within the next 24 hours.          May Choctaw, DO

## 2023-01-04 ENCOUNTER — PATIENT MESSAGE (OUTPATIENT)
Dept: BEHAVIORAL/MENTAL HEALTH CLINIC | Age: 33
End: 2023-01-04

## 2023-01-05 NOTE — TELEPHONE ENCOUNTER
From: Nuria Hwang  To: Dr. Luke Councilman: 1/4/2023 9:27 PM EST  Subject: Refill request    Happy New Year!! Submitting refill request for Vyvanse and L-methylfolate please

## 2023-01-10 ENCOUNTER — TELEMEDICINE (OUTPATIENT)
Dept: BEHAVIORAL/MENTAL HEALTH CLINIC | Age: 33
End: 2023-01-10

## 2023-01-10 DIAGNOSIS — F50.81 BINGE EATING DISORDER: ICD-10-CM

## 2023-01-10 DIAGNOSIS — F41.1 GENERALIZED ANXIETY DISORDER: ICD-10-CM

## 2023-01-10 DIAGNOSIS — F33.42 RECURRENT MAJOR DEPRESSIVE DISORDER, IN FULL REMISSION (HCC): Primary | ICD-10-CM

## 2023-01-10 DIAGNOSIS — F51.02 ADJUSTMENT INSOMNIA: ICD-10-CM

## 2023-01-10 RX ORDER — LEVOMEFOLATE CALCIUM 15 MG
15 TABLET ORAL DAILY
Qty: 30 TABLET | Refills: 3 | Status: SHIPPED | OUTPATIENT
Start: 2023-01-10

## 2023-01-10 RX ORDER — DULOXETIN HYDROCHLORIDE 30 MG/1
30 CAPSULE, DELAYED RELEASE ORAL DAILY
Qty: 90 CAPSULE | Refills: 1 | Status: SHIPPED | OUTPATIENT
Start: 2023-01-10

## 2023-01-10 ASSESSMENT — ANXIETY QUESTIONNAIRES
GAD7 TOTAL SCORE: 3
1. FEELING NERVOUS, ANXIOUS, OR ON EDGE: 0
5. BEING SO RESTLESS THAT IT IS HARD TO SIT STILL: 1
2. NOT BEING ABLE TO STOP OR CONTROL WORRYING: 0
3. WORRYING TOO MUCH ABOUT DIFFERENT THINGS: 0
6. BECOMING EASILY ANNOYED OR IRRITABLE: 1
4. TROUBLE RELAXING: 1
IF YOU CHECKED OFF ANY PROBLEMS ON THIS QUESTIONNAIRE, HOW DIFFICULT HAVE THESE PROBLEMS MADE IT FOR YOU TO DO YOUR WORK, TAKE CARE OF THINGS AT HOME, OR GET ALONG WITH OTHER PEOPLE: SOMEWHAT DIFFICULT
7. FEELING AFRAID AS IF SOMETHING AWFUL MIGHT HAPPEN: 0

## 2023-01-10 ASSESSMENT — PATIENT HEALTH QUESTIONNAIRE - PHQ9
4. FEELING TIRED OR HAVING LITTLE ENERGY: 0
7. TROUBLE CONCENTRATING ON THINGS, SUCH AS READING THE NEWSPAPER OR WATCHING TELEVISION: 0
5. POOR APPETITE OR OVEREATING: 0
SUM OF ALL RESPONSES TO PHQ QUESTIONS 1-9: 1
2. FEELING DOWN, DEPRESSED OR HOPELESS: 0
SUM OF ALL RESPONSES TO PHQ QUESTIONS 1-9: 1
SUM OF ALL RESPONSES TO PHQ QUESTIONS 1-9: 1
3. TROUBLE FALLING OR STAYING ASLEEP: 1
SUM OF ALL RESPONSES TO PHQ9 QUESTIONS 1 & 2: 0
10. IF YOU CHECKED OFF ANY PROBLEMS, HOW DIFFICULT HAVE THESE PROBLEMS MADE IT FOR YOU TO DO YOUR WORK, TAKE CARE OF THINGS AT HOME, OR GET ALONG WITH OTHER PEOPLE: 1
6. FEELING BAD ABOUT YOURSELF - OR THAT YOU ARE A FAILURE OR HAVE LET YOURSELF OR YOUR FAMILY DOWN: 0
8. MOVING OR SPEAKING SO SLOWLY THAT OTHER PEOPLE COULD HAVE NOTICED. OR THE OPPOSITE, BEING SO FIGETY OR RESTLESS THAT YOU HAVE BEEN MOVING AROUND A LOT MORE THAN USUAL: 0
9. THOUGHTS THAT YOU WOULD BE BETTER OFF DEAD, OR OF HURTING YOURSELF: 0
SUM OF ALL RESPONSES TO PHQ QUESTIONS 1-9: 1
1. LITTLE INTEREST OR PLEASURE IN DOING THINGS: 0

## 2023-01-10 NOTE — PROGRESS NOTES
Patient:  Kimberley Victoria  Age:  34 y.o.  :  1990     SEX:  female MRN:  [id number]     RACE: White (non-)     SEEN:  [x]  PATIENT  []  SPOUSE []  OTHER:              PHQ-9  1/10/2023 2022 10/6/2022   Little interest or pleasure in doing things 0 0 0   Little interest or pleasure in doing things - - -   Feeling down, depressed, or hopeless 0 0 0   Trouble falling or staying asleep, or sleeping too much 1 - 1   Feeling tired or having little energy 0 - 0   Poor appetite or overeating 0 - 1   Feeling bad about yourself - or that you are a failure or have let yourself or your family down 0 - 0   Trouble concentrating on things, such as reading the newspaper or watching television 0 - 0   Moving or speaking so slowly that other people could have noticed. Or the opposite - being so fidgety or restless that you have been moving around a lot more than usual 0 - 0   Thoughts that you would be better off dead, or of hurting yourself in some way 0 - 0   PHQ-2 Score 0 0 0   Total Score PHQ 2 - - -   PHQ-9 Total Score 1 0 2   If you checked off any problems, how difficult have these problems made it for you to do your work, take care of things at home, or get along with other people?  1 - 0       ERNA-7 SCREENING 1/10/2023 10/6/2022 2022   Feeling nervous, anxious, or on edge Not at all Not at all Several days   Not being able to stop or control worrying Not at all Not at all Not at all   Worrying too much about different things Not at all Not at all Not at all   Trouble relaxing Several days Several days Nearly every day   Being so restless that it is hard to sit still Several days Several days Not at all   Becoming easily annoyed or irritable Several days Several days Several days   Feeling afraid as if something awful might happen Not at all Not at all Not at all   ERNA-7 Total Score 3 3 5   How difficult have these problems made it for you to do your work, take care of things at home, or get along with other people? Somewhat difficult Somewhat difficult Somewhat difficult        I was in the office while conducting this encounter. Consent:  She and/or her healthcare decision maker is aware that this patient-initiated Telehealth encounter is a billable service, with coverage as determined by her insurance carrier. She is aware that she may receive a bill and has provided verbal consent to proceed: YesPatient identification was verified, and a caregiver was present when appropriate. The patient was located in a state where the provider was credentialed to provide care. This virtual visit was conducted via [address] Pursuant to the emergency declaration under the [facility], [facility] waiver authority and the [employer] Act, this Virtual  Visit was conducted to reduce the patient's risk of exposure to COVID-19 and provide continuity of care for an established patient. Services were provided through a video synchronous discussion virtually to substitute for in-person clinic visit. Due to this being a TeleHealth evaluation, many elements of the physical examination are unable to be assessed. Total Time: minutes: 11-20 minutes. Chief complaint:  Pt says she has been irritable for a few weeks. Subjective:  Seen virtually for follow-up. States has been irritable for a few weeks. Getting back into routine of the holidays. Work has been quiet. Everyone is healthy at home. Before Christmas she had an IBS flareup and was passing blood for 48 hours. They have scheduled colonoscopy. Because of that she has not been taking her meds consistently. Gets flustered easily and is back to being more on edge. She has been communicating with her  better and he has been helping her remember to take the medicines. Her mother has been helping at home as well.    has been helping with preparing meals at home because kids have been sick too. Baby turns 1 tomorrow and oldest is back in school. December they had COVID with mild symptoms. She has started kickboxing for stress management. Supportive psychotherapy provided. Patient denies suicidal ideation/homicidal ideations. Denies symptoms of psychosis. Patient Active Problem List   Diagnosis    IBS (irritable bowel syndrome)     LMP 12/5/22, Birth control Mirena IUD  Not pregnant    Denies palpitation,SOB, Chest pain, headaches. In no acute distress. MEDICATION REVIEW:    Current Medications:    Current Outpatient Medications   Medication Sig    L-Methylfolate 15 MG TABS Take 15 mg by mouth daily    DULoxetine (CYMBALTA) 30 MG extended release capsule Take 1 capsule by mouth daily    [START ON 2/9/2023] lisdexamfetamine (VYVANSE) 50 MG capsule Take 1 capsule by mouth every morning for 30 days. Fexofenadine HCl (ALLEGRA PO) Take by mouth daily    albuterol sulfate HFA (VENTOLIN HFA) 108 (90 Base) MCG/ACT inhaler Inhale 2 puffs into the lungs 4 times daily as needed for Wheezing    ibuprofen (ADVIL;MOTRIN) 600 MG tablet Take 600 mg by mouth    dicyclomine (BENTYL) 10 MG capsule TAKE 1 CAPSULE BY MOUTH FOUR TIMES A DAY AS NEEDED    levonorgestrel (MIRENA, 52 MG,) IUD 52 mg 1 each by IntraUTERine route once     No current facility-administered medications for this visit. Allergies   Allergen Reactions    Amoxicillin Hives    Sulfa Antibiotics Hives       Past Medical History, Past Surgical History, Family history, Social History, and Medications were all reviewed with the patient today and updated as necessary.      Compliant with medication: Yes   Side effects from medications:  No     EXAMINATION  Musculoskeletal    GAIT AND STATION   [x] WNL   [] RESTRICTED   [] UNSTEADY WALK        [] ABNORMAL   [] UNBALANCED         PSYCHIATRIC     GENERAL APPEARANCE:   [x]  WELL GROOMED []     DISHEVELED   []  UNKEMPT      []  UNUSUAL/BIZZARE    [] WNL       ATTITUDE:   [x] COOPERATIVE   [] GUARDED   [] SUSPICIOUS      [] HOSTILE                            BEHAVIOR:   [x] CALM   [] HYPERACTIVE   [] MANNERISMS      [] BIZZARE         SPEECH:   [x] NORMAL FOR CLIENT   [] SPONTANEOUS   [] SLURRED   [] WHISPERING      [] LOUD   [] PRESSURED   [] ARTICULATE       EYE CONTACT:   [x] WNL   [] BLANK STARE   [] INTENSE      [] AVOIDANT         MOOD:   [x] EUTHYMIC   [] ANXIOUS   [] DEPRESSED      [] IRRITABLE   [] ANGRY   [] APATHETIC     AFFECT:   [x] CONGRUENT WITH MOOD   [] FLAT   [] CONSTRICTED      [] INAPPROPRIATE   [] LABILE           THOUGHT PROCESS:   [x] LOGICAL/GOAL-DIRECTED   [] FOI   [] CIRCUMSANTIAL      [] INCOHERENT   [] TANGENTIAL   [] CONCRETE      [] PERSEVERATION           THOUGHT CONTENT:                DELUSIONS  [x] DENIES  [] GRANDIOSE  [] PERSECUTORY  [] Hoahaoism  [] REFERENCE   HALLUCINATIONS  [x] DENIES  [] AUDITORY  [] VISUAL  [] OLFACTORY  [] TACTILE     [] GUSTATORY  [] SOMATIC         OBSESSIONS  [x] DENIES  [] PRESENT         SUICIDAL IDEATION  [x] DENIES  [] PRESENT W/O PLAN  [] PRESENT W/ PLAN       HOMICIDAL IDEATION  [x] DENIES  [] PRESENT W/O PLAN  [] PRESENT W/ PLAN           JUDGEMENT:   [x] GOOD   [] FAIR   [] POOR   INSIGHT:   [x] GOOD   [] FAIR   [] POOR     COGNITION:           SENSORIUM:   [x] ALERT   [] CLOUDED   [] DROWSY     ORIENTATION:   [x] INTACT   [] TIME:  PLACE  PERSON   RECENT & REMOTE MEMORY:   [x] NORMAL   [] OTHER:                  ATTENTION:   [x] INTACT   [] MILD IMPAIRMENT   [] SEVERE IMPAIRMENT     CONCENTRATION:   [x] INTACT   [] MILD IMPAIRMENT   [] SEVERE IMPAIRMENT     LANGUAGE:   [x] AVERAGE   [] ABOVE AVERAGE   [] BELOW AVERAGE     FUND OF KNOWLEDGE:   [] UNABLE TO ASSESS AT THIS TIME   [x] AVERAGE   [] ABOVE AVERAGE   [] BELOW AVERAGE      [] GOOD TO EXCELLENT KNOWLEDGE OF CURRENT EVENTS   [] POOR TO NO KNLEDGE OF CURRENT EVENTS           ABNORMAL MOVEMENTS:   [x] NONE   [] TICS   [] TREMORS   [] BIZZARE      [] FACE   [] TRUNK   [] EXTREMETIES   [] GESTURES        SLEEP:   [] GOOD   [x] FAIR   [] POOR      MUSCLE STRENGTH AND TONE   [] WNL   [] ATROPHY   [] SPASTIC        [] FLACCID   [] COGWHEEL         Diagnoses/Impressions:    ICD-10-CM    1. Recurrent major depressive disorder, in full remission ([facility] [address]  F33.42       2. Binge eating disorder  F50.81 lisdexamfetamine (VYVANSE) 50 MG capsule     DISCONTINUED: lisdexamfetamine (VYVANSE) 50 MG capsule     DISCONTINUED: lisdexamfetamine (VYVANSE) 50 MG capsule      3. Generalized anxiety disorder  F41.1       4.  Adjustment insomnia  F51.02           TREATMENT GOALS:  Symptom reduction, Medication adherence, maintain therapeutic gains    LABS/IMAGING:    []  Ordered [x]  Reviewed []  New Labs Ordered:     LAB  WBC   Date/Time Value Ref Range Status   05/08/2022 07:40 AM 7.0 4.3 - 11.1 K/uL Final     Hemoglobin   Date/Time Value Ref Range Status   05/08/2022 07:40 AM 14.8 11.7 - 15.4 g/dL Final     Hematocrit   Date/Time Value Ref Range Status   05/08/2022 07:40 AM 43.3 35.8 - 46.3 % Final     Platelets   Date/Time Value Ref Range Status   05/08/2022 07:40  150 - 450 K/uL Final     Sodium   Date/Time Value Ref Range Status   05/08/2022 07:40  136 - 145 mmol/L Final     Potassium   Date/Time Value Ref Range Status   05/08/2022 07:40 AM 3.9 3.5 - 5.1 mmol/L Final     Chloride   Date/Time Value Ref Range Status   05/08/2022 07:40  (H) 98 - 107 mmol/L Final     CO2   Date/Time Value Ref Range Status   05/08/2022 07:40 AM 25 21 - 32 mmol/L Final     BUN   Date/Time Value Ref Range Status   05/08/2022 07:40 AM 13 6 - 23 MG/DL Final     Magnesium   Date/Time Value Ref Range Status   07/12/2021 03:58 PM 2.2 1.8 - 2.4 mg/dL Final     ALT   Date/Time Value Ref Range Status   05/08/2022 07:40 AM 36 12 - 65 U/L Final     AST   Date/Time Value Ref Range Status   05/08/2022 07:40 AM 22 15 - 37 U/L Final       Please refer to the lab tab in the epic and care everywhere for the most recent lab results. Plan:     [x]  Medication ordered: Cymbalta, Vyvanse, L-methylfolate to target depression, anxiety, insomnia. [x]  Medication education/counseling provided  Medication dosage and time to take, purpose/expected benefits/risks, common side effects, lab monitoring required and reason, expected length of treatment, risk of no treatment, effects on pregnancy/nursing, financial availability. Educated patient on  side effects/risks/benefits of meds including cardiac arrhythmias, suicidal ideations,  orthostatic hypotension, serotonin syndrome, risk of andrea/hypomania from antidepressants, withdrawals from abrupt discontinuation of meds, liver toxicity, risk of bleeding, risk of seizures, addiction potential,  high blood pressure, dizziness, drowsiness, sedation , risk of falls, Ri agitation, psychosis, insomnia, sk & benefits discussed: including but not limited to possible off-label medication usage. [x] Follow MSE for sxs improvement     I have reviewed the patients controlled substance prescription history, as maintained in the [place] prescription monitoring program, so that the prescription(s) for a  controlled substance can be given. Recommendations and Referrals: Follow up with : MD, requires monitoring of response to medication, requires monitoring of medication side effects. Time until next PMA:     Follow up with Mental Health Clinicians: psychotherapy interventions, improve level of functioning, monitoring to prevent decompensation /hospitalization, monitoring to maintain therapeutic gains, symptoms (resolving and controlled)           Psychotherapy note:                                __10_ Minutes of psychotherapy     [x]  Supportive psychotherapy, Patient discussed certain situational and personal stressors ongoing in her life at this time, weight management d/w the patient. Sleep hygiene d/w patient.  Patient allowed to vent out her emotions. Scenarios were reviewed using role playing and CBT techniques in order to increase insight and decrease anxiety. []  Disposition planning      []  Dangerous and will not contract for safety in the community    **Pateint has been notified: They are to call 911 or go to their nearest E.R. if they are experiencing a medical emergency or suicidal ideations/homicidal ideations. **  All ancillary documentation entered reviewed by provider. PLEASE NOTE:  This document has been produced in part or whole using voice recognition software. Proofread however unrecognized errors in transcription may be present. Maria De Jesus Serra MD          Scheduled for colonoscopy on Thursday.

## 2023-02-06 ENCOUNTER — OFFICE VISIT (OUTPATIENT)
Dept: BEHAVIORAL/MENTAL HEALTH CLINIC | Facility: CLINIC | Age: 33
End: 2023-02-06

## 2023-02-06 DIAGNOSIS — F33.41 RECURRENT MAJOR DEPRESSIVE DISORDER, IN PARTIAL REMISSION (HCC): ICD-10-CM

## 2023-02-06 DIAGNOSIS — F50.81 BINGE EATING DISORDER: Primary | ICD-10-CM

## 2023-02-06 DIAGNOSIS — F41.1 GENERALIZED ANXIETY DISORDER: ICD-10-CM
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
## 2023-03-03 ENCOUNTER — TELEPHONE (OUTPATIENT)
Dept: BEHAVIORAL/MENTAL HEALTH CLINIC | Age: 33
End: 2023-03-03

## 2023-03-03 ENCOUNTER — PATIENT MESSAGE (OUTPATIENT)
Dept: BEHAVIORAL/MENTAL HEALTH CLINIC | Age: 33
End: 2023-03-03

## 2023-03-03 NOTE — TELEPHONE ENCOUNTER
From: Kiko Beard  To: Dr. Scarlett Peters: 3/3/2023 8:05 AM EST  Subject: Vyvanse refill request    Requesting refill for Vyvanse please

## 2023-03-03 NOTE — TELEPHONE ENCOUNTER
----- Message from Chantel Alston sent at 3/3/2023  8:05 AM EST -----  Regarding: Vyvanse refill request  Requesting refill for Vyvanse please

## 2023-03-04 DIAGNOSIS — F50.81 BINGE EATING DISORDER: ICD-10-CM

## 2023-03-22 ENCOUNTER — OFFICE VISIT (OUTPATIENT)
Dept: BEHAVIORAL/MENTAL HEALTH CLINIC | Facility: CLINIC | Age: 33
End: 2023-03-22
Payer: COMMERCIAL

## 2023-03-22 DIAGNOSIS — F50.81 BINGE EATING DISORDER: Primary | ICD-10-CM

## 2023-03-22 DIAGNOSIS — F33.41 RECURRENT MAJOR DEPRESSIVE DISORDER, IN PARTIAL REMISSION (HCC): ICD-10-CM

## 2023-03-22 DIAGNOSIS — F41.1 GENERALIZED ANXIETY DISORDER: ICD-10-CM

## 2023-03-22 PROCEDURE — 1036F TOBACCO NON-USER: CPT | Performed by: SOCIAL WORKER

## 2023-03-22 PROCEDURE — 90837 PSYTX W PT 60 MINUTES: CPT | Performed by: SOCIAL WORKER

## 2023-03-23 NOTE — PROGRESS NOTES
INDIVIDUAL THERAPY NOTE  NAME: Cecilia Nixon    DATE: 3/22/23    TYPE OF SERVICE: Individual Therapy/In person visit/[place] in [place]    LOCATION OF SERVICE: [facility]    DURATION:  55 mins    DIAGNOSIS: :    1. Binge eating disorder    2. Generalized anxiety disorder    3. Recurrent major depressive disorder, in partial remission ([facility]        CHIEF COMPLAINT:  depression and anxiety    MENTAL STATUS EXAM:  Pt was appropriately groomed. Pt was 0x4. No unusual mannerisms were noted. No psychotic symptoms displayed by pt. Pt was cooperative and engaged in the session. Insight and judgment were intact. Denied suicidal or homicidal ideation. Fund of knowledge appears to be WNL. Fund of knowledge and attention span are WNL. Denies developmental or language difficulties. 0x4. Mood/Affect: mildly depressed and anxious  Thought Process: coherent    Pt is progressing on goals. PLAN: CBT, DBT, Humanistic/Client Centered, ACT, Seeking Safety, Psychodynamic, ERP may be used to address goals. Summary of Service: This SW met with the pt face to face in the office. The pt reports that she and her mother recently got tattoos together. She enjoyed this time with her mom. Her home life has been stable. She still endorses some low self esteem. The CBT triangle was explained and ways to change negative beliefs was discussed. This SW and the pt began to identify possible negative core beliefs and how these were formed. These beliefs were challenged and this will continue in future sessions. Follow Up: 2 weeks      Will continue to meet with and be available to patient as scheduled and per patient's request/compliance.

## 2023-03-28 ENCOUNTER — TELEMEDICINE (OUTPATIENT)
Dept: FAMILY MEDICINE CLINIC | Facility: CLINIC | Age: 33
End: 2023-03-28
Payer: COMMERCIAL

## 2023-03-28 DIAGNOSIS — B96.89 ACUTE BACTERIAL SINUSITIS: Primary | ICD-10-CM

## 2023-03-28 DIAGNOSIS — J01.90 ACUTE BACTERIAL SINUSITIS: Primary | ICD-10-CM

## 2023-03-28 PROCEDURE — 99213 OFFICE O/P EST LOW 20 MIN: CPT | Performed by: FAMILY MEDICINE

## 2023-03-28 RX ORDER — GUAIFENESIN AND CODEINE PHOSPHATE 100; 10 MG/5ML; MG/5ML
10 SOLUTION ORAL
Qty: 118 ML | Refills: 0 | Status: SHIPPED | OUTPATIENT
Start: 2023-03-28 | End: 2023-04-09

## 2023-03-28 RX ORDER — BENZONATATE 200 MG/1
200 CAPSULE ORAL 3 TIMES DAILY PRN
Qty: 30 CAPSULE | Refills: 0 | Status: SHIPPED | OUTPATIENT
Start: 2023-03-28 | End: 2023-04-04

## 2023-03-28 RX ORDER — DOXYCYCLINE HYCLATE 100 MG
100 TABLET ORAL 2 TIMES DAILY
Qty: 14 TABLET | Refills: 0 | Status: SHIPPED | OUTPATIENT
Start: 2023-03-28 | End: 2023-04-04

## 2023-03-28 SDOH — ECONOMIC STABILITY: INCOME INSECURITY: HOW HARD IS IT FOR YOU TO PAY FOR THE VERY BASICS LIKE FOOD, HOUSING, MEDICAL CARE, AND HEATING?: NOT HARD AT ALL

## 2023-03-28 SDOH — ECONOMIC STABILITY: FOOD INSECURITY: WITHIN THE PAST 12 MONTHS, THE FOOD YOU BOUGHT JUST DIDN'T LAST AND YOU DIDN'T HAVE MONEY TO GET MORE.: NEVER TRUE

## 2023-03-28 SDOH — ECONOMIC STABILITY: HOUSING INSECURITY
IN THE LAST 12 MONTHS, WAS THERE A TIME WHEN YOU DID NOT HAVE A STEADY PLACE TO SLEEP OR SLEPT IN A SHELTER (INCLUDING NOW)?: NO

## 2023-03-28 SDOH — ECONOMIC STABILITY: FOOD INSECURITY: WITHIN THE PAST 12 MONTHS, YOU WORRIED THAT YOUR FOOD WOULD RUN OUT BEFORE YOU GOT MONEY TO BUY MORE.: NEVER TRUE

## 2023-03-28 ASSESSMENT — PATIENT HEALTH QUESTIONNAIRE - PHQ9
SUM OF ALL RESPONSES TO PHQ QUESTIONS 1-9: 0
SUM OF ALL RESPONSES TO PHQ QUESTIONS 1-9: 0
7. TROUBLE CONCENTRATING ON THINGS, SUCH AS READING THE NEWSPAPER OR WATCHING TELEVISION: 0
SUM OF ALL RESPONSES TO PHQ QUESTIONS 1-9: 0
5. POOR APPETITE OR OVEREATING: 0
6. FEELING BAD ABOUT YOURSELF - OR THAT YOU ARE A FAILURE OR HAVE LET YOURSELF OR YOUR FAMILY DOWN: 0
3. TROUBLE FALLING OR STAYING ASLEEP: 0
9. THOUGHTS THAT YOU WOULD BE BETTER OFF DEAD, OR OF HURTING YOURSELF: 0
2. FEELING DOWN, DEPRESSED OR HOPELESS: 0
4. FEELING TIRED OR HAVING LITTLE ENERGY: 0
SUM OF ALL RESPONSES TO PHQ9 QUESTIONS 1 & 2: 0
SUM OF ALL RESPONSES TO PHQ QUESTIONS 1-9: 0
10. IF YOU CHECKED OFF ANY PROBLEMS, HOW DIFFICULT HAVE THESE PROBLEMS MADE IT FOR YOU TO DO YOUR WORK, TAKE CARE OF THINGS AT HOME, OR GET ALONG WITH OTHER PEOPLE: 0
1. LITTLE INTEREST OR PLEASURE IN DOING THINGS: 0
8. MOVING OR SPEAKING SO SLOWLY THAT OTHER PEOPLE COULD HAVE NOTICED. OR THE OPPOSITE, BEING SO FIGETY OR RESTLESS THAT YOU HAVE BEEN MOVING AROUND A LOT MORE THAN USUAL: 0

## 2023-03-28 ASSESSMENT — ENCOUNTER SYMPTOMS
SHORTNESS OF BREATH: 0
COUGH: 1
NAUSEA: 0
ABDOMINAL PAIN: 0
DIARRHEA: 0
RHINORRHEA: 1
SORE THROAT: 1
SINUS PRESSURE: 1
WHEEZING: 0
VOMITING: 0

## 2023-03-28 NOTE — PROGRESS NOTES
No apparent distress      [] Abnormal -     Mental status: [x] Alert and awake  [x] Oriented to person/place/time [x] Able to follow commands    [] Abnormal -     Eyes:   EOM    [x]  Normal    [] Abnormal -   Sclera  [x]  Normal    [] Abnormal -          Discharge [x]  None visible   [] Abnormal -     HENT: [x] Normocephalic, atraumatic  [] Abnormal -   [x] Mouth/Throat: Mucous membranes are moist    External Ears [x] Normal  [] Abnormal -    Neck: [x] No visualized mass [] Abnormal -     Pulmonary/Chest: [x] Respiratory effort normal   [x] No visualized signs of difficulty breathing or respiratory distress        [] Abnormal -      Musculoskeletal:   [x] Normal gait with no signs of ataxia         [x] Normal range of motion of neck        [] Abnormal -     Neurological:        [x] No Facial Asymmetry (Cranial nerve 7 motor function) (limited exam due to video visit)          [x] No gaze palsy        [] Abnormal -          Skin:        [x] No significant exanthematous lesions or discoloration noted on facial skin         [] Abnormal -            Psychiatric:       [x] Normal Affect [] Abnormal -        [x] No Hallucinations    Other pertinent observable physical exam findings:-       Patient-Reported Vitals 3/28/2023   Patient-Reported Weight 187   Patient-Reported Height -   Patient-Reported Pulse -      Patient-Reported Vitals  Patient-Reported Weight: 187       Sarah Ritter Glenn, was evaluated through a synchronous (real-time) audio-video encounter. The patient (or guardian if applicable) is aware that this is a billable service, which includes applicable co-pays. This Virtual Visit was conducted with patient's (and/or legal guardian's) consent. The visit was conducted pursuant to the emergency declaration under the [address] [place] [address], [address] [place] [address] authority and the [employer] General Act.   Patient identification was verified, and a

## 2023-04-17 ENCOUNTER — OFFICE VISIT (OUTPATIENT)
Dept: BEHAVIORAL/MENTAL HEALTH CLINIC | Facility: CLINIC | Age: 33
End: 2023-04-17
Payer: COMMERCIAL

## 2023-04-17 DIAGNOSIS — F41.1 GENERALIZED ANXIETY DISORDER: ICD-10-CM

## 2023-04-17 DIAGNOSIS — F50.81 BINGE EATING DISORDER: Primary | ICD-10-CM

## 2023-04-17 DIAGNOSIS — F33.41 RECURRENT MAJOR DEPRESSIVE DISORDER, IN PARTIAL REMISSION (HCC): ICD-10-CM

## 2023-04-17 PROCEDURE — 1036F TOBACCO NON-USER: CPT | Performed by: SOCIAL WORKER

## 2023-04-17 PROCEDURE — 90837 PSYTX W PT 60 MINUTES: CPT | Performed by: SOCIAL WORKER

## 2023-04-17 NOTE — PROGRESS NOTES
INDIVIDUAL THERAPY NOTE  NAME: Kelsi Hernandez    DATE: 4/17/2023    TYPE OF SERVICE: Individual Therapy    LOCATION OF SERVICE: [facility]    DURATION: 60 mons    DIAGNOSIS: :    1. Binge eating disorder    2. Generalized anxiety disorder    3. Recurrent major depressive disorder, in partial remission ([facility]        CHIEF COMPLAINT: depression, anxiety, BED    MENTAL STATUS EXAM:  Pt was appropriately dressed and groomed. Pt was 0x4. No unusual mannerisms were noted. No psychotic symptoms displayed by pt. Pt was cooperative and engaged in the session. Insight and judgment were intact. Denied suicidal or homicidal ideation. Fund of knowledge and attention span are WNL. Denies developmental or language difficulties. Mood/Affect: anxious, mildly depressed  Thought Process: coherent    PLAN: CBT, DBT, CPT, Humanistic/Client Centered, ACT, Seeking Safety, Psychodynamic, ERP may be used to address goals. Summary of Service: This SW met with the pt face to face in the office. The pt has been very anxious and overwhelmed for the past two weeks. This SW and the pt discussed what was going on in her life. The Struggle Switch was discussed and how to use Socratic questioning to help balance anxious thoughts. The pt had been thinking about the CBT triangle and how core beliefs were formed. The pt talked about HS and college relationships to help identify why she always feels she has to prove her worth. Her , when he was drinking, compounded these thoughts and feelings. Guided questions were asked to help the pt process. In the next session, psycho ed about anxiety will be discussed and how to regulate the nervous system. Rewiring the brain, neuro plasticity, FEED, and the pretzel will be taught.       Follow Up:

## 2023-04-24 ENCOUNTER — TELEPHONE (OUTPATIENT)
Dept: BEHAVIORAL/MENTAL HEALTH CLINIC | Age: 33
End: 2023-04-24

## 2023-04-24 NOTE — TELEPHONE ENCOUNTER
----- Message from Chantel Gastelum sent at 4/23/2023 10:26 AM EDT -----  Regarding: Refill-- Vyvanse  Contact: [phone]  Hello,    Requesting refills be sent to [facility]:    Vyvanse --will be out Wednesday April 26 (sorry for late ask)    Cymbalta -- will be out in 8 days okay to wait on refill to be sent until after appt Tuesday April 25th.     Thanks  Chantel

## 2023-04-24 NOTE — TELEPHONE ENCOUNTER
Vyvanse must be filled locally as it is a controlled substance. This is already at the local pharmacy at [facility] on [address]. Patient will need to contact pharmacy to have this filled. Additional fills will be sent with appt tomorrow. Sent [employer].

## 2023-04-25 ENCOUNTER — OFFICE VISIT (OUTPATIENT)
Dept: BEHAVIORAL/MENTAL HEALTH CLINIC | Age: 33
End: 2023-04-25

## 2023-04-25 VITALS
DIASTOLIC BLOOD PRESSURE: 78 MMHG | HEIGHT: 66 IN | SYSTOLIC BLOOD PRESSURE: 122 MMHG | HEART RATE: 84 BPM | OXYGEN SATURATION: 99 % | WEIGHT: 185 LBS | TEMPERATURE: 98.4 F | BODY MASS INDEX: 29.73 KG/M2

## 2023-04-25 DIAGNOSIS — F33.0 MILD EPISODE OF RECURRENT MAJOR DEPRESSIVE DISORDER (HCC): Primary | ICD-10-CM

## 2023-04-25 DIAGNOSIS — F50.81 BINGE EATING DISORDER: ICD-10-CM

## 2023-04-25 DIAGNOSIS — F41.1 GENERALIZED ANXIETY DISORDER: ICD-10-CM

## 2023-04-25 DIAGNOSIS — F51.02 ADJUSTMENT INSOMNIA: ICD-10-CM

## 2023-04-25 RX ORDER — DULOXETIN HYDROCHLORIDE 60 MG/1
60 CAPSULE, DELAYED RELEASE ORAL DAILY
Qty: 90 CAPSULE | Refills: 1 | Status: SHIPPED | OUTPATIENT
Start: 2023-04-25

## 2023-04-25 RX ORDER — ALPRAZOLAM 0.5 MG/1
0.5 TABLET ORAL 2 TIMES DAILY PRN
Qty: 60 TABLET | Refills: 3 | Status: SHIPPED | OUTPATIENT
Start: 2023-04-25 | End: 2023-08-23

## 2023-04-25 RX ORDER — LAMOTRIGINE 25 MG/1
TABLET ORAL
Qty: 30 TABLET | Refills: 0 | Status: SHIPPED | OUTPATIENT
Start: 2023-04-25

## 2023-04-25 RX ORDER — LAMOTRIGINE 100 MG/1
100 TABLET ORAL DAILY
Qty: 90 TABLET | Refills: 1 | Status: SHIPPED | OUTPATIENT
Start: 2023-04-25

## 2023-04-25 ASSESSMENT — PATIENT HEALTH QUESTIONNAIRE - PHQ9
8. MOVING OR SPEAKING SO SLOWLY THAT OTHER PEOPLE COULD HAVE NOTICED. OR THE OPPOSITE, BEING SO FIGETY OR RESTLESS THAT YOU HAVE BEEN MOVING AROUND A LOT MORE THAN USUAL: 0
SUM OF ALL RESPONSES TO PHQ QUESTIONS 1-9: 9
7. TROUBLE CONCENTRATING ON THINGS, SUCH AS READING THE NEWSPAPER OR WATCHING TELEVISION: 1
3. TROUBLE FALLING OR STAYING ASLEEP: 2
SUM OF ALL RESPONSES TO PHQ QUESTIONS 1-9: 9
2. FEELING DOWN, DEPRESSED OR HOPELESS: 1
10. IF YOU CHECKED OFF ANY PROBLEMS, HOW DIFFICULT HAVE THESE PROBLEMS MADE IT FOR YOU TO DO YOUR WORK, TAKE CARE OF THINGS AT HOME, OR GET ALONG WITH OTHER PEOPLE: 2
5. POOR APPETITE OR OVEREATING: 2
SUM OF ALL RESPONSES TO PHQ9 QUESTIONS 1 & 2: 1
SUM OF ALL RESPONSES TO PHQ QUESTIONS 1-9: 9
9. THOUGHTS THAT YOU WOULD BE BETTER OFF DEAD, OR OF HURTING YOURSELF: 0
SUM OF ALL RESPONSES TO PHQ QUESTIONS 1-9: 9
4. FEELING TIRED OR HAVING LITTLE ENERGY: 0
6. FEELING BAD ABOUT YOURSELF - OR THAT YOU ARE A FAILURE OR HAVE LET YOURSELF OR YOUR FAMILY DOWN: 3
1. LITTLE INTEREST OR PLEASURE IN DOING THINGS: 0

## 2023-04-25 ASSESSMENT — ANXIETY QUESTIONNAIRES
6. BECOMING EASILY ANNOYED OR IRRITABLE: 3
4. TROUBLE RELAXING: 3
7. FEELING AFRAID AS IF SOMETHING AWFUL MIGHT HAPPEN: 0
5. BEING SO RESTLESS THAT IT IS HARD TO SIT STILL: 1
1. FEELING NERVOUS, ANXIOUS, OR ON EDGE: 3
GAD7 TOTAL SCORE: 14
3. WORRYING TOO MUCH ABOUT DIFFERENT THINGS: 1
IF YOU CHECKED OFF ANY PROBLEMS ON THIS QUESTIONNAIRE, HOW DIFFICULT HAVE THESE PROBLEMS MADE IT FOR YOU TO DO YOUR WORK, TAKE CARE OF THINGS AT HOME, OR GET ALONG WITH OTHER PEOPLE: EXTREMELY DIFFICULT
2. NOT BEING ABLE TO STOP OR CONTROL WORRYING: 3

## 2023-04-25 NOTE — PROGRESS NOTES
Patient:  Tova David  Age:  34 y.o.  :  1990     SEX:  female MRN:  [id number]     RACE: White (non-)     SEEN:  [x]  PATIENT  []  SPOUSE []  OTHER:              PHQ-9  2023 3/28/2023 1/10/2023   Little interest or pleasure in doing things 0 0 0   Little interest or pleasure in doing things - - -   Feeling down, depressed, or hopeless 1 0 0   Trouble falling or staying asleep, or sleeping too much 2 0 1   Feeling tired or having little energy 0 0 0   Poor appetite or overeating 2 0 0   Feeling bad about yourself - or that you are a failure or have let yourself or your family down 3 0 0   Trouble concentrating on things, such as reading the newspaper or watching television 1 0 0   Moving or speaking so slowly that other people could have noticed. Or the opposite - being so fidgety or restless that you have been moving around a lot more than usual 0 0 0   Thoughts that you would be better off dead, or of hurting yourself in some way 0 0 0   PHQ-2 Score 1 0 0   Total Score PHQ 2 - - -   PHQ-9 Total Score 9 0 1   If you checked off any problems, how difficult have these problems made it for you to do your work, take care of things at home, or get along with other people?  2 0 1       ERNA-7 SCREENING 2023 1/10/2023 10/6/2022   Feeling nervous, anxious, or on edge Nearly every day Not at all Not at all   Not being able to stop or control worrying Nearly every day Not at all Not at all   Worrying too much about different things Several days Not at all Not at all   Trouble relaxing Nearly every day Several days Several days   Being so restless that it is hard to sit still Several days Several days Several days   Becoming easily annoyed or irritable Nearly every day Several days Several days   Feeling afraid as if something awful might happen Not at all Not at all Not at all   ERNA-7 Total Score 14 3 3   How difficult have these problems made it for you to do your work, take

## 2023-05-01 ENCOUNTER — OFFICE VISIT (OUTPATIENT)
Dept: BEHAVIORAL/MENTAL HEALTH CLINIC | Facility: CLINIC | Age: 33
End: 2023-05-01
Payer: COMMERCIAL

## 2023-05-01 DIAGNOSIS — F41.1 GENERALIZED ANXIETY DISORDER: Primary | ICD-10-CM

## 2023-05-01 DIAGNOSIS — F33.1 MDD (MAJOR DEPRESSIVE DISORDER), RECURRENT EPISODE, MODERATE (HCC): ICD-10-CM

## 2023-05-01 PROCEDURE — 90837 PSYTX W PT 60 MINUTES: CPT | Performed by: SOCIAL WORKER

## 2023-05-01 PROCEDURE — 1036F TOBACCO NON-USER: CPT | Performed by: SOCIAL WORKER

## 2023-05-01 NOTE — PROGRESS NOTES
INDIVIDUAL THERAPY NOTE  NAME: Jimmy Villafuerte    DATE: 5/1/2023    TYPE OF SERVICE: Individual Therapy    LOCATION OF SERVICE: [facility]    DURATION:    DIAGNOSIS: :    1. Generalized anxiety disorder    2. MDD (major depressive disorder), recurrent episode, moderate ([facility])        CHIEF COMPLAINT: depression and anxiety    MENTAL STATUS EXAM:  Pt was appropriately dressed and groomed. Pt was 0x4. No unusual mannerisms were noted. No psychotic symptoms displayed by pt. Pt was cooperative and engaged in the session. Insight and judgment were intact. Denied suicidal or homicidal ideation. Fund of knowledge and attention span are WNL. Denies developmental or language difficulties. Mood/Affect: depressed with congruent affect  Thought Process: linear and coherent    PLAN: CBT, DBT, CPT, Humanistic/Client Centered, ACT, Seeking Safety, Psychodynamic, ERP may be used to address goals. Summary of Service: This SW met with the pt face to face in the office. The pt reports she had a nice weekend with her  in [place]. Her parents kept her kids and it was a nice break. The pt states last Sunday, she became irritated with her son for not getting ready and she yelled at him too extremely. This SW and the pt processed what was going on that morning and what triggered her anger. The pt was already frustrated with her . And she realized she should have let her  get her son ready because her mood was agitated. Underlying sources of anger like anger, disappointment and stress were discussed. The pt has worked out a schedule where she can go to the gym again, and this helps her mood. How much of her stress was from work was also evaluated and setting boundaries to create w work/life balance was discussed.       Follow Up:2 weeks

## 2023-05-23 ENCOUNTER — OFFICE VISIT (OUTPATIENT)
Dept: BEHAVIORAL/MENTAL HEALTH CLINIC | Facility: CLINIC | Age: 33
End: 2023-05-23
Payer: COMMERCIAL

## 2023-05-23 DIAGNOSIS — F41.1 GENERALIZED ANXIETY DISORDER: Primary | ICD-10-CM

## 2023-05-23 DIAGNOSIS — F33.41 RECURRENT MAJOR DEPRESSIVE DISORDER, IN PARTIAL REMISSION (HCC): ICD-10-CM

## 2023-05-23 PROCEDURE — 1036F TOBACCO NON-USER: CPT | Performed by: SOCIAL WORKER

## 2023-05-23 PROCEDURE — 90837 PSYTX W PT 60 MINUTES: CPT | Performed by: SOCIAL WORKER

## 2023-05-23 NOTE — PROGRESS NOTES
INDIVIDUAL THERAPY NOTE  NAME: Rex Devine    DATE: 5/23/2023    TYPE OF SERVICE: Individual Therapy    LOCATION OF SERVICE: [facility]    DURATION: 60 mins    DIAGNOSIS: :    1. Generalized anxiety disorder    2. Recurrent major depressive disorder, in partial remission ([facility] [address]        CHIEF COMPLAINT: depression, anxiety    MENTAL STATUS EXAM:  Pt was appropriately dressed and groomed. Pt was 0x4. No unusual mannerisms were noted. No psychotic symptoms displayed by pt. Pt was cooperative and engaged in the session. Insight and judgment were intact. Denied suicidal or homicidal ideation. Fund of knowledge and attention span are WNL. Denies developmental or language difficulties. Mood/Affect: anxious with congruent affect  Thought Process: linear and coherent    PLAN: CBT, DBT, CPT, Humanistic/Client Centered, ACT, Seeking Safety, Psychodynamic, ERP may be used to address goals. Summary of Service: This SW met with  ept face to face in the office. The pt states she overall has been feeling better. She has been calmer and less angry. Her  was offered a promotion at work but they would have to move to [facility]. The pt also plans to apply for a different job at [employer] due to the ongoing stress and frustration with her current job. The pt wants to support her  but she has reservations about moving to [facility] [facility]. Her parents live here and she has friends. She is reluctant to give up this support but she also does not want her  to feel resentful if he passes. Thoughts and feelings were processed. Active listening and support were provided.       Follow Up: 2 weeks

## 2023-05-26 ENCOUNTER — TELEPHONE (OUTPATIENT)
Dept: BEHAVIORAL/MENTAL HEALTH CLINIC | Age: 33
End: 2023-05-26

## 2023-05-26 ENCOUNTER — PATIENT MESSAGE (OUTPATIENT)
Dept: BEHAVIORAL/MENTAL HEALTH CLINIC | Age: 33
End: 2023-05-26

## 2023-05-26 NOTE — TELEPHONE ENCOUNTER
From: Morenita Go  To: Dr. Buck Bene: 5/26/2023 10:35 AM EDT  Subject: Vyvanse refill request    Hi- requesting Vyvanse refill. Today was my last pill--we were out of town and I didn't realize my timing was so off for request reminder. Not to make it your urgency just wanted to let you know.     It can be sent to either [facility] in [place] or to [facility] (either will work for [employer] on this script)

## 2023-05-26 NOTE — TELEPHONE ENCOUNTER
----- Message from Chantel Paul sent at 5/26/2023 10:35 AM EDT -----  Regarding: Vyvanse refill request  Contact: [phone]  Hi- requesting Vyvanse refill. Today was my last pill--we were out of town and I didn't realize my timing was so off for request reminder. Not to make it your urgency just wanted to let you know.     It can be sent to either [facility] in [place] or to [facility] (either will work for [employer] on this script)

## 2023-05-26 NOTE — TELEPHONE ENCOUNTER
----- Message from Chantel Almanza sent at 5/26/2023 10:35 AM EDT -----  Regarding: Vyvanse refill request  Contact: [phone]  Hi- requesting Vyvanse refill. Today was my last pill--we were out of town and I didn't realize my timing was so off for request reminder. Not to make it your urgency just wanted to let you know.     It can be sent to either [facility] in [place] or to [facility] (either will work for [employer] on this script)

## 2023-06-06 ENCOUNTER — OFFICE VISIT (OUTPATIENT)
Dept: BEHAVIORAL/MENTAL HEALTH CLINIC | Facility: CLINIC | Age: 33
End: 2023-06-06
Payer: COMMERCIAL

## 2023-06-06 DIAGNOSIS — F33.41 RECURRENT MAJOR DEPRESSIVE DISORDER, IN PARTIAL REMISSION (HCC): ICD-10-CM

## 2023-06-06 DIAGNOSIS — F41.1 GENERALIZED ANXIETY DISORDER: Primary | ICD-10-CM

## 2023-06-06 PROCEDURE — 90837 PSYTX W PT 60 MINUTES: CPT | Performed by: SOCIAL WORKER

## 2023-06-06 PROCEDURE — 1036F TOBACCO NON-USER: CPT | Performed by: SOCIAL WORKER

## 2023-06-06 NOTE — PROGRESS NOTES
INDIVIDUAL THERAPY NOTE  NAME: Vanessa Barahona    DATE: 6/6/2023    TYPE OF SERVICE: Individual Therapy    LOCATION OF SERVICE: [facility]    DURATION: 60 mins    DIAGNOSIS: :    1. Generalized anxiety disorder    2. Recurrent major depressive disorder, in partial remission ([facility] [address]        CHIEF COMPLAINT: depression, anxiety    MENTAL STATUS EXAM:  Pt was appropriately dressed and groomed. Pt was 0x4. No unusual mannerisms were noted. No psychotic symptoms displayed by pt. Pt was cooperative and engaged in the session. Insight and judgment were intact. Denied suicidal or homicidal ideation. Fund of knowledge and attention span are WNL. Denies developmental or language difficulties. Mood/Affect:mildly anxious  Thought Process: congruent affect    PLAN: CBT, DBT, CPT, Humanistic/Client Centered, ACT, Seeking Safety, Psychodynamic, ERP may be used to address goals. Summary of Service: This SW met with  ept face to face in the office. The pt states a lot has happened. She and her  decided not to move to [facility]. Her  will most likely get a promotion without this being necessary. She was told she will be offered the position she interviewed for at [address] [place] [address] [employer]. She and her  put a bid on a house that is near her parents and they got the contract. They are in the process of selling their own house. This is a stressful time but also positive. She and her  have been getting along well together. The new job should alleviate a lot of her stress. She still has some anxiety. Her thoughts race and this can impede her sleep pattern. This SW talked to the pt about possible ways to improve her sleep pattern. How to cope with anxiety will continue to be discussed.       Follow Up: 2 weeks

## 2023-06-21 ENCOUNTER — OFFICE VISIT (OUTPATIENT)
Dept: BEHAVIORAL/MENTAL HEALTH CLINIC | Facility: CLINIC | Age: 33
End: 2023-06-21
Payer: COMMERCIAL

## 2023-06-21 DIAGNOSIS — F33.41 RECURRENT MAJOR DEPRESSIVE DISORDER, IN PARTIAL REMISSION (HCC): ICD-10-CM

## 2023-06-21 DIAGNOSIS — F41.1 GENERALIZED ANXIETY DISORDER: Primary | ICD-10-CM

## 2023-06-21 PROCEDURE — 1036F TOBACCO NON-USER: CPT | Performed by: SOCIAL WORKER

## 2023-06-21 PROCEDURE — 90837 PSYTX W PT 60 MINUTES: CPT | Performed by: SOCIAL WORKER

## 2023-06-21 NOTE — PROGRESS NOTES
INDIVIDUAL THERAPY NOTE  NAME: Sophy Psoada    DATE: 6/21/2023    TYPE OF SERVICE: Individual Therapy    LOCATION OF SERVICE: [facility]    DURATION: 60 mins    DIAGNOSIS: :    1. Generalized anxiety disorder    2. Recurrent major depressive disorder, in partial remission ([facility] [address]        CHIEF COMPLAINT: depression and anxiety    MENTAL STATUS EXAM:  Pt was appropriately dressed and groomed. Pt was 0x4. No unusual mannerisms were noted. No psychotic symptoms displayed by pt. Pt was cooperative and engaged in the session. Insight and judgment were intact. Denied suicidal or homicidal ideation. Fund of knowledge and attention span are WNL. Denies developmental or language difficulties. Mood/Affect: mildly anxious with congruent affect  Thought Process:linear and coherent    PLAN: CBT, DBT, CPT, Humanistic/Client Centered, ACT, Seeking Safety, Psychodynamic, ERP may be used to address goals. Summary of Service: This SW met with alirio ept face to face in the office. The pt states that she did get the job she applied for at [employer] where she works. She asked for a raise and this was granted. Her  also got a promotion. They got an offer on their house and it is currently under contract. The pt has been busy with the move but these changes are possible. Her son, however has been diagnosed with a platelet disorder that may be viral or autoimmune. It may heal on its own or he may need medication. This has been a source of stress for her. The pt states she has not stressed as much as she thought she would so she feels the medications are helping. She fears some that the medication may be blocking her feelings and she is not learning coping skills. Coping skills she has learned were reviewed and more coping skills were discussed. Active listening and support were provided.       Follow Up: 2 weeks

## 2023-07-05 ENCOUNTER — OFFICE VISIT (OUTPATIENT)
Dept: BEHAVIORAL/MENTAL HEALTH CLINIC | Facility: CLINIC | Age: 33
End: 2023-07-05
Payer: COMMERCIAL

## 2023-07-05 DIAGNOSIS — F41.1 GENERALIZED ANXIETY DISORDER: Primary | ICD-10-CM

## 2023-07-05 DIAGNOSIS — F33.41 RECURRENT MAJOR DEPRESSIVE DISORDER, IN PARTIAL REMISSION (HCC): ICD-10-CM

## 2023-07-05 PROCEDURE — 90837 PSYTX W PT 60 MINUTES: CPT | Performed by: SOCIAL WORKER

## 2023-07-05 PROCEDURE — 1036F TOBACCO NON-USER: CPT | Performed by: SOCIAL WORKER

## 2023-07-05 NOTE — PROGRESS NOTES
INDIVIDUAL THERAPY NOTE  NAME: Lennie Schirmer    DATE: 7/5/2023    TYPE OF SERVICE: Individual Therapy    LOCATION OF SERVICE: [facility]    DURATION: 55 mins    DIAGNOSIS: :    1. Generalized anxiety disorder    2. Recurrent major depressive disorder, in partial remission ([address] )        CHIEF COMPLAINT: anxiety, depression, stress    MENTAL STATUS EXAM:  Pt was appropriately dressed and groomed. Pt was 0x4. No unusual mannerisms were noted. No psychotic symptoms displayed by pt. Pt was cooperative and engaged in the session. Insight and judgment were intact. Denied suicidal or homicidal ideation. Fund of knowledge and attention span are WNL. Denies developmental or language difficulties. Mood/Affect: stressed with congruent affect  Thought Process: linear and coherent    PLAN: CBT, DBT, CPT, Humanistic/Client Centered, ACT, Seeking Safety, Psychodynamic, ERP may be used to address goals. Summary of Service: This SW met with the pt face to face in the office. The pt reports that she is under significant stress. Her son needed to be admitted to the hospital for a night for a transfusion for his blood condition. This happened the day before they needed to move to their new house. The pt also plans to start a new job on Monday, but she will still have responsibility for her old job for 30 days. This SW provided support and guided questions were asked to help the pt process. Stress management skills were discussed like pacing, chunking tasks, reframing negative thoughts and self care. The pt has been practicing breathing techniques. The Pretzel was taught as another tool to help the pt with heightened anxiety.       Follow Up: 2 weeks

## 2023-07-10 ENCOUNTER — PATIENT MESSAGE (OUTPATIENT)
Dept: BEHAVIORAL/MENTAL HEALTH CLINIC | Age: 33
End: 2023-07-10

## 2023-07-11 NOTE — TELEPHONE ENCOUNTER
From: Gilberto Edouard  To: Dr. Micaela Gonzalez: 7/10/2023 4:56 PM EDT  Subject: Vyvanse refill request    Request for Vyvanse refill as soon as possible.

## 2023-07-13 ENCOUNTER — TELEPHONE (OUTPATIENT)
Dept: PRIMARY CARE CLINIC | Facility: CLINIC | Age: 33
End: 2023-07-13

## 2023-07-15 DIAGNOSIS — F50.81 BINGE EATING DISORDER: ICD-10-CM

## 2023-07-17 NOTE — TELEPHONE ENCOUNTER
Patient notified. She advised that [facility] is out of stock as well and won't have it until the end of August. She is going to call around and see if she can find another pharmacy that may have it and let us know where to resend it.

## 2023-07-19 ENCOUNTER — TELEPHONE (OUTPATIENT)
Dept: BEHAVIORAL/MENTAL HEALTH CLINIC | Age: 33
End: 2023-07-19

## 2023-07-19 NOTE — TELEPHONE ENCOUNTER
----- Message from Roxane Campblel sent at 7/18/2023  8:56 PM EDT -----  Regarding: Vyvanse refill request  Contact: [phone]  Praneeth Arango, I have finally found a location that has the vyvanse n stock. it is the [facility] in [place] the address is below.     [address] [place] [address], [address] [place] [address]

## 2023-07-20 ENCOUNTER — OFFICE VISIT (OUTPATIENT)
Dept: BEHAVIORAL/MENTAL HEALTH CLINIC | Age: 33
End: 2023-07-20
Payer: COMMERCIAL

## 2023-07-20 VITALS
HEART RATE: 110 BPM | TEMPERATURE: 98.7 F | BODY MASS INDEX: 31.34 KG/M2 | SYSTOLIC BLOOD PRESSURE: 126 MMHG | OXYGEN SATURATION: 98 % | HEIGHT: 66 IN | WEIGHT: 195 LBS | DIASTOLIC BLOOD PRESSURE: 86 MMHG

## 2023-07-20 DIAGNOSIS — F41.1 GENERALIZED ANXIETY DISORDER: ICD-10-CM

## 2023-07-20 DIAGNOSIS — F51.02 ADJUSTMENT INSOMNIA: ICD-10-CM

## 2023-07-20 DIAGNOSIS — F50.81 BINGE EATING DISORDER: ICD-10-CM

## 2023-07-20 DIAGNOSIS — F33.42 RECURRENT MAJOR DEPRESSIVE DISORDER, IN FULL REMISSION (HCC): Primary | ICD-10-CM

## 2023-07-20 PROCEDURE — G8427 DOCREV CUR MEDS BY ELIG CLIN: HCPCS | Performed by: PSYCHIATRY & NEUROLOGY

## 2023-07-20 PROCEDURE — 1036F TOBACCO NON-USER: CPT | Performed by: PSYCHIATRY & NEUROLOGY

## 2023-07-20 PROCEDURE — 99214 OFFICE O/P EST MOD 30 MIN: CPT | Performed by: PSYCHIATRY & NEUROLOGY

## 2023-07-20 PROCEDURE — G8417 CALC BMI ABV UP PARAM F/U: HCPCS | Performed by: PSYCHIATRY & NEUROLOGY

## 2023-07-20 RX ORDER — ALPRAZOLAM 0.5 MG/1
0.5 TABLET ORAL 2 TIMES DAILY PRN
Qty: 60 TABLET | Refills: 3 | Status: SHIPPED | OUTPATIENT
Start: 2023-07-20 | End: 2023-11-17

## 2023-07-20 RX ORDER — LAMOTRIGINE 100 MG/1
100 TABLET ORAL DAILY
Qty: 90 TABLET | Refills: 1 | Status: SHIPPED | OUTPATIENT
Start: 2023-07-20

## 2023-07-20 RX ORDER — DULOXETIN HYDROCHLORIDE 60 MG/1
60 CAPSULE, DELAYED RELEASE ORAL DAILY
Qty: 90 CAPSULE | Refills: 1 | Status: SHIPPED | OUTPATIENT
Start: 2023-07-20

## 2023-07-20 ASSESSMENT — PATIENT HEALTH QUESTIONNAIRE - PHQ9
SUM OF ALL RESPONSES TO PHQ9 QUESTIONS 1 & 2: 0
4. FEELING TIRED OR HAVING LITTLE ENERGY: 1
10. IF YOU CHECKED OFF ANY PROBLEMS, HOW DIFFICULT HAVE THESE PROBLEMS MADE IT FOR YOU TO DO YOUR WORK, TAKE CARE OF THINGS AT HOME, OR GET ALONG WITH OTHER PEOPLE: 0
2. FEELING DOWN, DEPRESSED OR HOPELESS: 0
9. THOUGHTS THAT YOU WOULD BE BETTER OFF DEAD, OR OF HURTING YOURSELF: 0
SUM OF ALL RESPONSES TO PHQ QUESTIONS 1-9: 1
3. TROUBLE FALLING OR STAYING ASLEEP: 0
5. POOR APPETITE OR OVEREATING: 0
SUM OF ALL RESPONSES TO PHQ QUESTIONS 1-9: 1
7. TROUBLE CONCENTRATING ON THINGS, SUCH AS READING THE NEWSPAPER OR WATCHING TELEVISION: 0
6. FEELING BAD ABOUT YOURSELF - OR THAT YOU ARE A FAILURE OR HAVE LET YOURSELF OR YOUR FAMILY DOWN: 0
1. LITTLE INTEREST OR PLEASURE IN DOING THINGS: 0
SUM OF ALL RESPONSES TO PHQ QUESTIONS 1-9: 1
8. MOVING OR SPEAKING SO SLOWLY THAT OTHER PEOPLE COULD HAVE NOTICED. OR THE OPPOSITE, BEING SO FIGETY OR RESTLESS THAT YOU HAVE BEEN MOVING AROUND A LOT MORE THAN USUAL: 0
SUM OF ALL RESPONSES TO PHQ QUESTIONS 1-9: 1

## 2023-07-20 ASSESSMENT — ANXIETY QUESTIONNAIRES
4. TROUBLE RELAXING: 1
5. BEING SO RESTLESS THAT IT IS HARD TO SIT STILL: 0
3. WORRYING TOO MUCH ABOUT DIFFERENT THINGS: 0
6. BECOMING EASILY ANNOYED OR IRRITABLE: 0
GAD7 TOTAL SCORE: 1
7. FEELING AFRAID AS IF SOMETHING AWFUL MIGHT HAPPEN: 0
2. NOT BEING ABLE TO STOP OR CONTROL WORRYING: 0
1. FEELING NERVOUS, ANXIOUS, OR ON EDGE: 0
IF YOU CHECKED OFF ANY PROBLEMS ON THIS QUESTIONNAIRE, HOW DIFFICULT HAVE THESE PROBLEMS MADE IT FOR YOU TO DO YOUR WORK, TAKE CARE OF THINGS AT HOME, OR GET ALONG WITH OTHER PEOPLE: SOMEWHAT DIFFICULT

## 2023-07-20 NOTE — PROGRESS NOTES
Patient:  Mimi Jones  Age:  28 y.o.  :  1990     SEX:  female MRN:  [id number]     RACE: White (non-)     SEEN:  [x]  PATIENT  []  SPOUSE []  OTHER:              PHQ-9  2023 2023 3/28/2023   Little interest or pleasure in doing things 0 0 0   Little interest or pleasure in doing things - - -   Feeling down, depressed, or hopeless 0 1 0   Trouble falling or staying asleep, or sleeping too much 0 2 0   Feeling tired or having little energy 1 0 0   Poor appetite or overeating 0 2 0   Feeling bad about yourself - or that you are a failure or have let yourself or your family down 0 3 0   Trouble concentrating on things, such as reading the newspaper or watching television 0 1 0   Moving or speaking so slowly that other people could have noticed.  Or the opposite - being so fidgety or restless that you have been moving around a lot more than usual 0 0 0   Thoughts that you would be better off dead, or of hurting yourself in some way 0 0 0   PHQ-2 Score 0 1 0   Total Score PHQ 2 - - -   PHQ-9 Total Score 1 9 0   If you checked off any problems, how difficult have these problems made it for you to do your work, take care of things at home, or get along with other people? 0 2 0       ERNA-7 SCREENING 2023 2023 1/10/2023   Feeling nervous, anxious, or on edge Not at all Nearly every day Not at all   Not being able to stop or control worrying Not at all Nearly every day Not at all   Worrying too much about different things Not at all Several days Not at all   Trouble relaxing Several days Nearly every day Several days   Being so restless that it is hard to sit still Not at all Several days Several days   Becoming easily annoyed or irritable Not at all Nearly every day Several days   Feeling afraid as if something awful might happen Not at all Not at all Not at all   ERNA-7 Total Score 1 14 3   How difficult have these problems made it for you to do your work, take care

## 2023-08-18 ENCOUNTER — PATIENT MESSAGE (OUTPATIENT)
Dept: BEHAVIORAL/MENTAL HEALTH CLINIC | Age: 33
End: 2023-08-18

## 2023-08-18 ENCOUNTER — TELEPHONE (OUTPATIENT)
Dept: BEHAVIORAL/MENTAL HEALTH CLINIC | Age: 33
End: 2023-08-18

## 2023-08-18 RX ORDER — LEVOMEFOLATE CALCIUM 15 MG
15 TABLET ORAL DAILY
Qty: 30 TABLET | Refills: 3 | Status: SHIPPED | OUTPATIENT
Start: 2023-08-18

## 2023-08-18 NOTE — TELEPHONE ENCOUNTER
Patient does not need refill for vyvanse as it is already at the pharmacy, however she does need refill for L-methylfolate sent to [facility]. Patient is aware that Vyvanse was previously sent to [facility].

## 2023-08-18 NOTE — TELEPHONE ENCOUNTER
From: Lashell Riddle  To: Dr. Bermudez Bur: 8/18/2023 1:07 PM EDT  Subject: Vyvanse and L Methylfolate Refill Request    Mikhail Meng,    Happy Friday! I am messaging to let you know I will be out of my Vyvanse this upcoming week (last refill was filled 22 July) . In addition I am out of L Methylfolate. Trying to make sure I am ahead of my requests/pickups prior upcoming vacation. In addition, can you please ask Dr Wolf Lin to send those refills to [facility] in [address] [place] [address]? My other pharmacys used are still out of Vyvanse. Thank you!   Roxane Hernandez

## 2023-08-18 NOTE — TELEPHONE ENCOUNTER
----- Message from Lei Cruz sent at 8/18/2023  1:07 PM EDT -----  Regarding: Vyvanse and L Methylfolate Refill Request  Contact: [phone]  [employer],    Happy Friday! I am messaging to let you know I will be out of my Vyvanse this upcoming week (last refill was filled 22 July) . In addition I am out of L Methylfolate. Trying to make sure I am ahead of my requests/pickups prior upcoming vacation. In addition, can you please ask Dr Dipesh Gross to send those refills to [facility] in [address] [place] [address]? My other pharmacys used are still out of Vyvanse. Thank you!   Lei Paez

## 2023-08-21 ENCOUNTER — TELEPHONE (OUTPATIENT)
Dept: BEHAVIORAL/MENTAL HEALTH CLINIC | Age: 33
End: 2023-08-21

## 2023-08-21 NOTE — TELEPHONE ENCOUNTER
----- Message from Noris Bella sent at 8/21/2023  2:17 PM EDT -----  Regarding: Vyvanse Shortage  Contact: [phone]  Fariha--hope you are doing well. We spoke last week about [facility]. They are out of stock and I have called multiple pharmacies in the area (chains and local names) without luck of finding. I am currently in [place], [place] for work this week and have been able to locate a pharmacy that has 30 mg and 40 mg.  I have a couple questions on how to proceed from here:    1) Is it possible to have 2 scripts sent to a pharmacy (one for 30 mg and one for 40 mg) to total the 70 mg? Not sure if you know if insurance would fight this. 2) If above is not possible, is there an alternative medication for Vyvanse in my care plan? Instead should I switch down a dosage (40 mg)? 3)  Are there any issues with calling the script(s) into a pharmacy locally to [facility] so that I am able to  as I am out of town until Sept 2nd? If so, I would like to use the pharmacy below. I have confirmed they have 35s and 45s in stock. [address] [facility], [address] [facility] [address]    Heads up, I may not be able to answer my phone depending on timing as I may not have the ability to step out of the meetings I have this week but will try my best to answer as quickly as I can where possible. Thanks!

## 2023-08-22 DIAGNOSIS — F50.81 BINGE EATING DISORDER: ICD-10-CM

## 2023-08-22 NOTE — TELEPHONE ENCOUNTER
Pharmacy was added before sending the message. [facility] in [place] was selected as preferred pharmacy for the message and was selected as preferred pharmacy. Left message for patient.

## 2023-08-22 NOTE — TELEPHONE ENCOUNTER
Prescription sent to pharmacy in [place]. Please add the pharmacy when you are sending the request for prescription. That saves time.  Thanks

## 2023-09-08 ENCOUNTER — OFFICE VISIT (OUTPATIENT)
Dept: BEHAVIORAL/MENTAL HEALTH CLINIC | Facility: CLINIC | Age: 33
End: 2023-09-08
Payer: COMMERCIAL

## 2023-09-08 DIAGNOSIS — F33.41 RECURRENT MAJOR DEPRESSIVE DISORDER, IN PARTIAL REMISSION (HCC): ICD-10-CM

## 2023-09-08 DIAGNOSIS — F41.1 GENERALIZED ANXIETY DISORDER: Primary | ICD-10-CM

## 2023-09-08 PROCEDURE — 90837 PSYTX W PT 60 MINUTES: CPT | Performed by: SOCIAL WORKER

## 2023-09-08 PROCEDURE — 1036F TOBACCO NON-USER: CPT | Performed by: SOCIAL WORKER

## 2023-09-08 NOTE — PROGRESS NOTES
INDIVIDUAL THERAPY NOTE  NAME: Cinthia Cano    DATE: 9/8/2023    TYPE OF SERVICE: Individual Therapy    LOCATION OF SERVICE: [facility]    DURATION: 60    DIAGNOSIS: :    1. Generalized anxiety disorder    2. Recurrent major depressive disorder, in partial remission ([address] [facility])        CHIEF COMPLAINT: depression, anxiety    MENTAL STATUS EXAM:  Pt was appropriately dressed and groomed. Pt was 0x4. No unusual mannerisms were noted. No psychotic symptoms displayed by pt. Pt was cooperative and engaged in the session. Insight and judgment were intact. Denied suicidal or homicidal ideation. Fund of knowledge and attention span are WNL. Denies developmental or language difficulties. Mood/Affect: anxious, mildly depressed  Thought Process: coherent    PLAN: CBT, DBT, CPT, Humanistic/Client Centered, ACT, Seeking Safety, Psychodynamic, ERP may be used to address goals. Summary of Service: This SW met with the pt face to face in the office. The pt reports that a lot has happened. She says she has been working at home. She has had trouble adjusting to her new job and no one has been around to ask for concrete guidance. She has been more distracted by home projects when she should be working. She is getting home projects done that she is excited about, but procrastinating on others. This SW asked guided questions and provided support. An ADHD screen was done and it did indicate the need for further evaluation and she will address this with Dr Diogo Cruz at their next appt. Ways to manage and self forgiveness were both discussed. The pt's son has been having meltdowns due to the limitations she has to impose due to his ITP. He does not understand why he is being told no with things he could do before. She and her  have been working well together  but it has been stressful. This SW validated her good work as a mother. The importance of self care was discussed.   Pt was given

## 2023-09-22 ENCOUNTER — OFFICE VISIT (OUTPATIENT)
Dept: BEHAVIORAL/MENTAL HEALTH CLINIC | Facility: CLINIC | Age: 33
End: 2023-09-22
Payer: COMMERCIAL

## 2023-09-22 DIAGNOSIS — F33.41 RECURRENT MAJOR DEPRESSIVE DISORDER, IN PARTIAL REMISSION (HCC): ICD-10-CM

## 2023-09-22 DIAGNOSIS — F41.1 GENERALIZED ANXIETY DISORDER: Primary | ICD-10-CM

## 2023-09-22 DIAGNOSIS — F50.81 BINGE EATING DISORDER: ICD-10-CM

## 2023-09-22 PROCEDURE — 90837 PSYTX W PT 60 MINUTES: CPT | Performed by: SOCIAL WORKER

## 2023-09-22 PROCEDURE — 1036F TOBACCO NON-USER: CPT | Performed by: SOCIAL WORKER

## 2023-09-22 NOTE — PROGRESS NOTES
INDIVIDUAL THERAPY NOTE  NAME: Cinthia Cano    DATE: 9/22/2023    TYPE OF SERVICE: Individual Therapy    LOCATION OF SERVICE: [facility]    DURATION: 55 mins    DIAGNOSIS: :    1. Generalized anxiety disorder    2. Recurrent major depressive disorder, in partial remission ([address] )    3. Binge eating disorder        CHIEF COMPLAINT: depression and anxiety    MENTAL STATUS EXAM:  Pt was appropriately dressed and groomed. Pt was 0x4. No unusual mannerisms were noted. No psychotic symptoms displayed by pt. Pt was cooperative and engaged in the session. Insight and judgment were intact. Denied suicidal or homicidal ideation. Fund of knowledge and attention span are WNL. Denies developmental or language difficulties. Mood/Affect: mildly depressed with congruent affect  Thought Process: coherent    PLAN: CBT, DBT, CPT, Humanistic/Client Centered, ACT, Seeking Safety, Psychodynamic, ERP may be used to address goals. Summary of Service: This SW met with the pt face to face in the office. The pt states overall she has been doing well. Her son platelet count is up, which is good news. His ITP will have to be monitored closely, though, and he still has to limit activity. He has been having tantrums and night terrors. This started about the time they moved and the ITP was diagnosed. Her pediatrician has referred him for a sleep study and to an PCIT therapist and the pt hopes this will help. Support was given. She and her  have been communicating well. The pt is getting more accustomed to her new job. She has to travel out of state for work next week and she is looking forward to the break. The pt reports she has not had binge eating episodes. Guided questions were asked and this SW validated the pt's progress.       Follow Up: 2 weeks

## 2023-10-04 ENCOUNTER — PATIENT MESSAGE (OUTPATIENT)
Dept: BEHAVIORAL/MENTAL HEALTH CLINIC | Age: 33
End: 2023-10-04

## 2023-10-05 ENCOUNTER — TELEPHONE (OUTPATIENT)
Dept: BEHAVIORAL/MENTAL HEALTH CLINIC | Age: 33
End: 2023-10-05

## 2023-10-05 NOTE — TELEPHONE ENCOUNTER
----- Message from Noris Ramirez sent at 10/4/2023 10:31 PM EDT -----  Regarding: Vyvanse refill  adjustment  Contact: [phone]. Hope you are doing well. I am continuing to have difficulty finding Vyvanse 70mg in stock. [facility] in [address] [place] [address] ([facility]) has 20mg and 50 mg in stock if Dr Farnaz Alas could call in a split prescription again that would be great.  [facility] and [facility] are both out of stock of 70 mg    Thanks  Noris man

## 2023-10-06 ENCOUNTER — OFFICE VISIT (OUTPATIENT)
Dept: BEHAVIORAL/MENTAL HEALTH CLINIC | Facility: CLINIC | Age: 33
End: 2023-10-06
Payer: COMMERCIAL

## 2023-10-06 DIAGNOSIS — F41.1 GENERALIZED ANXIETY DISORDER: Primary | ICD-10-CM

## 2023-10-06 DIAGNOSIS — F50.81 BINGE EATING DISORDER: ICD-10-CM

## 2023-10-06 DIAGNOSIS — F33.41 RECURRENT MAJOR DEPRESSIVE DISORDER, IN PARTIAL REMISSION (HCC): ICD-10-CM

## 2023-10-06 PROCEDURE — 90837 PSYTX W PT 60 MINUTES: CPT | Performed by: SOCIAL WORKER

## 2023-10-06 PROCEDURE — 1036F TOBACCO NON-USER: CPT | Performed by: SOCIAL WORKER

## 2023-10-06 RX ORDER — LISDEXAMFETAMINE DIMESYLATE CAPSULES 20 MG/1
CAPSULE ORAL
Qty: 30 CAPSULE | Refills: 0 | Status: SHIPPED | OUTPATIENT
Start: 2023-10-06 | End: 2023-11-05

## 2023-10-06 RX ORDER — LISDEXAMFETAMINE DIMESYLATE CAPSULES 50 MG/1
CAPSULE ORAL
Qty: 30 CAPSULE | Refills: 0 | Status: SHIPPED | OUTPATIENT
Start: 2023-10-06 | End: 2023-11-05

## 2023-10-06 NOTE — PROGRESS NOTES
INDIVIDUAL THERAPY NOTE  NAME: Jaci Spatz    DATE: 10/6/2023    TYPE OF SERVICE: Individual Therapy    LOCATION OF SERVICE: [facility]    DURATION: 60 mins    DIAGNOSIS: :    1. Generalized anxiety disorder    2. Recurrent major depressive disorder, in partial remission ([facility])    3. Binge eating disorder        CHIEF COMPLAINT: depression, anxiety, binge eating    MENTAL STATUS EXAM:  Pt was appropriately dressed and groomed. Pt was 0x4. No unusual mannerisms were noted. No psychotic symptoms displayed by pt. Pt was cooperative and engaged in the session. Insight and judgment were intact. Denied suicidal or homicidal ideation. Fund of knowledge and attention span are WNL. Denies developmental or language difficulties. Mood/Affect: mildly depressed with congruent affect  Thought Process: linear and coherent    PLAN: CBT, DBT, CPT, Humanistic/Client Centered, ACT, Seeking Safety, Psychodynamic, ERP may be used to address goals. Summary of Service: This SW met with the pt face to face in the office. The pt reports her son is doing better. She and her  are getting along well and she is liking her new job. The pt still struggles with some binge eating. She has not been able to get her Vyvanse Rx filled due to a shortage. How this affected her eating was discussed. When she wants to avoid a boring work task, she finds she will distract and procrastinate with food. Alternative coping skills were discussed. Changing one;s mindset about food was discussed in order to get off the diet wheel and have a health relationship with movement and food. Mindful eating was discussed. Support and active lsisening were provided.       Follow Up: 2 weeks

## 2023-10-20 ENCOUNTER — OFFICE VISIT (OUTPATIENT)
Dept: BEHAVIORAL/MENTAL HEALTH CLINIC | Facility: CLINIC | Age: 33
End: 2023-10-20

## 2023-10-20 DIAGNOSIS — F50.81 BINGE EATING DISORDER: ICD-10-CM

## 2023-10-20 DIAGNOSIS — F41.1 GENERALIZED ANXIETY DISORDER: Primary | ICD-10-CM

## 2023-10-20 DIAGNOSIS — F33.41 RECURRENT MAJOR DEPRESSIVE DISORDER, IN PARTIAL REMISSION (HCC): ICD-10-CM

## 2023-10-20 NOTE — PROGRESS NOTES
INDIVIDUAL THERAPY NOTE  NAME: Radha Serrano    DATE: 10/20/2023    TYPE OF SERVICE: Individual Therapy    LOCATION OF SERVICE: [facility]    DURATION: 55 mins    DIAGNOSIS: :    1. Generalized anxiety disorder    2. Recurrent major depressive disorder, in partial remission ([address] )    3. Binge eating disorder        CHIEF COMPLAINT: depression and anxiety    MENTAL STATUS EXAM:  Pt was appropriately dressed and groomed. Pt was 0x4. No unusual mannerisms were noted. No psychotic symptoms displayed by pt. Pt was cooperative and engaged in the session. Insight and judgment were intact. Denied suicidal or homicidal ideation. Fund of knowledge and attention span are WNL. Denies developmental or language difficulties. Mood/Affect: even with congruent affect  Thought Process: linear and coherent    PLAN: CBT, DBT, CPT, Humanistic/Client Centered, ACT, Seeking Safety, Psychodynamic, ERP may be used to address goals. Summary of Service: This SW met with the pt face to face in the office. The pt reports that she has been doing okay. Her son's platelet counts continue to rise and he is not longer on any restrictions. She states her home life has been stable. She and her  are doing well. She still struggles with healthier eating. She has not been binging over the past several weeks, but she does not make healthy choices. She generally does not like to cook and food prep. Ways to eat healthy that are not time consuming was discussed. What triggers her unhealthy episodes was discussed. The pt is trying to be aware of her hunger type, ie bored hunger, emotional hunger, etc and act accordingly. This was reinforced. The pt's ADHD interferes with her ability to be productive at times. Coping skills like setting small goals was discussed. The ACT concept of turning off the Struggle Switch was also discussed.       Follow Up: 2 weeks

## 2023-10-27 ENCOUNTER — TELEPHONE (OUTPATIENT)
Dept: BEHAVIORAL/MENTAL HEALTH CLINIC | Age: 33
End: 2023-10-27

## 2023-10-27 ENCOUNTER — PATIENT MESSAGE (OUTPATIENT)
Dept: BEHAVIORAL/MENTAL HEALTH CLINIC | Age: 33
End: 2023-10-27

## 2023-10-27 NOTE — TELEPHONE ENCOUNTER
----- Message from Noris Saul sent at 10/27/2023  7:25 AM EDT -----  Regarding: Medication Interaxtuon Question  Contact: [phone]  Happy Friday!  Is Famotadine (Pepcid) okay for me to take with the other medications i am on?

## 2023-10-31 ENCOUNTER — OFFICE VISIT (OUTPATIENT)
Dept: BEHAVIORAL/MENTAL HEALTH CLINIC | Age: 33
End: 2023-10-31
Payer: COMMERCIAL

## 2023-10-31 VITALS
DIASTOLIC BLOOD PRESSURE: 78 MMHG | HEIGHT: 66 IN | SYSTOLIC BLOOD PRESSURE: 118 MMHG | TEMPERATURE: 98.4 F | BODY MASS INDEX: 31.82 KG/M2 | OXYGEN SATURATION: 95 % | WEIGHT: 198 LBS | HEART RATE: 88 BPM

## 2023-10-31 DIAGNOSIS — Z65.8 PSYCHOSOCIAL STRESSORS: ICD-10-CM

## 2023-10-31 DIAGNOSIS — F33.41 RECURRENT MAJOR DEPRESSIVE DISORDER, IN PARTIAL REMISSION (HCC): Primary | ICD-10-CM

## 2023-10-31 DIAGNOSIS — F51.02 ADJUSTMENT INSOMNIA: ICD-10-CM

## 2023-10-31 DIAGNOSIS — F50.81 BINGE EATING DISORDER: ICD-10-CM

## 2023-10-31 DIAGNOSIS — F98.8 ATTENTION DEFICIT DISORDER, UNSPECIFIED HYPERACTIVITY PRESENCE: ICD-10-CM

## 2023-10-31 DIAGNOSIS — F41.1 GENERALIZED ANXIETY DISORDER: ICD-10-CM

## 2023-10-31 PROCEDURE — 90833 PSYTX W PT W E/M 30 MIN: CPT | Performed by: PSYCHIATRY & NEUROLOGY

## 2023-10-31 PROCEDURE — 99213 OFFICE O/P EST LOW 20 MIN: CPT | Performed by: PSYCHIATRY & NEUROLOGY

## 2023-10-31 RX ORDER — LISDEXAMFETAMINE DIMESYLATE CAPSULES 70 MG/1
70 CAPSULE ORAL EVERY MORNING
Qty: 30 CAPSULE | Refills: 0 | Status: SHIPPED | OUTPATIENT
Start: 2023-12-08 | End: 2023-10-31 | Stop reason: SDUPTHER

## 2023-10-31 RX ORDER — LISDEXAMFETAMINE DIMESYLATE CAPSULES 70 MG/1
70 CAPSULE ORAL EVERY MORNING
Qty: 30 CAPSULE | Refills: 0 | Status: SHIPPED | OUTPATIENT
Start: 2024-01-07 | End: 2024-02-06

## 2023-10-31 RX ORDER — ALPRAZOLAM 0.5 MG/1
0.5 TABLET ORAL 2 TIMES DAILY PRN
Qty: 60 TABLET | Refills: 3 | Status: SHIPPED | OUTPATIENT
Start: 2023-10-31 | End: 2024-02-28

## 2023-10-31 RX ORDER — LISDEXAMFETAMINE DIMESYLATE CAPSULES 70 MG/1
70 CAPSULE ORAL EVERY MORNING
Qty: 30 CAPSULE | Refills: 0 | Status: SHIPPED | OUTPATIENT
Start: 2023-11-08 | End: 2023-10-31 | Stop reason: SDUPTHER

## 2023-10-31 RX ORDER — DULOXETIN HYDROCHLORIDE 60 MG/1
60 CAPSULE, DELAYED RELEASE ORAL DAILY
Qty: 90 CAPSULE | Refills: 1 | Status: SHIPPED | OUTPATIENT
Start: 2023-10-31

## 2023-10-31 RX ORDER — LAMOTRIGINE 100 MG/1
100 TABLET ORAL DAILY
Qty: 90 TABLET | Refills: 1 | Status: SHIPPED | OUTPATIENT
Start: 2023-10-31

## 2023-10-31 ASSESSMENT — PATIENT HEALTH QUESTIONNAIRE - PHQ9
SUM OF ALL RESPONSES TO PHQ QUESTIONS 1-9: 3
8. MOVING OR SPEAKING SO SLOWLY THAT OTHER PEOPLE COULD HAVE NOTICED. OR THE OPPOSITE, BEING SO FIGETY OR RESTLESS THAT YOU HAVE BEEN MOVING AROUND A LOT MORE THAN USUAL: 0
2. FEELING DOWN, DEPRESSED OR HOPELESS: 1
6. FEELING BAD ABOUT YOURSELF - OR THAT YOU ARE A FAILURE OR HAVE LET YOURSELF OR YOUR FAMILY DOWN: 1
5. POOR APPETITE OR OVEREATING: 0
9. THOUGHTS THAT YOU WOULD BE BETTER OFF DEAD, OR OF HURTING YOURSELF: 0
4. FEELING TIRED OR HAVING LITTLE ENERGY: 0
1. LITTLE INTEREST OR PLEASURE IN DOING THINGS: 0
7. TROUBLE CONCENTRATING ON THINGS, SUCH AS READING THE NEWSPAPER OR WATCHING TELEVISION: 0
SUM OF ALL RESPONSES TO PHQ9 QUESTIONS 1 & 2: 1
3. TROUBLE FALLING OR STAYING ASLEEP: 1
10. IF YOU CHECKED OFF ANY PROBLEMS, HOW DIFFICULT HAVE THESE PROBLEMS MADE IT FOR YOU TO DO YOUR WORK, TAKE CARE OF THINGS AT HOME, OR GET ALONG WITH OTHER PEOPLE: 1
SUM OF ALL RESPONSES TO PHQ QUESTIONS 1-9: 3

## 2023-10-31 ASSESSMENT — ANXIETY QUESTIONNAIRES
1. FEELING NERVOUS, ANXIOUS, OR ON EDGE: 1
IF YOU CHECKED OFF ANY PROBLEMS ON THIS QUESTIONNAIRE, HOW DIFFICULT HAVE THESE PROBLEMS MADE IT FOR YOU TO DO YOUR WORK, TAKE CARE OF THINGS AT HOME, OR GET ALONG WITH OTHER PEOPLE: SOMEWHAT DIFFICULT
5. BEING SO RESTLESS THAT IT IS HARD TO SIT STILL: 1
4. TROUBLE RELAXING: 1
2. NOT BEING ABLE TO STOP OR CONTROL WORRYING: 1
GAD7 TOTAL SCORE: 5
7. FEELING AFRAID AS IF SOMETHING AWFUL MIGHT HAPPEN: 0
3. WORRYING TOO MUCH ABOUT DIFFERENT THINGS: 1
6. BECOMING EASILY ANNOYED OR IRRITABLE: 0

## 2023-10-31 NOTE — PROGRESS NOTES
today and updated as necessary.      Compliant with medication: Yes   Side effects from medications:  No           No data to display                   EXAMINATION  Musculoskeletal    GAIT AND STATION   [x] WNL   [] RESTRICTED   [] UNSTEADY WALK        [] ABNORMAL   [] UNBALANCED  [] WHEELCHAIR/  WALKER/CANE     PSYCHIATRIC    PSYCHOMOTOR   [x]  WNL   [] RETARDATION   [] AGITATION            GENERAL APPEARANCE:   [x]  WELL GROOMED []     DISHEVELED   []  UNKEMPT      []  UNUSUAL/BIZZARE    [] WNL       ATTITUDE:   [x] COOPERATIVE   [] GUARDED   [] SUSPICIOUS      [] HOSTILE                            BEHAVIOR:   [x] CALM   [] HYPERACTIVE   [] MANNERISMS      [] BIZZARE         SPEECH:   [x] NORMAL FOR CLIENT   [] SPONTANEOUS   [] SLURRED   [] WHISPERING      [] LOUD   [] PRESSURED   [] ARTICULATE        EYE CONTACT:   [x] WNL   [] BLANK STARE   [] INTENSE      [] AVOIDANT         MOOD:   [] EUTHYMIC   [x] ANXIOUS   [x] DEPRESSED      [] IRRITABLE   [] ANGRY   [] APATHETIC     AFFECT:   [x] CONGRUENT WITH MOOD   [] FLAT   [] CONSTRICTED      [] INAPPROPRIATE   [] LABILE           THOUGHT PROCESS:   [x] LOGICAL/GOAL-DIRECTED   [] FOI   [] CIRCUMSANTIAL      [] INCOHERENT   [] TANGENTIAL   [] CONCRETE      [] PERSEVERATION           THOUGHT CONTENT:                DELUSIONS  [x] DENIES  [] GRANDIOSE  [] PERSECUTORY  [] Zoroastrianism  [] REFERENCE   HALLUCINATIONS  [x] DENIES  [] AUDITORY  [] VISUAL  [] OLFACTORY  [] TACTILE     [] GUSTATORY  [] SOMATIC         OBSESSIONS  [x] DENIES  [] PRESENT         SUICIDAL IDEATION  [x] DENIES  [] PRESENT W/O PLAN  [] PRESENT W/ PLAN       HOMICIDAL IDEATION  [x] DENIES  [] PRESENT W/O PLAN  [] PRESENT W/ PLAN           JUDGEMENT:   [x] GOOD   [] FAIR   [] POOR   INSIGHT:   [x] GOOD   [] FAIR   [] POOR     COGNITION:           SENSORIUM:   [x] ALERT   [] CLOUDED   [] DROWSY     ORIENTATION:   [x] INTACT   [] TIME:  PLACE  PERSON   RECENT & REMOTE MEMORY:   [x] NORMAL   [] OTHER:

## 2024-01-05 ENCOUNTER — TELEPHONE (OUTPATIENT)
Dept: BEHAVIORAL/MENTAL HEALTH CLINIC | Age: 34
End: 2024-01-05

## 2024-01-05 NOTE — TELEPHONE ENCOUNTER
Patient called requesting vyvanse  70 mg,her pharmacy is out of stock and patient is requesting to transfer to a different pharmacy        [facility] [address]  Phone: [phone]    Thank you

## 2024-01-08 DIAGNOSIS — F50.81 BINGE EATING DISORDER: ICD-10-CM

## 2024-01-08 RX ORDER — LISDEXAMFETAMINE DIMESYLATE CAPSULES 70 MG/1
70 CAPSULE ORAL EVERY MORNING
Qty: 30 CAPSULE | Refills: 0 | Status: SHIPPED | OUTPATIENT
Start: 2024-01-08 | End: 2024-02-07

## 2024-01-24 ENCOUNTER — PATIENT MESSAGE (OUTPATIENT)
Dept: BEHAVIORAL/MENTAL HEALTH CLINIC | Age: 34
End: 2024-01-24

## 2024-01-24 ENCOUNTER — TELEPHONE (OUTPATIENT)
Dept: BEHAVIORAL/MENTAL HEALTH CLINIC | Age: 34
End: 2024-01-24

## 2024-01-24 ENCOUNTER — OFFICE VISIT (OUTPATIENT)
Dept: BEHAVIORAL/MENTAL HEALTH CLINIC | Facility: CLINIC | Age: 34
End: 2024-01-24
Payer: COMMERCIAL

## 2024-01-24 DIAGNOSIS — F33.41 RECURRENT MAJOR DEPRESSIVE DISORDER, IN PARTIAL REMISSION (HCC): ICD-10-CM

## 2024-01-24 DIAGNOSIS — F50.81 BINGE EATING DISORDER: Primary | ICD-10-CM

## 2024-01-24 PROCEDURE — 1036F TOBACCO NON-USER: CPT | Performed by: SOCIAL WORKER

## 2024-01-24 PROCEDURE — 90837 PSYTX W PT 60 MINUTES: CPT | Performed by: SOCIAL WORKER

## 2024-01-24 NOTE — TELEPHONE ENCOUNTER
----- Message from Sarah Carter sent at 1/24/2024 11:05 AM EST -----  Regarding: Vyvanse pharmacy change  Contact: [phone]  Chacha-- can Dr Borges call my Vyvanse into [facility] on [address]? They have 70 mg name brand on hand.    I have been off pills for about a week as I have been unable to find a pharmacy with stock/order is processing with existing pharmacy and stocks out before mine is filled in the waitlist.    Thanks!
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
## 2024-01-25 DIAGNOSIS — F50.81 BINGE EATING DISORDER: ICD-10-CM

## 2024-01-25 RX ORDER — LISDEXAMFETAMINE DIMESYLATE CAPSULES 70 MG/1
70 CAPSULE ORAL EVERY MORNING
Qty: 30 CAPSULE | Refills: 0 | Status: SHIPPED | OUTPATIENT
Start: 2024-01-25 | End: 2024-02-24

## 2024-01-30 ENCOUNTER — OFFICE VISIT (OUTPATIENT)
Dept: BEHAVIORAL/MENTAL HEALTH CLINIC | Age: 34
End: 2024-01-30
Payer: COMMERCIAL

## 2024-01-30 VITALS
WEIGHT: 201 LBS | DIASTOLIC BLOOD PRESSURE: 80 MMHG | HEART RATE: 130 BPM | BODY MASS INDEX: 32.3 KG/M2 | TEMPERATURE: 99.9 F | OXYGEN SATURATION: 98 % | HEIGHT: 66 IN | SYSTOLIC BLOOD PRESSURE: 128 MMHG

## 2024-01-30 DIAGNOSIS — Z65.8 PSYCHOSOCIAL STRESSORS: ICD-10-CM

## 2024-01-30 DIAGNOSIS — F98.8 ATTENTION DEFICIT DISORDER, UNSPECIFIED HYPERACTIVITY PRESENCE: ICD-10-CM

## 2024-01-30 DIAGNOSIS — F41.1 GENERALIZED ANXIETY DISORDER: ICD-10-CM

## 2024-01-30 DIAGNOSIS — F51.02 ADJUSTMENT INSOMNIA: ICD-10-CM

## 2024-01-30 DIAGNOSIS — F33.41 RECURRENT MAJOR DEPRESSIVE DISORDER, IN PARTIAL REMISSION (HCC): Primary | ICD-10-CM

## 2024-01-30 DIAGNOSIS — F50.81 BINGE EATING DISORDER: ICD-10-CM

## 2024-01-30 PROCEDURE — 99214 OFFICE O/P EST MOD 30 MIN: CPT | Performed by: PSYCHIATRY & NEUROLOGY

## 2024-01-30 PROCEDURE — G8427 DOCREV CUR MEDS BY ELIG CLIN: HCPCS | Performed by: PSYCHIATRY & NEUROLOGY

## 2024-01-30 RX ORDER — LISDEXAMFETAMINE DIMESYLATE 70 MG/1
70 CAPSULE ORAL EVERY MORNING
Qty: 30 CAPSULE | Refills: 0 | Status: SHIPPED | OUTPATIENT
Start: 2024-02-28 | End: 2024-03-29

## 2024-01-30 RX ORDER — ALPRAZOLAM 0.5 MG/1
0.5 TABLET ORAL 2 TIMES DAILY PRN
Qty: 60 TABLET | Refills: 3 | Status: SHIPPED | OUTPATIENT
Start: 2024-01-30 | End: 2024-05-29

## 2024-01-30 RX ORDER — LAMOTRIGINE 100 MG/1
100 TABLET ORAL DAILY
Qty: 90 TABLET | Refills: 1 | Status: SHIPPED | OUTPATIENT
Start: 2024-01-30

## 2024-01-30 RX ORDER — DULOXETIN HYDROCHLORIDE 60 MG/1
60 CAPSULE, DELAYED RELEASE ORAL DAILY
Qty: 90 CAPSULE | Refills: 1 | Status: SHIPPED | OUTPATIENT
Start: 2024-01-30

## 2024-01-30 ASSESSMENT — ANXIETY QUESTIONNAIRES
1. FEELING NERVOUS, ANXIOUS, OR ON EDGE: 1
6. BECOMING EASILY ANNOYED OR IRRITABLE: 0
5. BEING SO RESTLESS THAT IT IS HARD TO SIT STILL: 1
3. WORRYING TOO MUCH ABOUT DIFFERENT THINGS: 1
2. NOT BEING ABLE TO STOP OR CONTROL WORRYING: 1
4. TROUBLE RELAXING: 2
IF YOU CHECKED OFF ANY PROBLEMS ON THIS QUESTIONNAIRE, HOW DIFFICULT HAVE THESE PROBLEMS MADE IT FOR YOU TO DO YOUR WORK, TAKE CARE OF THINGS AT HOME, OR GET ALONG WITH OTHER PEOPLE: SOMEWHAT DIFFICULT
GAD7 TOTAL SCORE: 6

## 2024-01-30 ASSESSMENT — PATIENT HEALTH QUESTIONNAIRE - PHQ9
5. POOR APPETITE OR OVEREATING: 0
SUM OF ALL RESPONSES TO PHQ QUESTIONS 1-9: 6
3. TROUBLE FALLING OR STAYING ASLEEP: 2
SUM OF ALL RESPONSES TO PHQ9 QUESTIONS 1 & 2: 0
8. MOVING OR SPEAKING SO SLOWLY THAT OTHER PEOPLE COULD HAVE NOTICED. OR THE OPPOSITE, BEING SO FIGETY OR RESTLESS THAT YOU HAVE BEEN MOVING AROUND A LOT MORE THAN USUAL: 0
6. FEELING BAD ABOUT YOURSELF - OR THAT YOU ARE A FAILURE OR HAVE LET YOURSELF OR YOUR FAMILY DOWN: 1
SUM OF ALL RESPONSES TO PHQ QUESTIONS 1-9: 6
SUM OF ALL RESPONSES TO PHQ QUESTIONS 1-9: 6
7. TROUBLE CONCENTRATING ON THINGS, SUCH AS READING THE NEWSPAPER OR WATCHING TELEVISION: 1
SUM OF ALL RESPONSES TO PHQ QUESTIONS 1-9: 6
1. LITTLE INTEREST OR PLEASURE IN DOING THINGS: 0
10. IF YOU CHECKED OFF ANY PROBLEMS, HOW DIFFICULT HAVE THESE PROBLEMS MADE IT FOR YOU TO DO YOUR WORK, TAKE CARE OF THINGS AT HOME, OR GET ALONG WITH OTHER PEOPLE: 2
2. FEELING DOWN, DEPRESSED OR HOPELESS: 0
9. THOUGHTS THAT YOU WOULD BE BETTER OFF DEAD, OR OF HURTING YOURSELF: 0
4. FEELING TIRED OR HAVING LITTLE ENERGY: 2

## 2024-01-30 NOTE — PROGRESS NOTES
Patient:  Sarah Carter  Age:  33 y.o.  :  1990     SEX:  female MRN:  [id number]     RACE: White (non-)     SEEN:  [x]  PATIENT  []  SPOUSE []  OTHER:                  2024     2:52 PM 10/31/2023    10:29 AM 2023     3:20 PM   PHQ-9    Little interest or pleasure in doing things 0 0 0   Feeling down, depressed, or hopeless 0 1 0   Trouble falling or staying asleep, or sleeping too much 2 1 0   Feeling tired or having little energy 2 0 1   Poor appetite or overeating 0 0 0   Feeling bad about yourself - or that you are a failure or have let yourself or your family down 1 1 0   Trouble concentrating on things, such as reading the newspaper or watching television 1 0 0   Moving or speaking so slowly that other people could have noticed. Or the opposite - being so fidgety or restless that you have been moving around a lot more than usual 0 0 0   Thoughts that you would be better off dead, or of hurting yourself in some way 0 0 0   PHQ-2 Score 0 1 0   PHQ-9 Total Score 6 3 1   If you checked off any problems, how difficult have these problems made it for you to do your work, take care of things at home, or get along with other people? 2 1 0           2024     2:56 PM 10/31/2023    10:30 AM 2023     3:20 PM   ERNA-7 SCREENING   Feeling nervous, anxious, or on edge Several days Several days Not at all   Not being able to stop or control worrying Several days Several days Not at all   Worrying too much about different things Several days Several days Not at all   Trouble relaxing More than half the days Several days Several days   Being so restless that it is hard to sit still Several days Several days Not at all   Becoming easily annoyed or irritable Not at all Not at all Not at all   Feeling afraid as if something awful might happen Not at all Not at all Not at all   ERNA-7 Total Score 6 5 1   How difficult have these problems made it for you to do your work, take

## 2024-01-31 ENCOUNTER — TELEMEDICINE (OUTPATIENT)
Dept: BEHAVIORAL/MENTAL HEALTH CLINIC | Age: 34
End: 2024-01-31
Payer: COMMERCIAL

## 2024-01-31 ENCOUNTER — TELEPHONE (OUTPATIENT)
Dept: PRIMARY CARE CLINIC | Facility: CLINIC | Age: 34
End: 2024-01-31

## 2024-01-31 DIAGNOSIS — Z65.8 PSYCHOSOCIAL STRESSORS: ICD-10-CM

## 2024-01-31 DIAGNOSIS — F33.41 RECURRENT MAJOR DEPRESSIVE DISORDER, IN PARTIAL REMISSION (HCC): ICD-10-CM

## 2024-01-31 DIAGNOSIS — R00.2 INTERMITTENT PALPITATIONS: ICD-10-CM

## 2024-01-31 DIAGNOSIS — F50.81 BINGE EATING DISORDER: ICD-10-CM

## 2024-01-31 DIAGNOSIS — F98.8 ATTENTION DEFICIT DISORDER, UNSPECIFIED HYPERACTIVITY PRESENCE: ICD-10-CM

## 2024-01-31 DIAGNOSIS — T88.7XXA MEDICATION SIDE EFFECT: Primary | ICD-10-CM

## 2024-01-31 DIAGNOSIS — F51.02 ADJUSTMENT INSOMNIA: ICD-10-CM

## 2024-01-31 DIAGNOSIS — F41.1 GENERALIZED ANXIETY DISORDER: ICD-10-CM

## 2024-01-31 PROCEDURE — 99213 OFFICE O/P EST LOW 20 MIN: CPT | Performed by: PSYCHIATRY & NEUROLOGY

## 2024-01-31 ASSESSMENT — PATIENT HEALTH QUESTIONNAIRE - PHQ9
3. TROUBLE FALLING OR STAYING ASLEEP: 1
SUM OF ALL RESPONSES TO PHQ QUESTIONS 1-9: 4
SUM OF ALL RESPONSES TO PHQ9 QUESTIONS 1 & 2: 0
1. LITTLE INTEREST OR PLEASURE IN DOING THINGS: 0
SUM OF ALL RESPONSES TO PHQ QUESTIONS 1-9: 4
8. MOVING OR SPEAKING SO SLOWLY THAT OTHER PEOPLE COULD HAVE NOTICED. OR THE OPPOSITE, BEING SO FIGETY OR RESTLESS THAT YOU HAVE BEEN MOVING AROUND A LOT MORE THAN USUAL: 0
6. FEELING BAD ABOUT YOURSELF - OR THAT YOU ARE A FAILURE OR HAVE LET YOURSELF OR YOUR FAMILY DOWN: 1
5. POOR APPETITE OR OVEREATING: 0
9. THOUGHTS THAT YOU WOULD BE BETTER OFF DEAD, OR OF HURTING YOURSELF: 0
10. IF YOU CHECKED OFF ANY PROBLEMS, HOW DIFFICULT HAVE THESE PROBLEMS MADE IT FOR YOU TO DO YOUR WORK, TAKE CARE OF THINGS AT HOME, OR GET ALONG WITH OTHER PEOPLE: 2
4. FEELING TIRED OR HAVING LITTLE ENERGY: 1
7. TROUBLE CONCENTRATING ON THINGS, SUCH AS READING THE NEWSPAPER OR WATCHING TELEVISION: 1
2. FEELING DOWN, DEPRESSED OR HOPELESS: 0

## 2024-01-31 ASSESSMENT — ANXIETY QUESTIONNAIRES
IF YOU CHECKED OFF ANY PROBLEMS ON THIS QUESTIONNAIRE, HOW DIFFICULT HAVE THESE PROBLEMS MADE IT FOR YOU TO DO YOUR WORK, TAKE CARE OF THINGS AT HOME, OR GET ALONG WITH OTHER PEOPLE: SOMEWHAT DIFFICULT
6. BECOMING EASILY ANNOYED OR IRRITABLE: 0
4. TROUBLE RELAXING: 2
GAD7 TOTAL SCORE: 6
2. NOT BEING ABLE TO STOP OR CONTROL WORRYING: 1
3. WORRYING TOO MUCH ABOUT DIFFERENT THINGS: 1
5. BEING SO RESTLESS THAT IT IS HARD TO SIT STILL: 1
1. FEELING NERVOUS, ANXIOUS, OR ON EDGE: 1
7. FEELING AFRAID AS IF SOMETHING AWFUL MIGHT HAPPEN: 0

## 2024-01-31 NOTE — TELEPHONE ENCOUNTER
Pt heart rate still too elizabeth after yesterdays visit, pt believe is from her medication prescribe from Dr Borges, please call pt back, thank you.

## 2024-01-31 NOTE — TELEPHONE ENCOUNTER
Patient was in office yesterday and had taken her vyvanse at around 11 and when she came to office for visit her heart rate was 120-130. Stated that last night she went to urgent care because the nurse line told her to go in because her heart rate was still 120-130. They advised that they feel it is Vyvanse related and she did end up going to ER. They did EKG and everything looked good but her heart rate was still 120 at that time. While waiting on all the blood test results to come back it finally dropped back to 80. Stated that both the urgent care and ER did EKG.     Today she took her medication at 10:25 and at 1:10 heart rate was 125 and then again at 1:45 heart rate was 120. It is now 2:36 and it is still 117.     Patient stated that she is not having any other symptoms other than elevated heart rate and can feel it is faster. Stated that she was told that it could have been because her body is getting used to the medication again because she had been out of it for a week. Wants to make sure if she should continue it or how she should proceed. Stated that she has not been doing any activity and has only been sitting at her desk, has not taken Xanax in over 48 hours. Has not had any caffeine. Would like to know the best course of action as she did not want to just stop without guidance from Dr Borges if she should.

## 2024-01-31 NOTE — PROGRESS NOTES
Patient:  Sarah Carter  Age:  33 y.o.  :  1990     SEX:  female MRN:  [id number]     RACE: White (non-)     SEEN:  [x]  PATIENT  []  SPOUSE []  OTHER:                  2024     2:50 PM 2024     2:52 PM 10/31/2023    10:29 AM   PHQ-9    Little interest or pleasure in doing things 0 0 0   Feeling down, depressed, or hopeless 0 0 1   Trouble falling or staying asleep, or sleeping too much 1 2 1   Feeling tired or having little energy 1 2 0   Poor appetite or overeating 0 0 0   Feeling bad about yourself - or that you are a failure or have let yourself or your family down 1 1 1   Trouble concentrating on things, such as reading the newspaper or watching television 1 1 0   Moving or speaking so slowly that other people could have noticed. Or the opposite - being so fidgety or restless that you have been moving around a lot more than usual 0 0 0   Thoughts that you would be better off dead, or of hurting yourself in some way 0 0 0   PHQ-2 Score 0 0 1   PHQ-9 Total Score 4 6 3   If you checked off any problems, how difficult have these problems made it for you to do your work, take care of things at home, or get along with other people? 2 2 1           2024     2:52 PM 2024     2:56 PM 10/31/2023    10:30 AM   ERNA-7 SCREENING   Feeling nervous, anxious, or on edge Several days Several days Several days   Not being able to stop or control worrying Several days Several days Several days   Worrying too much about different things Several days Several days Several days   Trouble relaxing More than half the days More than half the days Several days   Being so restless that it is hard to sit still Several days Several days Several days   Becoming easily annoyed or irritable Not at all Not at all Not at all   Feeling afraid as if something awful might happen Not at all Not at all Not at all   ERNA-7 Total Score 6 6 5   How difficult have these problems made it for you to

## 2024-02-07 ENCOUNTER — OFFICE VISIT (OUTPATIENT)
Dept: BEHAVIORAL/MENTAL HEALTH CLINIC | Facility: CLINIC | Age: 34
End: 2024-02-07
Payer: COMMERCIAL

## 2024-02-07 DIAGNOSIS — F50.81 BINGE EATING DISORDER: ICD-10-CM

## 2024-02-07 DIAGNOSIS — F33.41 RECURRENT MAJOR DEPRESSIVE DISORDER, IN PARTIAL REMISSION (HCC): Primary | ICD-10-CM

## 2024-02-07 DIAGNOSIS — F98.8 ATTENTION DEFICIT DISORDER, UNSPECIFIED HYPERACTIVITY PRESENCE: ICD-10-CM

## 2024-02-07 DIAGNOSIS — F41.1 GENERALIZED ANXIETY DISORDER: ICD-10-CM

## 2024-02-07 PROCEDURE — 90837 PSYTX W PT 60 MINUTES: CPT | Performed by: SOCIAL WORKER

## 2024-02-07 PROCEDURE — 1036F TOBACCO NON-USER: CPT | Performed by: SOCIAL WORKER
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
## 2024-02-21 ENCOUNTER — OFFICE VISIT (OUTPATIENT)
Dept: BEHAVIORAL/MENTAL HEALTH CLINIC | Facility: CLINIC | Age: 34
End: 2024-02-21
Payer: COMMERCIAL

## 2024-02-21 DIAGNOSIS — F33.41 RECURRENT MAJOR DEPRESSIVE DISORDER, IN PARTIAL REMISSION (HCC): Primary | ICD-10-CM

## 2024-02-21 DIAGNOSIS — F98.8 ATTENTION DEFICIT DISORDER, UNSPECIFIED HYPERACTIVITY PRESENCE: ICD-10-CM

## 2024-02-21 DIAGNOSIS — F41.1 GENERALIZED ANXIETY DISORDER: ICD-10-CM

## 2024-02-21 DIAGNOSIS — F50.81 BINGE EATING DISORDER: ICD-10-CM

## 2024-02-21 PROCEDURE — 90837 PSYTX W PT 60 MINUTES: CPT | Performed by: SOCIAL WORKER

## 2024-02-21 NOTE — PROGRESS NOTES
INDIVIDUAL THERAPY NOTE  NAME: Sarah Carter    DATE: 2/21/2024    TYPE OF SERVICE: Individual Therapy    LOCATION OF SERVICE: [facility]    DURATION: 60 mins    DIAGNOSIS: :    1. Recurrent major depressive disorder, in partial remission (HCC)    2. Generalized anxiety disorder    3. Attention deficit disorder, unspecified hyperactivity presence    4. Binge eating disorder        CHIEF COMPLAINT: depression and anxiety    MENTAL STATUS EXAM:  Pt was appropriately dressed and groomed.  Pt was 0x4. No unusual mannerisms were noted.  No psychotic symptoms displayed by pt.  Pt was cooperative and engaged in the session.  Insight and judgment were intact.  Denied suicidal or homicidal ideation.  Fund of knowledge and attention span are WNL.  Denies developmental or language difficulties.  Mood/Affect: mildly depressed and anxious  Thought Process: coherent    PLAN: CBT, DBT, CPT, Humanistic/Client Centered, ACT, Seeking Safety, Psychodynamic, ERP may be used to address goals.    Summary of Service:  This SW met with th ept face to face in the office.  The pt states she has been okay.  Her son continues to have some medical issues but they are being managed.  She has been getting along okay with her .  She has not returned to the office yet for work.  She and her family will visit her in laws in March.  These trips can be stressful for her and support was provided.  The pt denies episodes of binge eating.  The issue of food and meal preparation does remain an issue.  The pt tends to procrastinate or avoid food prep, and then she is left scrambling at dinner time.  Her children are picky eaters but she wants the family to eat the same meals.  Asking her  for help was discussed, taking short cuts, like ordering groceries was discussed.  Being forgiving of oneself, making small changes, and mindful eating was discussed.    Follow Up: 2 weeks

## 2024-03-07 ENCOUNTER — INITIAL CONSULT (OUTPATIENT)
Age: 34
End: 2024-03-07
Payer: COMMERCIAL

## 2024-03-07 VITALS
SYSTOLIC BLOOD PRESSURE: 130 MMHG | HEART RATE: 78 BPM | WEIGHT: 202.6 LBS | HEIGHT: 66 IN | DIASTOLIC BLOOD PRESSURE: 80 MMHG | BODY MASS INDEX: 32.56 KG/M2

## 2024-03-07 DIAGNOSIS — R00.2 PALPITATIONS: Primary | ICD-10-CM

## 2024-03-07 PROCEDURE — 99244 OFF/OP CNSLTJ NEW/EST MOD 40: CPT | Performed by: INTERNAL MEDICINE

## 2024-03-07 PROCEDURE — 93000 ELECTROCARDIOGRAM COMPLETE: CPT | Performed by: INTERNAL MEDICINE

## 2024-03-07 RX ORDER — AZITHROMYCIN 250 MG/1
250 TABLET, FILM COATED ORAL DAILY
COMMUNITY
Start: 2024-03-06

## 2024-03-07 ASSESSMENT — ENCOUNTER SYMPTOMS
COUGH: 0
STRIDOR: 0
NAIL CHANGES: 0
APHONIA: 0
EYE PAIN: 0
ABDOMINAL PAIN: 0

## 2024-03-07 NOTE — PROGRESS NOTES
[address] [facility], [place] [address]  PHONE: [phone]    SUBJECTIVE:   Sarah Carter is a 33 y.o. female 1990   seen for a consultation visit regarding the following:     Chief Complaint   Patient presents with    Consultation     Intermittent palpitations              Consultation is requested by Emma Quintanilla DO for evaluation of Consultation (Intermittent palpitations)   .    History of present illness: 33 y.o. female   presents for intermittent palpitations history of tachycardia.  Patient previously on Vyvanse therapy.  Prescribed elevated heart rates has been seen in [facility].  No family history of sudden cardiac death. Went on therapy in  was on 30 mg daily and increased to 70 mg daily.     Cardiac History:  3/7/2024 EKG sinus rhythm, normal rate, normal MO intervals, ST wave normal, normal axis,    Assessment:  Tachycardia/palpitations cardiac monitor to be placed.  Discussed that elevated heart rates may be exacerbated by use of Vyvanse. she is off therapy now.    Symptomatology does not appear consistent with autonomic dysfunction  Labs reviewed from 2024 TSH within normal limits  Discussed  wearable cardiac monitoring device.Additionally, patient given instructions on transmitting data for review through the [address] [facility].     The ASCVD Risk score (Pho[place] DK, et al., 2019) failed to calculate for the following reasons:    The 2019 ASCVD risk score is only valid for ages 40 to 79      Past Medical History, Past Surgical History, Family history, Social History, and Medications were all reviewed with the patient today and updated as necessary.       Allergies   Allergen Reactions    Amoxicillin Hives    Sulfa Antibiotics Hives     Past Medical History:   Diagnosis Date    Abdominal wall hernia 2022     Umbilical Hernia Repair with mesh Dr. Vasquez 3/17/22.     Abnormal Papanicolaou smear of cervix     treated with leep.

## 2024-04-09 ENCOUNTER — OFFICE VISIT (OUTPATIENT)
Dept: BEHAVIORAL/MENTAL HEALTH CLINIC | Age: 34
End: 2024-04-09
Payer: COMMERCIAL

## 2024-04-09 VITALS
BODY MASS INDEX: 34.32 KG/M2 | HEIGHT: 65 IN | SYSTOLIC BLOOD PRESSURE: 114 MMHG | DIASTOLIC BLOOD PRESSURE: 63 MMHG | TEMPERATURE: 98.2 F | HEART RATE: 78 BPM | OXYGEN SATURATION: 97 % | WEIGHT: 206 LBS

## 2024-04-09 DIAGNOSIS — F33.42 RECURRENT MAJOR DEPRESSIVE DISORDER, IN FULL REMISSION (HCC): Primary | ICD-10-CM

## 2024-04-09 DIAGNOSIS — F50.81 BINGE EATING DISORDER: ICD-10-CM

## 2024-04-09 DIAGNOSIS — F98.8 ATTENTION DEFICIT DISORDER, UNSPECIFIED HYPERACTIVITY PRESENCE: ICD-10-CM

## 2024-04-09 DIAGNOSIS — F51.02 ADJUSTMENT INSOMNIA: ICD-10-CM

## 2024-04-09 DIAGNOSIS — F41.1 GENERALIZED ANXIETY DISORDER: ICD-10-CM

## 2024-04-09 PROCEDURE — G8417 CALC BMI ABV UP PARAM F/U: HCPCS | Performed by: PSYCHIATRY & NEUROLOGY

## 2024-04-09 PROCEDURE — 1036F TOBACCO NON-USER: CPT | Performed by: PSYCHIATRY & NEUROLOGY

## 2024-04-09 PROCEDURE — 99214 OFFICE O/P EST MOD 30 MIN: CPT | Performed by: PSYCHIATRY & NEUROLOGY

## 2024-04-09 PROCEDURE — G8427 DOCREV CUR MEDS BY ELIG CLIN: HCPCS | Performed by: PSYCHIATRY & NEUROLOGY

## 2024-04-09 RX ORDER — DULOXETIN HYDROCHLORIDE 60 MG/1
60 CAPSULE, DELAYED RELEASE ORAL DAILY
Qty: 90 CAPSULE | Refills: 1 | Status: SHIPPED | OUTPATIENT
Start: 2024-04-09

## 2024-04-09 RX ORDER — LAMOTRIGINE 100 MG/1
100 TABLET ORAL DAILY
Qty: 90 TABLET | Refills: 1 | Status: SHIPPED | OUTPATIENT
Start: 2024-04-09

## 2024-04-09 RX ORDER — ALPRAZOLAM 0.5 MG/1
0.5 TABLET ORAL 2 TIMES DAILY PRN
Qty: 60 TABLET | Refills: 3 | Status: SHIPPED | OUTPATIENT
Start: 2024-04-09 | End: 2024-08-07

## 2024-04-09 ASSESSMENT — PATIENT HEALTH QUESTIONNAIRE - PHQ9
7. TROUBLE CONCENTRATING ON THINGS, SUCH AS READING THE NEWSPAPER OR WATCHING TELEVISION: NOT AT ALL
10. IF YOU CHECKED OFF ANY PROBLEMS, HOW DIFFICULT HAVE THESE PROBLEMS MADE IT FOR YOU TO DO YOUR WORK, TAKE CARE OF THINGS AT HOME, OR GET ALONG WITH OTHER PEOPLE: SOMEWHAT DIFFICULT
2. FEELING DOWN, DEPRESSED OR HOPELESS: NOT AT ALL
4. FEELING TIRED OR HAVING LITTLE ENERGY: SEVERAL DAYS
SUM OF ALL RESPONSES TO PHQ9 QUESTIONS 1 & 2: 0
SUM OF ALL RESPONSES TO PHQ QUESTIONS 1-9: 2
9. THOUGHTS THAT YOU WOULD BE BETTER OFF DEAD, OR OF HURTING YOURSELF: NOT AT ALL
SUM OF ALL RESPONSES TO PHQ QUESTIONS 1-9: 2
5. POOR APPETITE OR OVEREATING: NOT AT ALL
SUM OF ALL RESPONSES TO PHQ QUESTIONS 1-9: 2
8. MOVING OR SPEAKING SO SLOWLY THAT OTHER PEOPLE COULD HAVE NOTICED. OR THE OPPOSITE, BEING SO FIGETY OR RESTLESS THAT YOU HAVE BEEN MOVING AROUND A LOT MORE THAN USUAL: NOT AT ALL
SUM OF ALL RESPONSES TO PHQ QUESTIONS 1-9: 2
6. FEELING BAD ABOUT YOURSELF - OR THAT YOU ARE A FAILURE OR HAVE LET YOURSELF OR YOUR FAMILY DOWN: NOT AT ALL
3. TROUBLE FALLING OR STAYING ASLEEP: SEVERAL DAYS
1. LITTLE INTEREST OR PLEASURE IN DOING THINGS: NOT AT ALL

## 2024-04-09 ASSESSMENT — ANXIETY QUESTIONNAIRES
5. BEING SO RESTLESS THAT IT IS HARD TO SIT STILL: SEVERAL DAYS
2. NOT BEING ABLE TO STOP OR CONTROL WORRYING: NOT AT ALL
4. TROUBLE RELAXING: SEVERAL DAYS
IF YOU CHECKED OFF ANY PROBLEMS ON THIS QUESTIONNAIRE, HOW DIFFICULT HAVE THESE PROBLEMS MADE IT FOR YOU TO DO YOUR WORK, TAKE CARE OF THINGS AT HOME, OR GET ALONG WITH OTHER PEOPLE: SOMEWHAT DIFFICULT
1. FEELING NERVOUS, ANXIOUS, OR ON EDGE: NOT AT ALL
7. FEELING AFRAID AS IF SOMETHING AWFUL MIGHT HAPPEN: NOT AT ALL
6. BECOMING EASILY ANNOYED OR IRRITABLE: NOT AT ALL
3. WORRYING TOO MUCH ABOUT DIFFERENT THINGS: NOT AT ALL
GAD7 TOTAL SCORE: 2

## 2024-04-09 NOTE — PROGRESS NOTES
Patient:  Sarah Carter  Age:  33 y.o.  :  1990     SEX:  female MRN:  [id number]     RACE: White (non-)     SEEN:  [x]  PATIENT  []  SPOUSE []  OTHER:                  2024     2:48 PM 2024     2:50 PM 2024     2:52 PM   PHQ-9    Little interest or pleasure in doing things 0 0 0   Feeling down, depressed, or hopeless 0 0 0   Trouble falling or staying asleep, or sleeping too much 1 1 2   Feeling tired or having little energy 1 1 2   Poor appetite or overeating 0 0 0   Feeling bad about yourself - or that you are a failure or have let yourself or your family down 0 1 1   Trouble concentrating on things, such as reading the newspaper or watching television 0 1 1   Moving or speaking so slowly that other people could have noticed. Or the opposite - being so fidgety or restless that you have been moving around a lot more than usual 0 0 0   Thoughts that you would be better off dead, or of hurting yourself in some way 0 0 0   PHQ-2 Score 0 0 0   PHQ-9 Total Score 2 4 6   If you checked off any problems, how difficult have these problems made it for you to do your work, take care of things at home, or get along with other people? 1 2 2           2024     2:48 PM 2024     2:52 PM 2024     2:56 PM   ERNA-7 SCREENING   Feeling nervous, anxious, or on edge Not at all Several days Several days   Not being able to stop or control worrying Not at all Several days Several days   Worrying too much about different things Not at all Several days Several days   Trouble relaxing Several days More than half the days More than half the days   Being so restless that it is hard to sit still Several days Several days Several days   Becoming easily annoyed or irritable Not at all Not at all Not at all   Feeling afraid as if something awful might happen Not at all Not at all Not at all   ERNA-7 Total Score 2 6 6   How difficult have these problems made it for you to do your

## 2024-04-17 ENCOUNTER — OFFICE VISIT (OUTPATIENT)
Dept: BEHAVIORAL/MENTAL HEALTH CLINIC | Facility: CLINIC | Age: 34
End: 2024-04-17
Payer: COMMERCIAL

## 2024-04-17 DIAGNOSIS — F41.1 GENERALIZED ANXIETY DISORDER: Primary | ICD-10-CM

## 2024-04-17 DIAGNOSIS — F50.81 BINGE EATING DISORDER: ICD-10-CM

## 2024-04-17 DIAGNOSIS — F98.8 ATTENTION DEFICIT DISORDER, UNSPECIFIED HYPERACTIVITY PRESENCE: ICD-10-CM

## 2024-04-17 DIAGNOSIS — F33.42 RECURRENT MAJOR DEPRESSIVE DISORDER, IN FULL REMISSION (HCC): ICD-10-CM

## 2024-04-17 PROCEDURE — 90837 PSYTX W PT 60 MINUTES: CPT | Performed by: SOCIAL WORKER

## 2024-04-17 PROCEDURE — 1036F TOBACCO NON-USER: CPT | Performed by: SOCIAL WORKER

## 2024-04-17 NOTE — PSYCHOTHERAPY
INDIVIDUAL THERAPY NOTE  NAME: Sarah Carter    DATE: 4/17/2024    TYPE OF SERVICE: Individual Therapy    LOCATION OF SERVICE: [facility]    DIAGNOSIS: :    1. Generalized anxiety disorder    2. Recurrent major depressive disorder, in full remission (HCC)    3. Attention deficit disorder, unspecified hyperactivity presence    4. Binge eating disorder        Patient Update: Pt's kids are well.  She and her spouse are getting along well.  Her dissatisfaction with her job has become stronger.    Concerns/Issues:Unhappiness in job, lack of time for self, past marital issues    Content of Session/Techniques Utilized: Processed job dissatisfaction    HW Given: none    Additional Notes:    Follow Up: 2 weeks

## 2024-04-17 NOTE — PROGRESS NOTES
INDIVIDUAL THERAPY NOTE  NAME: Sarah Carter    DATE: 4/17/2024    TYPE OF SERVICE: Individual Therapy    LOCATION OF SERVICE: [facility]    DURATION:55 mins    DIAGNOSIS: :    1. Generalized anxiety disorder    2. Recurrent major depressive disorder, in full remission (HCC)    3. Attention deficit disorder, unspecified hyperactivity presence    4. Binge eating disorder        CHIEF COMPLAINT: depression and anxiety    MENTAL STATUS EXAM:  Pt was appropriately dressed and groomed.  Pt was 0x4. No unusual mannerisms were noted.  No psychotic symptoms displayed by pt.  Pt was cooperative and engaged in the session.  Insight and judgment were intact.  Denied suicidal or homicidal ideation.  Fund of knowledge and attention span are WNL.  Denies developmental or language difficulties.  Mood/Affect: depressed with congruent affect  Thought Process: coherent    PLAN: CBT, DBT, CPT, Humanistic/Client Centered, ACT, Seeking Safety, Psychodynamic, ERP, IFS, and Poly Vagal Theory may be used to address goals.    Summary of Service:  This SW met with the pt face to face in the office.  The pt updated this SW on her mental status.  Goals continued to be addressed using various therapeutic models listed above.  HW is given as deemed appropriate.  Guided questions were asked, support and active listening were provided, distorted thinking was challenged, and strengths were validated and reinforced.  Motivational interviewing questions were asked to promote change.  Other therapeutic techniques were also utilized based upon the pt's needs and issues.    Goals Addressed:  []Trauma  []Grief   []Anxiety/Panic Attacks [x]Depression    [x]Relational Issues [x]Self Esteem  []Anger Management  [x]Other       New Symptoms:   []No  []Yes:  Progress Status:  []Improving []Declining [x]Stabilizing []Static  Change in Goals:  [x]No  []Yes    Follow Up: 2 weeks        4/9/2024     2:48 PM 1/31/2024     2:50 PM 1/30/2024

## 2024-05-03 ENCOUNTER — OFFICE VISIT (OUTPATIENT)
Dept: BEHAVIORAL/MENTAL HEALTH CLINIC | Facility: CLINIC | Age: 34
End: 2024-05-03
Payer: COMMERCIAL

## 2024-05-03 DIAGNOSIS — F98.8 ATTENTION DEFICIT DISORDER, UNSPECIFIED HYPERACTIVITY PRESENCE: ICD-10-CM

## 2024-05-03 DIAGNOSIS — F41.1 GENERALIZED ANXIETY DISORDER: Primary | ICD-10-CM

## 2024-05-03 DIAGNOSIS — F33.42 RECURRENT MAJOR DEPRESSIVE DISORDER, IN FULL REMISSION (HCC): ICD-10-CM

## 2024-05-03 PROCEDURE — 90837 PSYTX W PT 60 MINUTES: CPT | Performed by: SOCIAL WORKER

## 2024-05-03 NOTE — PROGRESS NOTES
INDIVIDUAL THERAPY NOTE  NAME: Sarah Carter    DATE: 5/3/2024    TYPE OF SERVICE: Individual Therapy    LOCATION OF SERVICE: [facility]    DURATION:55 mins    DIAGNOSIS: :    1. Generalized anxiety disorder    2. Recurrent major depressive disorder, in full remission (HCC)    3. Attention deficit disorder, unspecified hyperactivity presence      CHIEF COMPLAINT: Symptoms related to depression, anxiety, and ADHD.    Mental Status Exam:  Pt was appropriately dressed and groomed.  Pt was 0x4. No unusual mannerisms were noted.  No psychotic symptoms displayed by pt.  Pt was cooperative and engaged in the session.  Insight and judgment were intact.  Denied suicidal or homicidal ideation.  Fund of knowledge and attention span are WNL.  Denies developmental or language difficulties.  Mood/Affect: sad and anxious with congruent affect  Thought Process: linear and coherent    Plan: CBT, DBT, CPT, Humanistic/Client Centered, ACT, Seeking Safety, Psychodynamic, ERP, IFS, and Poly Vagal Theory may be used to address goals.    Summary of Service:  This SW met with the pt face to face in the office.  The pt updated this SW on her mental status.  Goals continued to be addressed using various therapeutic models listed above.  HW is given as deemed appropriate.  Guided questions were asked, support and active listening were provided, distorted thinking was challenged, and strengths were validated and reinforced.  Motivational interviewing questions were asked to promote change.  Other therapeutic techniques were also utilized based upon the pt's needs and issues.    Update:The pt does not enjoy her job.  She gets little support and she does feel she has job security.  This has greatly increased her depressive and anxiety symptoms.  Guided questions were asked and support given.  Self defeating thoughts were challenged.    Goals Addressed:  []Trauma  []Grief   [x]Anxiety/Panic

## 2024-05-16 ENCOUNTER — OFFICE VISIT (OUTPATIENT)
Dept: BEHAVIORAL/MENTAL HEALTH CLINIC | Facility: CLINIC | Age: 34
End: 2024-05-16
Payer: COMMERCIAL

## 2024-05-16 DIAGNOSIS — F98.8 ATTENTION DEFICIT DISORDER, UNSPECIFIED HYPERACTIVITY PRESENCE: ICD-10-CM

## 2024-05-16 DIAGNOSIS — F33.42 RECURRENT MAJOR DEPRESSIVE DISORDER, IN FULL REMISSION (HCC): ICD-10-CM

## 2024-05-16 DIAGNOSIS — F41.1 GENERALIZED ANXIETY DISORDER: Primary | ICD-10-CM

## 2024-05-16 PROCEDURE — 1036F TOBACCO NON-USER: CPT | Performed by: SOCIAL WORKER

## 2024-05-16 PROCEDURE — 90837 PSYTX W PT 60 MINUTES: CPT | Performed by: SOCIAL WORKER

## 2024-05-16 NOTE — PROGRESS NOTES
binges.  Ways to cope with out food were discussed.  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    Goals Addressed:  []Trauma  []Grief  [x]Anxiety/Panic Attacks  [x]Depression    []Relational Issues [x]Self Esteem  []Anger Management  []Other       New Symptoms:   [x]No  []Yes:  Progress Status:  [x]Improving []Declining []Stabilizing []Static  Change in Goals:  [x]No  []Yes    Follow Up: 2 weeks        4/9/2024     2:48 PM 1/31/2024     2:50 PM 1/30/2024     2:52 PM   PHQ-9    Little interest or pleasure in doing things 0 0 0   Feeling down, depressed, or hopeless 0 0 0   Trouble falling or staying asleep, or sleeping too much 1 1 2   Feeling tired or having little energy 1 1 2   Poor appetite or overeating 0 0 0   Feeling bad about yourself - or that you are a failure or have let yourself or your family down 0 1 1   Trouble concentrating on things, such as reading the newspaper or watching television 0 1 1   Moving or speaking so slowly that other people could have noticed. Or the opposite - being so fidgety or restless that you have been moving around a lot more than usual 0 0 0   Thoughts that you would be better off dead, or of hurting yourself in some way 0 0 0   PHQ-2 Score 0 0 0   PHQ-9 Total Score 2 4 6   If you checked off any problems, how difficult have these problems made it for you to do your work, take care of things at home, or get along with other people? 1 2 2

## 2024-05-30 ENCOUNTER — OFFICE VISIT (OUTPATIENT)
Dept: BEHAVIORAL/MENTAL HEALTH CLINIC | Facility: CLINIC | Age: 34
End: 2024-05-30
Payer: COMMERCIAL

## 2024-05-30 DIAGNOSIS — F41.1 GENERALIZED ANXIETY DISORDER: ICD-10-CM

## 2024-05-30 DIAGNOSIS — F98.8 ATTENTION DEFICIT DISORDER, UNSPECIFIED HYPERACTIVITY PRESENCE: Primary | ICD-10-CM

## 2024-05-30 DIAGNOSIS — F33.0 MILD EPISODE OF RECURRENT MAJOR DEPRESSIVE DISORDER (HCC): ICD-10-CM

## 2024-05-30 PROCEDURE — 1036F TOBACCO NON-USER: CPT | Performed by: SOCIAL WORKER

## 2024-05-30 PROCEDURE — 90837 PSYTX W PT 60 MINUTES: CPT | Performed by: SOCIAL WORKER

## 2024-05-30 NOTE — PROGRESS NOTES
INDIVIDUAL THERAPY NOTE  NAME: Sarah Carter    DATE: 5/30/2024    TYPE OF SERVICE: Individual Therapy    LOCATION OF SERVICE: [facility]    DURATION: 55 mins    DIAGNOSIS: :    1. Attention deficit disorder, unspecified hyperactivity presence    2. Generalized anxiety disorder    3. Mild episode of recurrent major depressive disorder (HCC)        CHIEF COMPLAINT: depression and anxiety      Mental Status Exam:  Pt was appropriately dressed and groomed.  Pt was 0x4. No unusual mannerisms were noted.  No psychotic symptoms displayed by pt.  Pt was cooperative and engaged in the session.  Insight and judgment were intact.  Denied suicidal or homicidal ideation.  Fund of knowledge and attention span are WNL.  Denies developmental or language difficulties.  Mood/Affect: mildly depressed with congruent affect  Thought Process: linear and coherent    Plan: CBT, DBT, CPT, Humanistic/Client Centered, ACT, Seeking Safety, Psychodynamic, ERP, IFS, and Poly Vagal Theory may be used to address goals.    Summary of Service:  This SW met with the pt face to face in the office.  The pt updated this SW on her mental status.  Goals continued to be addressed using various therapeutic models listed above.  HW is given as deemed appropriate.  Guided questions were asked, support and active listening were provided, distorted thinking was challenged, and strengths were validated and reinforced.  Motivational interviewing questions were asked to promote change.  Other therapeutic techniques were also utilized based upon the pt's needs and issues.    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  Relationship with spouse and kids is good.  Work has stabilized but she does not enjoy her job.  The pt endorses she does not take time to adequately self care.  Pt is not binge eating but she does not have a healthy relationship with food.  Atuned eating was discussed.This was processed.  Pt given Pie Chart

## 2024-06-25 ENCOUNTER — TELEMEDICINE (OUTPATIENT)
Dept: BEHAVIORAL/MENTAL HEALTH CLINIC | Age: 34
End: 2024-06-25
Payer: COMMERCIAL

## 2024-06-25 DIAGNOSIS — F51.02 ADJUSTMENT INSOMNIA: ICD-10-CM

## 2024-06-25 DIAGNOSIS — F98.8 ATTENTION DEFICIT DISORDER, UNSPECIFIED HYPERACTIVITY PRESENCE: ICD-10-CM

## 2024-06-25 DIAGNOSIS — F41.1 GENERALIZED ANXIETY DISORDER: ICD-10-CM

## 2024-06-25 DIAGNOSIS — F33.42 RECURRENT MAJOR DEPRESSIVE DISORDER, IN FULL REMISSION (HCC): Primary | ICD-10-CM

## 2024-06-25 DIAGNOSIS — F50.81 BINGE EATING DISORDER: ICD-10-CM

## 2024-06-25 PROCEDURE — G8427 DOCREV CUR MEDS BY ELIG CLIN: HCPCS | Performed by: PSYCHIATRY & NEUROLOGY

## 2024-06-25 PROCEDURE — 99214 OFFICE O/P EST MOD 30 MIN: CPT | Performed by: PSYCHIATRY & NEUROLOGY

## 2024-06-25 RX ORDER — ALPRAZOLAM 0.5 MG/1
0.5 TABLET ORAL 2 TIMES DAILY PRN
Qty: 60 TABLET | Refills: 3 | Status: SHIPPED | OUTPATIENT
Start: 2024-06-25 | End: 2024-10-23

## 2024-06-25 RX ORDER — LISDEXAMFETAMINE DIMESYLATE 70 MG/1
70 CAPSULE ORAL EVERY MORNING
Qty: 30 CAPSULE | Refills: 0 | Status: CANCELLED | OUTPATIENT
Start: 2024-06-25 | End: 2024-07-25

## 2024-06-25 RX ORDER — LEVOMEFOLATE CALCIUM 15 MG
15 TABLET ORAL DAILY
Qty: 30 TABLET | Refills: 3 | Status: SHIPPED | OUTPATIENT
Start: 2024-06-25

## 2024-06-25 RX ORDER — LISDEXAMFETAMINE DIMESYLATE 30 MG/1
30 CAPSULE ORAL DAILY
Qty: 30 CAPSULE | Refills: 0 | Status: SHIPPED | OUTPATIENT
Start: 2024-06-25 | End: 2024-07-25

## 2024-06-25 RX ORDER — FLUTICASONE PROPIONATE 50 MCG
2 SPRAY, SUSPENSION (ML) NASAL DAILY
COMMUNITY
Start: 2024-03-06

## 2024-06-25 RX ORDER — MONTELUKAST SODIUM 10 MG/1
10 TABLET ORAL NIGHTLY
COMMUNITY
Start: 2024-06-04

## 2024-06-25 RX ORDER — DULOXETIN HYDROCHLORIDE 60 MG/1
60 CAPSULE, DELAYED RELEASE ORAL DAILY
Qty: 90 CAPSULE | Refills: 1 | Status: SHIPPED | OUTPATIENT
Start: 2024-06-25

## 2024-06-25 RX ORDER — LAMOTRIGINE 100 MG/1
100 TABLET ORAL DAILY
Qty: 90 TABLET | Refills: 1 | Status: SHIPPED | OUTPATIENT
Start: 2024-06-25

## 2024-06-25 ASSESSMENT — PATIENT HEALTH QUESTIONNAIRE - PHQ9
8. MOVING OR SPEAKING SO SLOWLY THAT OTHER PEOPLE COULD HAVE NOTICED. OR THE OPPOSITE, BEING SO FIGETY OR RESTLESS THAT YOU HAVE BEEN MOVING AROUND A LOT MORE THAN USUAL: NOT AT ALL
SUM OF ALL RESPONSES TO PHQ9 QUESTIONS 1 & 2: 0
2. FEELING DOWN, DEPRESSED OR HOPELESS: NOT AT ALL
4. FEELING TIRED OR HAVING LITTLE ENERGY: SEVERAL DAYS
SUM OF ALL RESPONSES TO PHQ QUESTIONS 1-9: 1
3. TROUBLE FALLING OR STAYING ASLEEP: NOT AT ALL
7. TROUBLE CONCENTRATING ON THINGS, SUCH AS READING THE NEWSPAPER OR WATCHING TELEVISION: NOT AT ALL
SUM OF ALL RESPONSES TO PHQ QUESTIONS 1-9: 1
9. THOUGHTS THAT YOU WOULD BE BETTER OFF DEAD, OR OF HURTING YOURSELF: NOT AT ALL
6. FEELING BAD ABOUT YOURSELF - OR THAT YOU ARE A FAILURE OR HAVE LET YOURSELF OR YOUR FAMILY DOWN: NOT AT ALL
1. LITTLE INTEREST OR PLEASURE IN DOING THINGS: NOT AT ALL
SUM OF ALL RESPONSES TO PHQ QUESTIONS 1-9: 1
SUM OF ALL RESPONSES TO PHQ QUESTIONS 1-9: 1

## 2024-06-25 ASSESSMENT — ANXIETY QUESTIONNAIRES
IF YOU CHECKED OFF ANY PROBLEMS ON THIS QUESTIONNAIRE, HOW DIFFICULT HAVE THESE PROBLEMS MADE IT FOR YOU TO DO YOUR WORK, TAKE CARE OF THINGS AT HOME, OR GET ALONG WITH OTHER PEOPLE: NOT DIFFICULT AT ALL
7. FEELING AFRAID AS IF SOMETHING AWFUL MIGHT HAPPEN: NOT AT ALL
GAD7 TOTAL SCORE: 2
4. TROUBLE RELAXING: SEVERAL DAYS
5. BEING SO RESTLESS THAT IT IS HARD TO SIT STILL: SEVERAL DAYS
2. NOT BEING ABLE TO STOP OR CONTROL WORRYING: NOT AT ALL
1. FEELING NERVOUS, ANXIOUS, OR ON EDGE: NOT AT ALL
3. WORRYING TOO MUCH ABOUT DIFFERENT THINGS: NOT AT ALL
6. BECOMING EASILY ANNOYED OR IRRITABLE: NOT AT ALL

## 2024-06-25 NOTE — PROGRESS NOTES
JUDGEMENT:   [x] GOOD   [] FAIR   [] POOR   INSIGHT:   [x] GOOD   [] FAIR   [] POOR     COGNITION:           SENSORIUM:   [x] ALERT   [] CLOUDED   [] DROWSY     ORIENTATION:   [x] INTACT   [] TIME:  PLACE  PERSON   RECENT & REMOTE MEMORY:   [x] NORMAL   [] OTHER:                  ATTENTION:   [] INTACT   [x] MILD IMPAIRMENT   [] SEVERE IMPAIRMENT     CONCENTRATION:   [] INTACT   [x] MILD IMPAIRMENT   [] SEVERE IMPAIRMENT     LANGUAGE:   [x] AVERAGE   [] ABOVE AVERAGE   [] BELOW AVERAGE     FUND OF KNOWLEDGE:   [] UNABLE TO ASSESS AT THIS TIME   [x] AVERAGE   [] ABOVE AVERAGE   [] BELOW AVERAGE      [] GOOD TO EXCELLENT KNOWLEDGE OF CURRENT EVENTS   [] POOR TO NO KNLEDGE OF CURRENT EVENTS                                          ABNORMAL MOVEMENTS:   [x] NONE   [] TICS   [] TREMORS   [] BIZZARE      [] FACE   [] TRUNK   [] EXTREMETIES   [] GESTURES        SLEEP:   [x] GOOD   [] FAIR   [] POOR      APPETITE:   [x] GOOD   [] POOR   [] ERRATIC       MUSCLE STRENGTH AND TONE   [] WNL   [] ATROPHY   [] SPASTIC        [] FLACCID   [] COGWHEEL       Diagnoses/Impressions:    ICD-10-CM    1. Recurrent major depressive disorder, in full remission (HCC)  F33.42       2. Generalized anxiety disorder  F41.1 ALPRAZolam (XANAX) 0.5 MG tablet      3. Binge eating disorder  F50.81 lisdexamfetamine (VYVANSE) 30 MG capsule      4. Attention deficit disorder, unspecified hyperactivity presence  F98.8 lisdexamfetamine (VYVANSE) 30 MG capsule      5. Adjustment insomnia  F51.02           TREATMENT GOALS:  Symptom reduction, Medication adherence, maintain therapeutic gains    LABS/IMAGING:    []  Ordered [x]  Reviewed []  New Labs Ordered:     LAB  WBC   Date/Time Value Ref Range Status   05/08/2022 07:40 AM 7.0 4.3 - 11.1 K/uL Final     Hemoglobin   Date/Time Value Ref Range Status   05/08/2022 07:40 AM 14.8 11.7 - 15.4 g/dL Final     Hematocrit   Date/Time Value Ref Range Status   05/08/2022 07:40 AM 43.3 35.8 - 46.3 % Final

## 2024-06-27 ENCOUNTER — OFFICE VISIT (OUTPATIENT)
Dept: BEHAVIORAL/MENTAL HEALTH CLINIC | Facility: CLINIC | Age: 34
End: 2024-06-27
Payer: COMMERCIAL

## 2024-06-27 DIAGNOSIS — F33.42 RECURRENT MAJOR DEPRESSIVE DISORDER, IN FULL REMISSION (HCC): ICD-10-CM

## 2024-06-27 DIAGNOSIS — F50.81 BINGE EATING DISORDER: ICD-10-CM

## 2024-06-27 DIAGNOSIS — F98.8 ATTENTION DEFICIT DISORDER, UNSPECIFIED HYPERACTIVITY PRESENCE: ICD-10-CM

## 2024-06-27 DIAGNOSIS — F41.1 GENERALIZED ANXIETY DISORDER: Primary | ICD-10-CM

## 2024-06-27 PROCEDURE — 1036F TOBACCO NON-USER: CPT | Performed by: SOCIAL WORKER

## 2024-06-27 PROCEDURE — 90837 PSYTX W PT 60 MINUTES: CPT | Performed by: SOCIAL WORKER

## 2024-06-27 NOTE — PROGRESS NOTES
INDIVIDUAL THERAPY NOTE  NAME: Sarah Carter    DATE: 6/27/2024    TYPE OF SERVICE: Individual Therapy    LOCATION OF SERVICE: [facility]    DURATION:55 mins    DIAGNOSIS: :    1. Generalized anxiety disorder    2. Binge eating disorder    3. Attention deficit disorder, unspecified hyperactivity presence    4. Recurrent major depressive disorder, in full remission (HCC)        CHIEF COMPLAINT: symptoms associated with depression, anxiety, and ADHD      Mental Status Exam:  Pt was appropriately dressed and groomed.  Pt was 0x4. No unusual mannerisms were noted.  No psychotic symptoms displayed by pt.  Pt was cooperative and engaged in the session.  Insight and judgment were intact.  Denied suicidal or homicidal ideation.  Fund of knowledge and attention span are WNL.  Denies developmental or language difficulties.  Mood/Affect:mildly depressed and anxious  Thought Process:linear and coherent    Plan: CBT, DBT, CPT, Humanistic/Client Centered, ACT, Seeking Safety, Psychodynamic, ERP, IFS, and Poly Vagal Theory may be used to address goals.    Summary of Service:  This SW met with the pt face to face in the office.  The pt updated this SW on her mental status.  Goals continued to be addressed using various therapeutic models listed above.  HW is given as deemed appropriate.  Guided questions were asked, support and active listening were provided, distorted thinking was challenged, and strengths were validated and reinforced.  Motivational interviewing questions were asked to promote change.  Other therapeutic techniques were also utilized based upon the pt's needs and issues.    Update: The pt states things have been occur.  She did the values assessment HW and where most of her time is going was discussed.  Incremental ways she could make changes to her time management aligns more with her values was discussed.  Being kind and forgiving of oneself was encouraged.  The pt continues to enjoy

## 2024-06-30 ENCOUNTER — PATIENT MESSAGE (OUTPATIENT)
Dept: BEHAVIORAL/MENTAL HEALTH CLINIC | Age: 34
End: 2024-06-30

## 2024-07-01 ENCOUNTER — TELEPHONE (OUTPATIENT)
Dept: BEHAVIORAL/MENTAL HEALTH CLINIC | Age: 34
End: 2024-07-01

## 2024-07-01 NOTE — TELEPHONE ENCOUNTER
----- Message from Sarah Carter sent at 6/30/2024 10:58 AM EDT -----  Regarding: Resting Heart Rate Change on Vyvanse  Contact: [phone]  Good Morning.  I started taking the 30mg of Vyvanse Thursday morning and have noticed my resting heart rate is between 110-120 and at some times over 120.  Prior to going back on Vyvanse my resting HR was 74.  I expected my heart rate to go up as it is a stimulant but I wanted to see if this is “normal”.  I may be overthinking due to the high HR instance that happened in January.    Thanks

## 2024-07-11 ENCOUNTER — OFFICE VISIT (OUTPATIENT)
Dept: BEHAVIORAL/MENTAL HEALTH CLINIC | Facility: CLINIC | Age: 34
End: 2024-07-11
Payer: COMMERCIAL

## 2024-07-11 DIAGNOSIS — F50.81 BINGE EATING DISORDER: ICD-10-CM

## 2024-07-11 DIAGNOSIS — F98.8 ATTENTION DEFICIT DISORDER, UNSPECIFIED HYPERACTIVITY PRESENCE: ICD-10-CM

## 2024-07-11 DIAGNOSIS — F41.1 GENERALIZED ANXIETY DISORDER: Primary | ICD-10-CM

## 2024-07-11 DIAGNOSIS — F33.0 MILD EPISODE OF RECURRENT MAJOR DEPRESSIVE DISORDER (HCC): ICD-10-CM

## 2024-07-11 PROCEDURE — 90837 PSYTX W PT 60 MINUTES: CPT | Performed by: SOCIAL WORKER

## 2024-07-11 PROCEDURE — 1036F TOBACCO NON-USER: CPT | Performed by: SOCIAL WORKER

## 2024-07-11 NOTE — PROGRESS NOTES
is in PT and she has an ortho appt.  This SW provided support and encouraged the pt to be forgiven and kind to herself.  The pt does not tolerate stimulants and non-medication ways to deal with ADHD were discussed.    Goals Addressed:  []Trauma  []Grief  [x]Anxiety/Panic Attacks  [x]Depression    []Relational Issues []Self Esteem  []Anger Management  [x]Other       New Symptoms:   [x]No  []Yes:  Progress Status:  []Improving []Declining []Stable [x]Static  Change in Goals:  [x]No  []Yes    Follow Up: 2 weeks        6/25/2024     1:54 PM 4/9/2024     2:48 PM 1/31/2024     2:50 PM   PHQ-9    Little interest or pleasure in doing things 0 0 0   Feeling down, depressed, or hopeless 0 0 0   Trouble falling or staying asleep, or sleeping too much 0 1 1   Feeling tired or having little energy 1 1 1   Poor appetite or overeating 0 0 0   Feeling bad about yourself - or that you are a failure or have let yourself or your family down 0 0 1   Trouble concentrating on things, such as reading the newspaper or watching television 0 0 1   Moving or speaking so slowly that other people could have noticed. Or the opposite - being so fidgety or restless that you have been moving around a lot more than usual 0 0 0   Thoughts that you would be better off dead, or of hurting yourself in some way 0 0 0   PHQ-2 Score 0 0 0   PHQ-9 Total Score 1 2 4   If you checked off any problems, how difficult have these problems made it for you to do your work, take care of things at home, or get along with other people?  1 2

## 2024-08-13 ENCOUNTER — OFFICE VISIT (OUTPATIENT)
Dept: BEHAVIORAL/MENTAL HEALTH CLINIC | Facility: CLINIC | Age: 34
End: 2024-08-13
Payer: COMMERCIAL

## 2024-08-13 DIAGNOSIS — F90.0 ATTENTION DEFICIT HYPERACTIVITY DISORDER (ADHD), PREDOMINANTLY INATTENTIVE TYPE: ICD-10-CM

## 2024-08-13 DIAGNOSIS — F33.0 MILD EPISODE OF RECURRENT MAJOR DEPRESSIVE DISORDER (HCC): ICD-10-CM

## 2024-08-13 DIAGNOSIS — F41.1 GENERALIZED ANXIETY DISORDER: Primary | ICD-10-CM

## 2024-08-13 PROCEDURE — 90791 PSYCH DIAGNOSTIC EVALUATION: CPT | Performed by: SOCIAL WORKER

## 2024-08-13 PROCEDURE — 1036F TOBACCO NON-USER: CPT | Performed by: SOCIAL WORKER

## 2024-08-13 NOTE — PROGRESS NOTES
INDIVIDUAL THERAPY NOTE  NAME: Sarah Carter    DATE: 8/13/2024    TYPE OF SERVICE: Individual Therapy    LOCATION OF SERVICE: [facility]    DURATION: 60 mins    DIAGNOSIS: :    1. Generalized anxiety disorder    2. Mild episode of recurrent major depressive disorder (HCC)    3. Attention deficit hyperactivity disorder (ADHD), predominantly inattentive type        New Symptoms:   [x]No  []Yes:  Progress Status:  [x]Improving []Declining []Stable []Static  Change in Goals:  [x]No  []Yes    Chief Complaint:symptoms associated with depression and anxiety    Plan: CBT, DBT, CPT, Humanistic/Client Centered, ACT, Seeking Safety, Psychodynamic, ERP, IFS, and Poly Vagal Theory may be used to address symptoms associated with pt diagnosis and goals.    Mental Status Exam:  Pt was appropriately dressed and groomed.  Pt was 0x4. No unusual mannerisms were noted.  No psychotic symptoms displayed by pt.  Pt was cooperative and engaged in the session.  Insight and judgment were intact.  Denied suicidal or homicidal ideation.  Fund of knowledge and attention span are WNL.  Denies developmental or language difficulties.  Mood/Affect:mildly depressed and anxious  Thought Process:linear and coherent    Summary of Service:  This SW met with the pt face to face in the office.  The pt updated this SW on her mental status.  Goals continued to be addressed using various therapeutic models listed above.  HW is given as deemed appropriate.  Guided questions were asked, support and active listening were provided, distorted thinking was challenged, and strengths were validated and reinforced.  Motivational interviewing questions were asked to promote change.  Other therapeutic techniques were also utilized based upon the pt's needs and issues.    Update: The pt states things have been stressful with her oldest son returning to school.  Her kids are fine overall and she has been getting along well with her .

## 2024-08-27 ENCOUNTER — TELEMEDICINE (OUTPATIENT)
Dept: BEHAVIORAL/MENTAL HEALTH CLINIC | Facility: CLINIC | Age: 34
End: 2024-08-27
Payer: COMMERCIAL

## 2024-08-27 DIAGNOSIS — F90.0 ATTENTION DEFICIT HYPERACTIVITY DISORDER (ADHD), PREDOMINANTLY INATTENTIVE TYPE: ICD-10-CM

## 2024-08-27 DIAGNOSIS — F41.1 GENERALIZED ANXIETY DISORDER: Primary | ICD-10-CM

## 2024-08-27 DIAGNOSIS — F33.0 MILD EPISODE OF RECURRENT MAJOR DEPRESSIVE DISORDER (HCC): ICD-10-CM

## 2024-08-27 PROCEDURE — 90834 PSYTX W PT 45 MINUTES: CPT | Performed by: SOCIAL WORKER

## 2024-08-27 PROCEDURE — 1036F TOBACCO NON-USER: CPT | Performed by: SOCIAL WORKER

## 2024-08-27 NOTE — PROGRESS NOTES
INDIVIDUAL THERAPY NOTE  VIRTUAL  NAME: Sarah Carter    DATE: 8/27/2024    TYPE OF SERVICE: Individual Therapy    LOCATION OF SERVICE: [facility]    DURATION:50 mins    DIAGNOSIS: :    1. Generalized anxiety disorder    2. Mild episode of recurrent major depressive disorder (HCC)    3. Attention deficit hyperactivity disorder (ADHD), predominantly inattentive type        New Symptoms:   [x]No  []Yes:  Progress Status:  [x]Improving []Declining []Stable []Static  Change in Goals:  [x]No  []Yes    Chief Complaint: symptoms associated with depression and anxiety    Plan: CBT, DBT, CPT, Humanistic/Client Centered, ACT, Seeking Safety, Psychodynamic, ERP, IFS, and Poly Vagal Theory may be used to address symptoms associated with pt diagnosis and goals.    Mental Status Exam:  Pt was appropriately dressed and groomed.  Pt was 0x4. No unusual mannerisms were noted.  No psychotic symptoms displayed by pt.  Pt was cooperative and engaged in the session.  Insight and judgment were intact.  Denied suicidal or homicidal ideation.  Fund of knowledge and attention span are WNL.  Denies developmental or language difficulties.  Mood/Affect: mildly anxious with congruent affect  Thought Process:coherent    Summary of Service:  This SW met with the pt face to face in the office.  The pt updated this SW on her mental status.  Goals continued to be addressed using various therapeutic models listed above.  HW is given as deemed appropriate.  Guided questions were asked, support and active listening were provided, distorted thinking was challenged, and strengths were validated and reinforced.  Motivational interviewing questions were asked to promote change.  Other therapeutic techniques were also utilized based upon the pt's needs and issues.    Issues Addressed:       []Trauma    []Grief    [x]Anxiety/Panic Attacks    [x]Depression    []Relational Issues   []Self Esteem    []Anger  Management    []Mood Instability  [x]Other- boundaries, self care       Follow Up: 2 weeks      6/25/2024     1:55 PM 4/9/2024     2:48 PM 1/31/2024     2:52 PM   ERNA-7 SCREENING   Feeling nervous, anxious, or on edge Not at all Not at all Several days   Not being able to stop or control worrying Not at all Not at all Several days   Worrying too much about different things Not at all Not at all Several days   Trouble relaxing Several days Several days More than half the days   Being so restless that it is hard to sit still Several days Several days Several days   Becoming easily annoyed or irritable Not at all Not at all Not at all   Feeling afraid as if something awful might happen Not at all Not at all Not at all   ERNA-7 Total Score 2 2 6   How difficult have these problems made it for you to do your work, take care of things at home, or get along with other people? Not difficult at all Somewhat difficult Somewhat difficult        TERRELL Lomax-CP     Date:  8/27/2024      Consent: Sarah Carter, who was seen by synchronous (real-time) audio-video technology, and/or her healthcare decision maker, is aware that this patient-initiated, Telehealth encounter on 8/27/2024 is a billable service, with coverage as determined by her insurance carrier. She is aware that she may receive a bill and has provided verbal consent to proceed: Yes      This virtual visit was conducted:My Chart    Total Time:  50  Sarah Carter, was evaluated through a synchronous (real-time) audio-video encounter. The patient (or guardian if applicable) is aware that this is a billable service, which includes applicable co-pays. This Virtual Visit was conducted with patient's (and/or legal guardian's) consent. Patient identification was verified, and a caregiver was present when appropriate.   The patient was located at Home: [address] [place] [phone]  Provider was located at Facility (Appt Dept): 2

## 2024-08-27 NOTE — PSYCHOTHERAPY
INDIVIDUAL THERAPY NOTE  NAME: Sarah Carter      Pt states she has been fairly well.  She has been practicing self care by eating better, going to the gym, and connecting with others at [employer].  She is somewhat nervous due to a business trip that she leaves for this evening.  She and her  will celebrate their anniversary in [place] in less than 2 weeks.  She is looking forward to this.  Her son;s platelet numbers are down again and her other son has been sick and this has caused more stress.

## 2024-09-16 ENCOUNTER — TELEMEDICINE (OUTPATIENT)
Dept: BEHAVIORAL/MENTAL HEALTH CLINIC | Age: 34
End: 2024-09-16
Payer: COMMERCIAL

## 2024-09-16 DIAGNOSIS — F41.1 GENERALIZED ANXIETY DISORDER: ICD-10-CM

## 2024-09-16 DIAGNOSIS — F90.0 ATTENTION DEFICIT HYPERACTIVITY DISORDER (ADHD), PREDOMINANTLY INATTENTIVE TYPE: ICD-10-CM

## 2024-09-16 DIAGNOSIS — F50.81 BINGE EATING DISORDER: ICD-10-CM

## 2024-09-16 DIAGNOSIS — F33.42 RECURRENT MAJOR DEPRESSIVE DISORDER, IN FULL REMISSION (HCC): Primary | ICD-10-CM

## 2024-09-16 PROCEDURE — G8427 DOCREV CUR MEDS BY ELIG CLIN: HCPCS | Performed by: PSYCHIATRY & NEUROLOGY

## 2024-09-16 PROCEDURE — 99214 OFFICE O/P EST MOD 30 MIN: CPT | Performed by: PSYCHIATRY & NEUROLOGY

## 2024-09-16 RX ORDER — ALPRAZOLAM 0.5 MG
0.5 TABLET ORAL 2 TIMES DAILY PRN
Qty: 60 TABLET | Refills: 3 | Status: SHIPPED | OUTPATIENT
Start: 2024-09-16 | End: 2025-01-14

## 2024-09-16 RX ORDER — DULOXETIN HYDROCHLORIDE 60 MG/1
60 CAPSULE, DELAYED RELEASE ORAL DAILY
Qty: 90 CAPSULE | Refills: 1 | Status: SHIPPED | OUTPATIENT
Start: 2024-09-16

## 2024-09-16 RX ORDER — LAMOTRIGINE 100 MG/1
100 TABLET ORAL DAILY
Qty: 90 TABLET | Refills: 1 | Status: SHIPPED | OUTPATIENT
Start: 2024-09-16

## 2024-09-16 RX ORDER — LEVOMEFOLATE CALCIUM 15 MG
15 TABLET ORAL DAILY
Qty: 30 TABLET | Refills: 3 | Status: SHIPPED | OUTPATIENT
Start: 2024-09-16

## 2024-09-16 RX ORDER — FAMOTIDINE 20 MG/1
20 TABLET, FILM COATED ORAL DAILY
COMMUNITY

## 2024-09-16 RX ORDER — LISDEXAMFETAMINE DIMESYLATE 30 MG/1
30 CAPSULE ORAL DAILY
Qty: 30 CAPSULE | Refills: 0 | Status: CANCELLED | OUTPATIENT
Start: 2024-09-16 | End: 2024-10-16

## 2024-09-16 ASSESSMENT — PATIENT HEALTH QUESTIONNAIRE - PHQ9
4. FEELING TIRED OR HAVING LITTLE ENERGY: NOT AT ALL
8. MOVING OR SPEAKING SO SLOWLY THAT OTHER PEOPLE COULD HAVE NOTICED. OR THE OPPOSITE, BEING SO FIGETY OR RESTLESS THAT YOU HAVE BEEN MOVING AROUND A LOT MORE THAN USUAL: NOT AT ALL
SUM OF ALL RESPONSES TO PHQ9 QUESTIONS 1 & 2: 0
SUM OF ALL RESPONSES TO PHQ QUESTIONS 1-9: 0
9. THOUGHTS THAT YOU WOULD BE BETTER OFF DEAD, OR OF HURTING YOURSELF: NOT AT ALL
5. POOR APPETITE OR OVEREATING: NOT AT ALL
3. TROUBLE FALLING OR STAYING ASLEEP: NOT AT ALL
SUM OF ALL RESPONSES TO PHQ QUESTIONS 1-9: 0
SUM OF ALL RESPONSES TO PHQ QUESTIONS 1-9: 0
10. IF YOU CHECKED OFF ANY PROBLEMS, HOW DIFFICULT HAVE THESE PROBLEMS MADE IT FOR YOU TO DO YOUR WORK, TAKE CARE OF THINGS AT HOME, OR GET ALONG WITH OTHER PEOPLE: NOT DIFFICULT AT ALL
SUM OF ALL RESPONSES TO PHQ QUESTIONS 1-9: 0
2. FEELING DOWN, DEPRESSED OR HOPELESS: NOT AT ALL
6. FEELING BAD ABOUT YOURSELF - OR THAT YOU ARE A FAILURE OR HAVE LET YOURSELF OR YOUR FAMILY DOWN: NOT AT ALL
7. TROUBLE CONCENTRATING ON THINGS, SUCH AS READING THE NEWSPAPER OR WATCHING TELEVISION: NOT AT ALL
1. LITTLE INTEREST OR PLEASURE IN DOING THINGS: NOT AT ALL

## 2024-09-16 ASSESSMENT — ANXIETY QUESTIONNAIRES
7. FEELING AFRAID AS IF SOMETHING AWFUL MIGHT HAPPEN: NOT AT ALL
5. BEING SO RESTLESS THAT IT IS HARD TO SIT STILL: NOT AT ALL
3. WORRYING TOO MUCH ABOUT DIFFERENT THINGS: NOT AT ALL
2. NOT BEING ABLE TO STOP OR CONTROL WORRYING: NOT AT ALL
GAD7 TOTAL SCORE: 0
1. FEELING NERVOUS, ANXIOUS, OR ON EDGE: NOT AT ALL
IF YOU CHECKED OFF ANY PROBLEMS ON THIS QUESTIONNAIRE, HOW DIFFICULT HAVE THESE PROBLEMS MADE IT FOR YOU TO DO YOUR WORK, TAKE CARE OF THINGS AT HOME, OR GET ALONG WITH OTHER PEOPLE: NOT DIFFICULT AT ALL
4. TROUBLE RELAXING: NOT AT ALL
6. BECOMING EASILY ANNOYED OR IRRITABLE: NOT AT ALL

## 2024-09-30 ENCOUNTER — OFFICE VISIT (OUTPATIENT)
Dept: BEHAVIORAL/MENTAL HEALTH CLINIC | Facility: CLINIC | Age: 34
End: 2024-09-30
Payer: COMMERCIAL

## 2024-09-30 DIAGNOSIS — F41.1 GENERALIZED ANXIETY DISORDER: Primary | ICD-10-CM

## 2024-09-30 DIAGNOSIS — F90.0 ATTENTION DEFICIT HYPERACTIVITY DISORDER (ADHD), PREDOMINANTLY INATTENTIVE TYPE: ICD-10-CM

## 2024-09-30 DIAGNOSIS — F33.0 MILD EPISODE OF RECURRENT MAJOR DEPRESSIVE DISORDER (HCC): ICD-10-CM

## 2024-09-30 PROCEDURE — 90837 PSYTX W PT 60 MINUTES: CPT | Performed by: SOCIAL WORKER

## 2024-09-30 PROCEDURE — 1036F TOBACCO NON-USER: CPT | Performed by: SOCIAL WORKER

## 2024-10-01 NOTE — PSYCHOTHERAPY
INDIVIDUAL THERAPY NOTE  NAME: Sarah albert Emma    Good time on her trip to [place] with her spouse.  Her son's ITP has exacerbated.  WBC [id number] and this has been stressful.  He is on steroids.  Pt denies unhealthy, stress eating, bit not the same degree of binge eating like the past.  Discussed ways to decrease stress in her and how to make healthier lifestyle easier.

## 2024-10-01 NOTE — PROGRESS NOTES
INDIVIDUAL THERAPY NOTE  NAME: Sarah Carter    DATE: 10/1/2024    TYPE OF SERVICE: Individual Therapy    LOCATION OF SERVICE: [facility]    DURATION: 55 mins    DIAGNOSIS: :    1. Generalized anxiety disorder    2. Attention deficit hyperactivity disorder (ADHD), predominantly inattentive type    3. Mild episode of recurrent major depressive disorder (HCC)        New Symptoms:   [x]No  []Yes:  Progress Status:  [x]Improving []Declining []Stable []Static  Change in Goals:  [x]No  []Yes    Chief Complaint:symptoms of depression, anxiety ADHD    Plan: CBT, DBT, CPT, Humanistic/Client Centered, ACT, Seeking Safety, Psychodynamic, ERP, IFS, and Poly Vagal Theory may be used to address symptoms associated with pt diagnosis and goals.    Mental Status Exam:  Pt was appropriately dressed and groomed.  Pt was 0x4. No unusual mannerisms were noted.  No psychotic symptoms displayed by pt.  Pt was cooperative and engaged in the session.  Insight and judgment were intact.  Denied active suicidal or homicidal ideation.  Fund of knowledge and attention span are WNL.  Denies developmental or language difficulties.  Mood/Affect: mildly depressed and anxious with congruent affect  Thought Process:linear and coherent    Summary of Service:  This SW met with the pt face to face in the office.  The pt updated this SW on her mental status.  Goals continued to be addressed using various therapeutic models listed above.  HW is given as deemed appropriate.  Guided questions were asked, support and active listening were provided, distorted thinking was challenged, and strengths were validated and reinforced.  Motivational interviewing questions were asked to promote change.  Other therapeutic techniques were also utilized based upon the pt's needs and issues.    Issues Addressed:       []Trauma    []Grief    [x]Anxiety/Panic Attacks    [x]Depression    []Relational Issues   [x]Self Esteem    []Anger

## 2024-10-15 ENCOUNTER — TELEMEDICINE (OUTPATIENT)
Dept: BEHAVIORAL/MENTAL HEALTH CLINIC | Facility: CLINIC | Age: 34
End: 2024-10-15
Payer: COMMERCIAL

## 2024-10-15 DIAGNOSIS — F33.0 MILD EPISODE OF RECURRENT MAJOR DEPRESSIVE DISORDER (HCC): ICD-10-CM

## 2024-10-15 DIAGNOSIS — F90.0 ATTENTION DEFICIT HYPERACTIVITY DISORDER (ADHD), PREDOMINANTLY INATTENTIVE TYPE: ICD-10-CM

## 2024-10-15 DIAGNOSIS — F41.1 GENERALIZED ANXIETY DISORDER: Primary | ICD-10-CM

## 2024-10-15 PROCEDURE — 90834 PSYTX W PT 45 MINUTES: CPT | Performed by: SOCIAL WORKER

## 2024-10-15 PROCEDURE — 1036F TOBACCO NON-USER: CPT | Performed by: SOCIAL WORKER

## 2024-10-15 NOTE — PROGRESS NOTES
Up: 2 weeks        9/16/2024    10:41 AM 6/25/2024     1:54 PM 4/9/2024     2:48 PM   PHQ-9    Little interest or pleasure in doing things 0 0 0   Feeling down, depressed, or hopeless 0 0 0   Trouble falling or staying asleep, or sleeping too much 0 0 1   Feeling tired or having little energy 0 1 1   Poor appetite or overeating 0 0 0   Feeling bad about yourself - or that you are a failure or have let yourself or your family down 0 0 0   Trouble concentrating on things, such as reading the newspaper or watching television 0 0 0   Moving or speaking so slowly that other people could have noticed. Or the opposite - being so fidgety or restless that you have been moving around a lot more than usual 0 0 0   Thoughts that you would be better off dead, or of hurting yourself in some way 0 0 0   PHQ-2 Score 0 0 0   PHQ-9 Total Score 0 1 2   If you checked off any problems, how difficult have these problems made it for you to do your work, take care of things at home, or get along with other people? 0  1        Consent: Sarah Carter, who was seen by synchronous (real-time) audio-video technology, and/or her healthcare decision maker, is aware that this patient-initiated, Telehealth encounter on 10/15/2024 is a billable service, with coverage as determined by her insurance carrier. She is aware that she may receive a bill and has provided verbal consent to proceed: Yes    Total Time:  50  Sarah Carter, was evaluated through a synchronous (real-time) audio-video encounter. The patient (or guardian if applicable) is aware that this is a billable service, which includes applicable co-pays. This Virtual Visit was conducted with patient's (and/or legal guardian's) consent. Patient identification was verified, and a caregiver was present when appropriate.   The patient was located at Home: [address] [place] [phone]  Provider was located at Facility (Appt Dept): Paresh Felder

## 2024-10-16 NOTE — PSYCHOTHERAPY
INDIVIDUAL THERAPY NOTE  NAME: Sarah Carter    Pt's son's numbers dropped and he now has to take medication.  This has caused increased stress.  The pt still feels immense stress from her job.  Her thoughts and feelings about her job were processed and support was given.

## 2024-11-01 ENCOUNTER — OFFICE VISIT (OUTPATIENT)
Dept: BEHAVIORAL/MENTAL HEALTH CLINIC | Facility: CLINIC | Age: 34
End: 2024-11-01
Payer: COMMERCIAL

## 2024-11-01 DIAGNOSIS — F41.1 GENERALIZED ANXIETY DISORDER: Primary | ICD-10-CM

## 2024-11-01 DIAGNOSIS — F33.0 MILD EPISODE OF RECURRENT MAJOR DEPRESSIVE DISORDER (HCC): ICD-10-CM

## 2024-11-01 DIAGNOSIS — F90.0 ATTENTION DEFICIT HYPERACTIVITY DISORDER (ADHD), PREDOMINANTLY INATTENTIVE TYPE: ICD-10-CM

## 2024-11-01 PROCEDURE — 90837 PSYTX W PT 60 MINUTES: CPT | Performed by: SOCIAL WORKER

## 2024-11-01 PROCEDURE — 1036F TOBACCO NON-USER: CPT | Performed by: SOCIAL WORKER

## 2024-11-01 NOTE — PSYCHOTHERAPY
INDIVIDUAL THERAPY NOTE  NAME: Sarah Maria Guadalupe Carter      Pt had her shoulder surgery and she has to wear a brace for several months and her mobility is limited.  She has had help.  Slowing down and caring for her self were discussed.  Her oldest son has been stable.  She has a friend with a drinking problem and how to set healthy limits was discussed.

## 2024-11-01 NOTE — PROGRESS NOTES
Management    []Mood Instability  []ADHD Symptoms  [x]Other- boundaries       Follow Up: 2 weeks        9/16/2024    10:41 AM 6/25/2024     1:54 PM 4/9/2024     2:48 PM   PHQ-9    Little interest or pleasure in doing things 0 0 0   Feeling down, depressed, or hopeless 0 0 0   Trouble falling or staying asleep, or sleeping too much 0 0 1   Feeling tired or having little energy 0 1 1   Poor appetite or overeating 0 0 0   Feeling bad about yourself - or that you are a failure or have let yourself or your family down 0 0 0   Trouble concentrating on things, such as reading the newspaper or watching television 0 0 0   Moving or speaking so slowly that other people could have noticed. Or the opposite - being so fidgety or restless that you have been moving around a lot more than usual 0 0 0   Thoughts that you would be better off dead, or of hurting yourself in some way 0 0 0   PHQ-2 Score 0 0 0   PHQ-9 Total Score 0 1 2   If you checked off any problems, how difficult have these problems made it for you to do your work, take care of things at home, or get along with other people? 0  1

## 2024-11-12 ENCOUNTER — TELEMEDICINE (OUTPATIENT)
Dept: BEHAVIORAL/MENTAL HEALTH CLINIC | Facility: CLINIC | Age: 34
End: 2024-11-12
Payer: COMMERCIAL

## 2024-11-12 DIAGNOSIS — F33.0 MILD EPISODE OF RECURRENT MAJOR DEPRESSIVE DISORDER (HCC): ICD-10-CM

## 2024-11-12 DIAGNOSIS — F90.0 ATTENTION DEFICIT HYPERACTIVITY DISORDER (ADHD), PREDOMINANTLY INATTENTIVE TYPE: ICD-10-CM

## 2024-11-12 DIAGNOSIS — F41.1 GENERALIZED ANXIETY DISORDER: Primary | ICD-10-CM

## 2024-11-12 PROCEDURE — 1036F TOBACCO NON-USER: CPT | Performed by: SOCIAL WORKER

## 2024-11-12 PROCEDURE — 90837 PSYTX W PT 60 MINUTES: CPT | Performed by: SOCIAL WORKER

## 2024-11-12 ASSESSMENT — PATIENT HEALTH QUESTIONNAIRE - PHQ9
SUM OF ALL RESPONSES TO PHQ QUESTIONS 1-9: 3
SUM OF ALL RESPONSES TO PHQ9 QUESTIONS 1 & 2: 0
4. FEELING TIRED OR HAVING LITTLE ENERGY: SEVERAL DAYS
1. LITTLE INTEREST OR PLEASURE IN DOING THINGS: NOT AT ALL
SUM OF ALL RESPONSES TO PHQ QUESTIONS 1-9: 3
8. MOVING OR SPEAKING SO SLOWLY THAT OTHER PEOPLE COULD HAVE NOTICED. OR THE OPPOSITE, BEING SO FIGETY OR RESTLESS THAT YOU HAVE BEEN MOVING AROUND A LOT MORE THAN USUAL: NOT AT ALL
5. POOR APPETITE OR OVEREATING: SEVERAL DAYS
9. THOUGHTS THAT YOU WOULD BE BETTER OFF DEAD, OR OF HURTING YOURSELF: NOT AT ALL
SUM OF ALL RESPONSES TO PHQ QUESTIONS 1-9: 3
7. TROUBLE CONCENTRATING ON THINGS, SUCH AS READING THE NEWSPAPER OR WATCHING TELEVISION: NOT AT ALL
3. TROUBLE FALLING OR STAYING ASLEEP: SEVERAL DAYS
SUM OF ALL RESPONSES TO PHQ QUESTIONS 1-9: 3
6. FEELING BAD ABOUT YOURSELF - OR THAT YOU ARE A FAILURE OR HAVE LET YOURSELF OR YOUR FAMILY DOWN: NOT AT ALL
2. FEELING DOWN, DEPRESSED OR HOPELESS: NOT AT ALL

## 2024-11-12 NOTE — PSYCHOTHERAPY
INDIVIDUAL THERAPY NOTE  NAME: Sarah Carter    The pt states she has been okay.  She wants to change her job so she is less stressed but she still wants to be challenged.  She hopes this will occur.   has been more agitated due to his mother's recent health issues.  Triggers worry about relapse and this was processed.

## 2024-11-12 NOTE — PROGRESS NOTES
Issues/Boundaries  []Self Esteem    []Anger Management  []Mood Instability  []Other     Follow Up:2 weeks        11/12/2024     4:19 PM 9/16/2024    10:41 AM 6/25/2024     1:54 PM   PHQ-9    Little interest or pleasure in doing things 0 0 0   Feeling down, depressed, or hopeless 0 0 0   Trouble falling or staying asleep, or sleeping too much 1 0 0   Feeling tired or having little energy 1 0 1   Poor appetite or overeating 1 0 0   Feeling bad about yourself - or that you are a failure or have let yourself or your family down 0 0 0   Trouble concentrating on things, such as reading the newspaper or watching television 0 0 0   Moving or speaking so slowly that other people could have noticed. Or the opposite - being so fidgety or restless that you have been moving around a lot more than usual 0 0 0   Thoughts that you would be better off dead, or of hurting yourself in some way 0 0 0   PHQ-2 Score 0 0 0   PHQ-9 Total Score 3 0 1   If you checked off any problems, how difficult have these problems made it for you to do your work, take care of things at home, or get along with other people?  0         Consent: Sarah Carter, who was seen by synchronous (real-time) audio-video technology, and/or her healthcare decision maker, is aware that this patient-initiated, Telehealth encounter on 11/12/2024 is a billable service, with coverage as determined by her insurance carrier. She is aware that she may receive a bill and has provided verbal consent to proceed: Yes    Total Time: 60  Sarah Carter, was evaluated through a synchronous (real-time) audio-video encounter. The patient (or guardian if applicable) is aware that this is a billable service, which includes applicable co-pays. This Virtual Visit was conducted with patient's (and/or legal guardian's) consent. Patient identification was verified, and a caregiver was present when appropriate.   The patient was located at Home: [address] [place] [address]

## 2025-01-02 ASSESSMENT — PATIENT HEALTH QUESTIONNAIRE - PHQ9
8. MOVING OR SPEAKING SO SLOWLY THAT OTHER PEOPLE COULD HAVE NOTICED. OR THE OPPOSITE, BEING SO FIGETY OR RESTLESS THAT YOU HAVE BEEN MOVING AROUND A LOT MORE THAN USUAL: NOT AT ALL
4. FEELING TIRED OR HAVING LITTLE ENERGY: NOT AT ALL
6. FEELING BAD ABOUT YOURSELF - OR THAT YOU ARE A FAILURE OR HAVE LET YOURSELF OR YOUR FAMILY DOWN: NOT AT ALL
2. FEELING DOWN, DEPRESSED OR HOPELESS: NOT AT ALL
1. LITTLE INTEREST OR PLEASURE IN DOING THINGS: NOT AT ALL
2. FEELING DOWN, DEPRESSED OR HOPELESS: NOT AT ALL
4. FEELING TIRED OR HAVING LITTLE ENERGY: NOT AT ALL
7. TROUBLE CONCENTRATING ON THINGS, SUCH AS READING THE NEWSPAPER OR WATCHING TELEVISION: NOT AT ALL
SUM OF ALL RESPONSES TO PHQ QUESTIONS 1-9: 2
3. TROUBLE FALLING OR STAYING ASLEEP: NOT AT ALL
SUM OF ALL RESPONSES TO PHQ QUESTIONS 1-9: 2
7. TROUBLE CONCENTRATING ON THINGS, SUCH AS READING THE NEWSPAPER OR WATCHING TELEVISION: NOT AT ALL
6. FEELING BAD ABOUT YOURSELF - OR THAT YOU ARE A FAILURE OR HAVE LET YOURSELF OR YOUR FAMILY DOWN: NOT AT ALL
SUM OF ALL RESPONSES TO PHQ QUESTIONS 1-9: 2
SUM OF ALL RESPONSES TO PHQ9 QUESTIONS 1 & 2: 0
1. LITTLE INTEREST OR PLEASURE IN DOING THINGS: NOT AT ALL
10. IF YOU CHECKED OFF ANY PROBLEMS, HOW DIFFICULT HAVE THESE PROBLEMS MADE IT FOR YOU TO DO YOUR WORK, TAKE CARE OF THINGS AT HOME, OR GET ALONG WITH OTHER PEOPLE: NOT DIFFICULT AT ALL
8. MOVING OR SPEAKING SO SLOWLY THAT OTHER PEOPLE COULD HAVE NOTICED. OR THE OPPOSITE - BEING SO FIDGETY OR RESTLESS THAT YOU HAVE BEEN MOVING AROUND A LOT MORE THAN USUAL: NOT AT ALL
9. THOUGHTS THAT YOU WOULD BE BETTER OFF DEAD, OR OF HURTING YOURSELF: NOT AT ALL
3. TROUBLE FALLING OR STAYING ASLEEP: NOT AT ALL
5. POOR APPETITE OR OVEREATING: MORE THAN HALF THE DAYS
SUM OF ALL RESPONSES TO PHQ QUESTIONS 1-9: 2
SUM OF ALL RESPONSES TO PHQ QUESTIONS 1-9: 2
9. THOUGHTS THAT YOU WOULD BE BETTER OFF DEAD, OR OF HURTING YOURSELF: NOT AT ALL
10. IF YOU CHECKED OFF ANY PROBLEMS, HOW DIFFICULT HAVE THESE PROBLEMS MADE IT FOR YOU TO DO YOUR WORK, TAKE CARE OF THINGS AT HOME, OR GET ALONG WITH OTHER PEOPLE: NOT DIFFICULT AT ALL
5. POOR APPETITE OR OVEREATING: MORE THAN HALF THE DAYS

## 2025-01-02 ASSESSMENT — ANXIETY QUESTIONNAIRES
GAD7 TOTAL SCORE: 0
1. FEELING NERVOUS, ANXIOUS, OR ON EDGE: NOT AT ALL
3. WORRYING TOO MUCH ABOUT DIFFERENT THINGS: NOT AT ALL
7. FEELING AFRAID AS IF SOMETHING AWFUL MIGHT HAPPEN: NOT AT ALL
2. NOT BEING ABLE TO STOP OR CONTROL WORRYING: NOT AT ALL
6. BECOMING EASILY ANNOYED OR IRRITABLE: NOT AT ALL
7. FEELING AFRAID AS IF SOMETHING AWFUL MIGHT HAPPEN: NOT AT ALL
4. TROUBLE RELAXING: NOT AT ALL
1. FEELING NERVOUS, ANXIOUS, OR ON EDGE: NOT AT ALL
3. WORRYING TOO MUCH ABOUT DIFFERENT THINGS: NOT AT ALL
2. NOT BEING ABLE TO STOP OR CONTROL WORRYING: NOT AT ALL
5. BEING SO RESTLESS THAT IT IS HARD TO SIT STILL: NOT AT ALL
IF YOU CHECKED OFF ANY PROBLEMS ON THIS QUESTIONNAIRE, HOW DIFFICULT HAVE THESE PROBLEMS MADE IT FOR YOU TO DO YOUR WORK, TAKE CARE OF THINGS AT HOME, OR GET ALONG WITH OTHER PEOPLE: NOT DIFFICULT AT ALL
6. BECOMING EASILY ANNOYED OR IRRITABLE: NOT AT ALL
5. BEING SO RESTLESS THAT IT IS HARD TO SIT STILL: NOT AT ALL
4. TROUBLE RELAXING: NOT AT ALL
IF YOU CHECKED OFF ANY PROBLEMS ON THIS QUESTIONNAIRE, HOW DIFFICULT HAVE THESE PROBLEMS MADE IT FOR YOU TO DO YOUR WORK, TAKE CARE OF THINGS AT HOME, OR GET ALONG WITH OTHER PEOPLE: NOT DIFFICULT AT ALL

## 2025-01-03 ENCOUNTER — TELEMEDICINE (OUTPATIENT)
Dept: BEHAVIORAL/MENTAL HEALTH CLINIC | Age: 35
End: 2025-01-03
Payer: COMMERCIAL

## 2025-01-03 DIAGNOSIS — F33.42 RECURRENT MAJOR DEPRESSIVE DISORDER, IN FULL REMISSION (HCC): Primary | ICD-10-CM

## 2025-01-03 DIAGNOSIS — F50.810 MILD BINGE-EATING DISORDER: ICD-10-CM

## 2025-01-03 DIAGNOSIS — F90.0 ATTENTION DEFICIT HYPERACTIVITY DISORDER (ADHD), PREDOMINANTLY INATTENTIVE TYPE: ICD-10-CM

## 2025-01-03 DIAGNOSIS — F41.1 GENERALIZED ANXIETY DISORDER: ICD-10-CM

## 2025-01-03 DIAGNOSIS — Z65.8 PSYCHOSOCIAL STRESSORS: ICD-10-CM

## 2025-01-03 PROCEDURE — 99214 OFFICE O/P EST MOD 30 MIN: CPT | Performed by: PSYCHIATRY & NEUROLOGY

## 2025-01-03 RX ORDER — DULOXETIN HYDROCHLORIDE 60 MG/1
60 CAPSULE, DELAYED RELEASE ORAL DAILY
Qty: 90 CAPSULE | Refills: 1 | Status: SHIPPED | OUTPATIENT
Start: 2025-01-03

## 2025-01-03 RX ORDER — ALPRAZOLAM 0.5 MG
0.5 TABLET ORAL 2 TIMES DAILY PRN
Qty: 60 TABLET | Refills: 3 | Status: SHIPPED | OUTPATIENT
Start: 2025-01-03 | End: 2025-05-03

## 2025-01-03 RX ORDER — LEVOMEFOLATE CALCIUM 15 MG
15 TABLET ORAL DAILY
Qty: 30 TABLET | Refills: 3 | Status: CANCELLED | OUTPATIENT
Start: 2025-01-03

## 2025-01-03 RX ORDER — LAMOTRIGINE 100 MG/1
100 TABLET ORAL DAILY
Qty: 90 TABLET | Refills: 1 | Status: SHIPPED | OUTPATIENT
Start: 2025-01-03

## 2025-02-25 ENCOUNTER — OFFICE VISIT (OUTPATIENT)
Dept: OBGYN CLINIC | Age: 35
End: 2025-02-25

## 2025-02-25 VITALS
HEIGHT: 65 IN | DIASTOLIC BLOOD PRESSURE: 80 MMHG | BODY MASS INDEX: 35.49 KG/M2 | SYSTOLIC BLOOD PRESSURE: 118 MMHG | WEIGHT: 213 LBS

## 2025-02-25 DIAGNOSIS — N93.9 ABNORMAL UTERINE BLEEDING (AUB): Primary | ICD-10-CM

## 2025-02-25 DIAGNOSIS — Z97.5 IUD (INTRAUTERINE DEVICE) IN PLACE: ICD-10-CM

## 2025-02-25 PROCEDURE — 99214 OFFICE O/P EST MOD 30 MIN: CPT | Performed by: OBSTETRICS & GYNECOLOGY

## 2025-02-25 RX ORDER — DROSPIRENONE AND ETHINYL ESTRADIOL 0.02-3(28)
1 KIT ORAL DAILY
Qty: 1 PACKET | Refills: 11 | Status: SHIPPED | OUTPATIENT
Start: 2025-02-25

## 2025-02-25 SDOH — ECONOMIC STABILITY: FOOD INSECURITY: WITHIN THE PAST 12 MONTHS, THE FOOD YOU BOUGHT JUST DIDN'T LAST AND YOU DIDN'T HAVE MONEY TO GET MORE.: NEVER TRUE

## 2025-02-25 SDOH — ECONOMIC STABILITY: FOOD INSECURITY: WITHIN THE PAST 12 MONTHS, YOU WORRIED THAT YOUR FOOD WOULD RUN OUT BEFORE YOU GOT MONEY TO BUY MORE.: NEVER TRUE

## 2025-02-25 NOTE — PROGRESS NOTES
CC:   Chief Complaint   Patient presents with    Irregular Menses     Bleeding that lasts for weeks with clots.          HPI:    Sarah  is a 34 y.o. , No obstetric history on file., No LMP recorded. (Menstrual status: IUD).,  who is seen for worsening bleeding. Had IUD placed by me in 2022 and cycles were pretty regular and light. In the last 6 months cycles started becoming heavier and then prolonged. Passing small clots. No pain. No new partners. No concern for infection.     Contraception:  Mirena IUD (placed 5/2022)     TSH 1/2024 3.6      GYN HISTORY:  As per HPI     Last Pap:  7/2021       Current Outpatient Medications on File Prior to Visit   Medication Sig Dispense Refill    ALPRAZolam (XANAX) 0.5 MG tablet Take 1 tablet by mouth 2 times daily as needed for Sleep or Anxiety for up to 120 days. Max Daily Amount: 1 mg 60 tablet 3    DULoxetine (CYMBALTA) 60 MG extended release capsule Take 1 capsule by mouth daily 90 capsule 1    lamoTRIgine (LAMICTAL) 100 MG tablet Take 1 tablet by mouth daily 90 tablet 1    famotidine (PEPCID) 20 MG tablet 1 tablet daily      L-Methylfolate 15 MG TABS Take 15 mg by mouth daily 30 tablet 3    montelukast (SINGULAIR) 10 MG tablet Take 1 tablet by mouth nightly      fluticasone (FLONASE) 50 MCG/ACT nasal spray 2 sprays by Nasal route daily      Probiotic Product (PROBIOTIC PO) Take by mouth      albuterol sulfate HFA (VENTOLIN HFA) 108 (90 Base) MCG/ACT inhaler Inhale 2 puffs into the lungs 4 times daily as needed for Wheezing 54 g 1    ibuprofen (ADVIL;MOTRIN) 600 MG tablet Take 1 tablet by mouth      dicyclomine (BENTYL) 10 MG capsule TAKE 1 CAPSULE BY MOUTH FOUR TIMES A DAY AS NEEDED      levonorgestrel (MIRENA, 52 MG,) IUD 52 mg 1 each by IntraUTERine route once      azithromycin (ZITHROMAX) 250 MG tablet Take 1 tablet by mouth daily (Patient not taking: Reported on 4/9/2024)      Fexofenadine HCl (ALLEGRA PO) Take by mouth daily (Patient not taking: Reported on

## 2025-03-26 ENCOUNTER — TELEMEDICINE (OUTPATIENT)
Dept: BEHAVIORAL/MENTAL HEALTH CLINIC | Age: 35
End: 2025-03-26

## 2025-03-26 DIAGNOSIS — F90.0 ATTENTION DEFICIT HYPERACTIVITY DISORDER (ADHD), PREDOMINANTLY INATTENTIVE TYPE: ICD-10-CM

## 2025-03-26 DIAGNOSIS — F41.1 GENERALIZED ANXIETY DISORDER: ICD-10-CM

## 2025-03-26 DIAGNOSIS — F51.02 ADJUSTMENT INSOMNIA: ICD-10-CM

## 2025-03-26 DIAGNOSIS — F33.42 RECURRENT MAJOR DEPRESSIVE DISORDER, IN FULL REMISSION: Primary | ICD-10-CM

## 2025-03-26 DIAGNOSIS — F50.810 MILD BINGE-EATING DISORDER: ICD-10-CM

## 2025-03-26 DIAGNOSIS — Z65.8 PSYCHOSOCIAL STRESSORS: ICD-10-CM

## 2025-03-26 PROCEDURE — 99214 OFFICE O/P EST MOD 30 MIN: CPT | Performed by: PSYCHIATRY & NEUROLOGY

## 2025-03-26 RX ORDER — ALPRAZOLAM 0.5 MG
0.5 TABLET ORAL 2 TIMES DAILY PRN
Qty: 60 TABLET | Refills: 3 | Status: SHIPPED | OUTPATIENT
Start: 2025-03-26 | End: 2025-07-24

## 2025-03-26 RX ORDER — DULOXETIN HYDROCHLORIDE 60 MG/1
60 CAPSULE, DELAYED RELEASE ORAL DAILY
Qty: 90 CAPSULE | Refills: 1 | Status: SHIPPED | OUTPATIENT
Start: 2025-03-26

## 2025-03-26 RX ORDER — LEVOMEFOLATE CALCIUM 15 MG
15 TABLET ORAL DAILY
Qty: 30 TABLET | Refills: 3 | Status: CANCELLED | OUTPATIENT
Start: 2025-03-26

## 2025-03-26 RX ORDER — TRAZODONE HYDROCHLORIDE 50 MG/1
50-100 TABLET ORAL NIGHTLY
Qty: 60 TABLET | Refills: 2 | Status: SHIPPED | OUTPATIENT
Start: 2025-03-26

## 2025-03-26 RX ORDER — LAMOTRIGINE 100 MG/1
100 TABLET ORAL DAILY
Qty: 90 TABLET | Refills: 1 | Status: SHIPPED | OUTPATIENT
Start: 2025-03-26

## 2025-03-26 ASSESSMENT — ANXIETY QUESTIONNAIRES
7. FEELING AFRAID AS IF SOMETHING AWFUL MIGHT HAPPEN: NOT AT ALL
1. FEELING NERVOUS, ANXIOUS, OR ON EDGE: NOT AT ALL
6. BECOMING EASILY ANNOYED OR IRRITABLE: SEVERAL DAYS
5. BEING SO RESTLESS THAT IT IS HARD TO SIT STILL: NOT AT ALL
5. BEING SO RESTLESS THAT IT IS HARD TO SIT STILL: NOT AT ALL
3. WORRYING TOO MUCH ABOUT DIFFERENT THINGS: NOT AT ALL
GAD7 TOTAL SCORE: 2
2. NOT BEING ABLE TO STOP OR CONTROL WORRYING: NOT AT ALL
2. NOT BEING ABLE TO STOP OR CONTROL WORRYING: NOT AT ALL
4. TROUBLE RELAXING: SEVERAL DAYS
1. FEELING NERVOUS, ANXIOUS, OR ON EDGE: NOT AT ALL
6. BECOMING EASILY ANNOYED OR IRRITABLE: SEVERAL DAYS
3. WORRYING TOO MUCH ABOUT DIFFERENT THINGS: NOT AT ALL
IF YOU CHECKED OFF ANY PROBLEMS ON THIS QUESTIONNAIRE, HOW DIFFICULT HAVE THESE PROBLEMS MADE IT FOR YOU TO DO YOUR WORK, TAKE CARE OF THINGS AT HOME, OR GET ALONG WITH OTHER PEOPLE: SOMEWHAT DIFFICULT
7. FEELING AFRAID AS IF SOMETHING AWFUL MIGHT HAPPEN: NOT AT ALL
4. TROUBLE RELAXING: SEVERAL DAYS
IF YOU CHECKED OFF ANY PROBLEMS ON THIS QUESTIONNAIRE, HOW DIFFICULT HAVE THESE PROBLEMS MADE IT FOR YOU TO DO YOUR WORK, TAKE CARE OF THINGS AT HOME, OR GET ALONG WITH OTHER PEOPLE: SOMEWHAT DIFFICULT

## 2025-03-26 ASSESSMENT — PATIENT HEALTH QUESTIONNAIRE - PHQ9
7. TROUBLE CONCENTRATING ON THINGS, SUCH AS READING THE NEWSPAPER OR WATCHING TELEVISION: NOT AT ALL
SUM OF ALL RESPONSES TO PHQ QUESTIONS 1-9: 3
1. LITTLE INTEREST OR PLEASURE IN DOING THINGS: NOT AT ALL
6. FEELING BAD ABOUT YOURSELF - OR THAT YOU ARE A FAILURE OR HAVE LET YOURSELF OR YOUR FAMILY DOWN: NOT AT ALL
9. THOUGHTS THAT YOU WOULD BE BETTER OFF DEAD, OR OF HURTING YOURSELF: NOT AT ALL
3. TROUBLE FALLING OR STAYING ASLEEP: MORE THAN HALF THE DAYS
4. FEELING TIRED OR HAVING LITTLE ENERGY: SEVERAL DAYS
SUM OF ALL RESPONSES TO PHQ QUESTIONS 1-9: 3
10. IF YOU CHECKED OFF ANY PROBLEMS, HOW DIFFICULT HAVE THESE PROBLEMS MADE IT FOR YOU TO DO YOUR WORK, TAKE CARE OF THINGS AT HOME, OR GET ALONG WITH OTHER PEOPLE: SOMEWHAT DIFFICULT
SUM OF ALL RESPONSES TO PHQ QUESTIONS 1-9: 3
8. MOVING OR SPEAKING SO SLOWLY THAT OTHER PEOPLE COULD HAVE NOTICED. OR THE OPPOSITE, BEING SO FIGETY OR RESTLESS THAT YOU HAVE BEEN MOVING AROUND A LOT MORE THAN USUAL: NOT AT ALL
6. FEELING BAD ABOUT YOURSELF - OR THAT YOU ARE A FAILURE OR HAVE LET YOURSELF OR YOUR FAMILY DOWN: NOT AT ALL
3. TROUBLE FALLING OR STAYING ASLEEP: MORE THAN HALF THE DAYS
1. LITTLE INTEREST OR PLEASURE IN DOING THINGS: NOT AT ALL
8. MOVING OR SPEAKING SO SLOWLY THAT OTHER PEOPLE COULD HAVE NOTICED. OR THE OPPOSITE - BEING SO FIDGETY OR RESTLESS THAT YOU HAVE BEEN MOVING AROUND A LOT MORE THAN USUAL: NOT AT ALL
SUM OF ALL RESPONSES TO PHQ QUESTIONS 1-9: 3
5. POOR APPETITE OR OVEREATING: NOT AT ALL
4. FEELING TIRED OR HAVING LITTLE ENERGY: SEVERAL DAYS
2. FEELING DOWN, DEPRESSED OR HOPELESS: NOT AT ALL
9. THOUGHTS THAT YOU WOULD BE BETTER OFF DEAD, OR OF HURTING YOURSELF: NOT AT ALL
5. POOR APPETITE OR OVEREATING: NOT AT ALL
10. IF YOU CHECKED OFF ANY PROBLEMS, HOW DIFFICULT HAVE THESE PROBLEMS MADE IT FOR YOU TO DO YOUR WORK, TAKE CARE OF THINGS AT HOME, OR GET ALONG WITH OTHER PEOPLE: SOMEWHAT DIFFICULT
2. FEELING DOWN, DEPRESSED OR HOPELESS: NOT AT ALL
SUM OF ALL RESPONSES TO PHQ QUESTIONS 1-9: 3
7. TROUBLE CONCENTRATING ON THINGS, SUCH AS READING THE NEWSPAPER OR WATCHING TELEVISION: NOT AT ALL

## 2025-03-26 NOTE — PROGRESS NOTES
safety in the community    **Pateint has been notified: They are to call 911 or go to their nearest E.R. if they are experiencing a medical emergency or suicidal ideations/homicidal ideations.**  All ancillary documentation entered reviewed by provider.      PLEASE NOTE:  This document has been produced in part or whole using voice recognition software. Proofread however unrecognized errors in transcription may be present.    MD Sarah aClvillo, was evaluated through a synchronous (real-time) audio-video encounter. The patient (or guardian if applicable) is aware that this is a billable service, which includes applicable co-pays. This Virtual Visit was conducted with patient's (and/or legal guardian's) consent. Patient identification was verified, and a caregiver was present when appropriate.   The patient was located at Other: work  Provider was located at Facility (Appt Dept): [address] [place] [phone]  Confirm you are appropriately licensed, registered, or certified to deliver care in the state where the patient is located as indicated above. If you are not or unsure, please re-schedule the visit: Yes, I confirm.        Total time spent for this encounter: Not billed by time    --Mallory Borges MD on 3/27/2025 at 9:39 AM    An electronic signature was used to authenticate this note.

## 2025-04-22 ENCOUNTER — OFFICE VISIT (OUTPATIENT)
Dept: OBGYN CLINIC | Age: 35
End: 2025-04-22

## 2025-04-22 ENCOUNTER — PROCEDURE VISIT (OUTPATIENT)
Dept: OBGYN CLINIC | Age: 35
End: 2025-04-22

## 2025-04-22 VITALS
WEIGHT: 211 LBS | SYSTOLIC BLOOD PRESSURE: 118 MMHG | DIASTOLIC BLOOD PRESSURE: 80 MMHG | BODY MASS INDEX: 35.16 KG/M2 | HEIGHT: 65 IN

## 2025-04-22 DIAGNOSIS — Z30.431 IUD CHECK UP: ICD-10-CM

## 2025-04-22 DIAGNOSIS — N93.9 ABNORMAL UTERINE BLEEDING (AUB): Primary | ICD-10-CM

## 2025-04-22 PROCEDURE — 99213 OFFICE O/P EST LOW 20 MIN: CPT | Performed by: OBSTETRICS & GYNECOLOGY

## 2025-04-22 PROCEDURE — 76830 TRANSVAGINAL US NON-OB: CPT | Performed by: OBSTETRICS & GYNECOLOGY

## 2025-04-22 PROCEDURE — 58301 REMOVE INTRAUTERINE DEVICE: CPT | Performed by: OBSTETRICS & GYNECOLOGY

## 2025-04-22 RX ORDER — DROSPIRENONE AND ETHINYL ESTRADIOL 0.02-3(28)
1 KIT ORAL DAILY
Qty: 3 PACKET | Refills: 4 | Status: SHIPPED | OUTPATIENT
Start: 2025-04-22

## 2025-04-22 NOTE — PROGRESS NOTES
Result visit:       Sarah albert Carter is her today to discuss US results which was performed for AUB with IUD (Mirena placed in ). Started continuous lab with some decrease in bleeding, but has not resolved.     Vitals:    25 1035   BP: 118/80        Results/imagin cc uterus with IUD in correct, fundal position   EMS 5 mm    A/P:   AUB  Options discussed. Wants her IUD out today  Will try 7 day pill free interval and then start Caty with monthly w/d bleed. If cycles normalize can try for menstrual suppression, but understands risk of BTB with this as well      Tri Manriquez DO      IUD REMOVAL:    Consent was signed and dated.  A bivalve speculum was placed in the vagina.   The cervix was visualized and the  IUD strings were noted in cervical os.  A Addison was used to grasp the strings and remove the IUD.  The patient was asked to verify removal by visual inspection of the removed IUD.  Once IUD removal verified, the IUD was disposed.  All instruments were then removed form the vagina.  Patient tolerated procedure well.             ASSESSMENT/PLAN:   34 y.o., No obstetric history on file., with desire for IUD removal:     -IUD removed      Tri Manriquez DO

## 2025-05-29 ENCOUNTER — TELEMEDICINE (OUTPATIENT)
Dept: BEHAVIORAL/MENTAL HEALTH CLINIC | Age: 35
End: 2025-05-29
Payer: COMMERCIAL

## 2025-05-29 DIAGNOSIS — F41.1 GENERALIZED ANXIETY DISORDER: ICD-10-CM

## 2025-05-29 DIAGNOSIS — Z65.8 PSYCHOSOCIAL STRESSORS: ICD-10-CM

## 2025-05-29 DIAGNOSIS — F33.42 RECURRENT MAJOR DEPRESSIVE DISORDER, IN FULL REMISSION: Primary | ICD-10-CM

## 2025-05-29 DIAGNOSIS — F90.0 ATTENTION DEFICIT HYPERACTIVITY DISORDER (ADHD), PREDOMINANTLY INATTENTIVE TYPE: ICD-10-CM

## 2025-05-29 DIAGNOSIS — F50.810 MILD BINGE-EATING DISORDER: ICD-10-CM

## 2025-05-29 PROCEDURE — 99214 OFFICE O/P EST MOD 30 MIN: CPT | Performed by: PSYCHIATRY & NEUROLOGY

## 2025-05-29 RX ORDER — TRAZODONE HYDROCHLORIDE 50 MG/1
50-100 TABLET ORAL NIGHTLY
Qty: 60 TABLET | Refills: 2 | Status: CANCELLED | OUTPATIENT
Start: 2025-05-29

## 2025-05-29 RX ORDER — LAMOTRIGINE 100 MG/1
100 TABLET ORAL DAILY
Qty: 90 TABLET | Refills: 1 | Status: SHIPPED | OUTPATIENT
Start: 2025-05-29

## 2025-05-29 RX ORDER — LEVOMEFOLATE CALCIUM 15 MG
15 TABLET ORAL DAILY
Qty: 30 TABLET | Refills: 3 | Status: CANCELLED | OUTPATIENT
Start: 2025-05-29

## 2025-05-29 RX ORDER — DULOXETIN HYDROCHLORIDE 60 MG/1
60 CAPSULE, DELAYED RELEASE ORAL DAILY
Qty: 90 CAPSULE | Refills: 1 | Status: SHIPPED | OUTPATIENT
Start: 2025-05-29

## 2025-05-29 RX ORDER — ALPRAZOLAM 0.5 MG
0.5 TABLET ORAL DAILY PRN
Qty: 30 TABLET | Refills: 1 | Status: SHIPPED | OUTPATIENT
Start: 2025-05-29 | End: 2025-08-27

## 2025-05-29 ASSESSMENT — ANXIETY QUESTIONNAIRES
GAD7 TOTAL SCORE: 0
1. FEELING NERVOUS, ANXIOUS, OR ON EDGE: NOT AT ALL
2. NOT BEING ABLE TO STOP OR CONTROL WORRYING: NOT AT ALL
6. BECOMING EASILY ANNOYED OR IRRITABLE: NOT AT ALL
2. NOT BEING ABLE TO STOP OR CONTROL WORRYING: NOT AT ALL
4. TROUBLE RELAXING: NOT AT ALL
5. BEING SO RESTLESS THAT IT IS HARD TO SIT STILL: NOT AT ALL
1. FEELING NERVOUS, ANXIOUS, OR ON EDGE: NOT AT ALL
6. BECOMING EASILY ANNOYED OR IRRITABLE: NOT AT ALL
IF YOU CHECKED OFF ANY PROBLEMS ON THIS QUESTIONNAIRE, HOW DIFFICULT HAVE THESE PROBLEMS MADE IT FOR YOU TO DO YOUR WORK, TAKE CARE OF THINGS AT HOME, OR GET ALONG WITH OTHER PEOPLE: NOT DIFFICULT AT ALL
3. WORRYING TOO MUCH ABOUT DIFFERENT THINGS: NOT AT ALL
3. WORRYING TOO MUCH ABOUT DIFFERENT THINGS: NOT AT ALL
7. FEELING AFRAID AS IF SOMETHING AWFUL MIGHT HAPPEN: NOT AT ALL
4. TROUBLE RELAXING: NOT AT ALL
7. FEELING AFRAID AS IF SOMETHING AWFUL MIGHT HAPPEN: NOT AT ALL
5. BEING SO RESTLESS THAT IT IS HARD TO SIT STILL: NOT AT ALL
IF YOU CHECKED OFF ANY PROBLEMS ON THIS QUESTIONNAIRE, HOW DIFFICULT HAVE THESE PROBLEMS MADE IT FOR YOU TO DO YOUR WORK, TAKE CARE OF THINGS AT HOME, OR GET ALONG WITH OTHER PEOPLE: NOT DIFFICULT AT ALL

## 2025-05-29 ASSESSMENT — PATIENT HEALTH QUESTIONNAIRE - PHQ9
5. POOR APPETITE OR OVEREATING: NOT AT ALL
2. FEELING DOWN, DEPRESSED OR HOPELESS: NOT AT ALL
5. POOR APPETITE OR OVEREATING: NOT AT ALL
10. IF YOU CHECKED OFF ANY PROBLEMS, HOW DIFFICULT HAVE THESE PROBLEMS MADE IT FOR YOU TO DO YOUR WORK, TAKE CARE OF THINGS AT HOME, OR GET ALONG WITH OTHER PEOPLE: NOT DIFFICULT AT ALL
SUM OF ALL RESPONSES TO PHQ QUESTIONS 1-9: 0
7. TROUBLE CONCENTRATING ON THINGS, SUCH AS READING THE NEWSPAPER OR WATCHING TELEVISION: NOT AT ALL
4. FEELING TIRED OR HAVING LITTLE ENERGY: NOT AT ALL
3. TROUBLE FALLING OR STAYING ASLEEP: NOT AT ALL
1. LITTLE INTEREST OR PLEASURE IN DOING THINGS: NOT AT ALL
10. IF YOU CHECKED OFF ANY PROBLEMS, HOW DIFFICULT HAVE THESE PROBLEMS MADE IT FOR YOU TO DO YOUR WORK, TAKE CARE OF THINGS AT HOME, OR GET ALONG WITH OTHER PEOPLE: NOT DIFFICULT AT ALL
SUM OF ALL RESPONSES TO PHQ QUESTIONS 1-9: 0
SUM OF ALL RESPONSES TO PHQ QUESTIONS 1-9: 0
4. FEELING TIRED OR HAVING LITTLE ENERGY: NOT AT ALL
8. MOVING OR SPEAKING SO SLOWLY THAT OTHER PEOPLE COULD HAVE NOTICED. OR THE OPPOSITE - BEING SO FIDGETY OR RESTLESS THAT YOU HAVE BEEN MOVING AROUND A LOT MORE THAN USUAL: NOT AT ALL
SUM OF ALL RESPONSES TO PHQ QUESTIONS 1-9: 0
SUM OF ALL RESPONSES TO PHQ QUESTIONS 1-9: 0
2. FEELING DOWN, DEPRESSED OR HOPELESS: NOT AT ALL
3. TROUBLE FALLING OR STAYING ASLEEP: NOT AT ALL
1. LITTLE INTEREST OR PLEASURE IN DOING THINGS: NOT AT ALL
8. MOVING OR SPEAKING SO SLOWLY THAT OTHER PEOPLE COULD HAVE NOTICED. OR THE OPPOSITE, BEING SO FIGETY OR RESTLESS THAT YOU HAVE BEEN MOVING AROUND A LOT MORE THAN USUAL: NOT AT ALL
7. TROUBLE CONCENTRATING ON THINGS, SUCH AS READING THE NEWSPAPER OR WATCHING TELEVISION: NOT AT ALL
6. FEELING BAD ABOUT YOURSELF - OR THAT YOU ARE A FAILURE OR HAVE LET YOURSELF OR YOUR FAMILY DOWN: NOT AT ALL
9. THOUGHTS THAT YOU WOULD BE BETTER OFF DEAD, OR OF HURTING YOURSELF: NOT AT ALL
6. FEELING BAD ABOUT YOURSELF - OR THAT YOU ARE A FAILURE OR HAVE LET YOURSELF OR YOUR FAMILY DOWN: NOT AT ALL
9. THOUGHTS THAT YOU WOULD BE BETTER OFF DEAD, OR OF HURTING YOURSELF: NOT AT ALL

## 2025-05-29 NOTE — PROGRESS NOTES
Patient:  Sarah Carter  Age:  35 y.o.  :  1990     SEX:  female MRN:  [id number]     RACE: White (non-)     SEEN:  [x]  PATIENT  []  SPOUSE []  OTHER:                  2025     8:56 AM 3/26/2025     8:03 AM 2025    10:11 AM   PHQ-9    Little interest or pleasure in doing things 0 0 0   Feeling down, depressed, or hopeless 0 0 0   Trouble falling or staying asleep, or sleeping too much 0 2 0   Feeling tired or having little energy 0 1 0   Poor appetite or overeating 0 0 2   Feeling bad about yourself - or that you are a failure or have let yourself or your family down 0 0 0   Trouble concentrating on things, such as reading the newspaper or watching television 0 0 0   Moving or speaking so slowly that other people could have noticed. Or the opposite - being so fidgety or restless that you have been moving around a lot more than usual 0 0 0   Thoughts that you would be better off dead, or of hurting yourself in some way 0 0 0   PHQ-2 Score 0  0  0    PHQ-9 Total Score 0  3  2    If you checked off any problems, how difficult have these problems made it for you to do your work, take care of things at home, or get along with other people? 0 1 0       Patient-reported           2025     8:55 AM 3/26/2025     8:01 AM 2025    10:10 AM   ERNA-7 SCREENING   Feeling nervous, anxious, or on edge Not at all Not at all Not at all   Not being able to stop or control worrying Not at all Not at all Not at all   Worrying too much about different things Not at all Not at all Not at all   Trouble relaxing Not at all Several days Not at all   Being so restless that it is hard to sit still Not at all Not at all Not at all   Becoming easily annoyed or irritable Not at all Several days Not at all   Feeling afraid as if something awful might happen Not at all Not at all Not at all   ERNA-7 Total Score 0  2  0    How difficult have these problems made it for you to do your work, take

## 2025-09-04 ENCOUNTER — OFFICE VISIT (OUTPATIENT)
Dept: BEHAVIORAL/MENTAL HEALTH CLINIC | Age: 35
End: 2025-09-04
Payer: COMMERCIAL

## 2025-09-04 VITALS
RESPIRATION RATE: 17 BRPM | OXYGEN SATURATION: 98 % | HEART RATE: 79 BPM | DIASTOLIC BLOOD PRESSURE: 80 MMHG | HEIGHT: 65 IN | BODY MASS INDEX: 34.16 KG/M2 | WEIGHT: 205 LBS | TEMPERATURE: 98 F | SYSTOLIC BLOOD PRESSURE: 112 MMHG

## 2025-09-04 DIAGNOSIS — F51.02 ADJUSTMENT INSOMNIA: ICD-10-CM

## 2025-09-04 DIAGNOSIS — F41.1 GENERALIZED ANXIETY DISORDER: ICD-10-CM

## 2025-09-04 DIAGNOSIS — Z65.8 PSYCHOSOCIAL STRESSORS: ICD-10-CM

## 2025-09-04 DIAGNOSIS — F90.0 ATTENTION DEFICIT HYPERACTIVITY DISORDER (ADHD), PREDOMINANTLY INATTENTIVE TYPE: ICD-10-CM

## 2025-09-04 DIAGNOSIS — F33.42 RECURRENT MAJOR DEPRESSIVE DISORDER, IN FULL REMISSION: Primary | ICD-10-CM

## 2025-09-04 DIAGNOSIS — F50.810 MILD BINGE-EATING DISORDER: ICD-10-CM

## 2025-09-04 PROCEDURE — 99214 OFFICE O/P EST MOD 30 MIN: CPT | Performed by: PSYCHIATRY & NEUROLOGY

## 2025-09-04 RX ORDER — ALPRAZOLAM 0.5 MG
0.5 TABLET ORAL NIGHTLY PRN
COMMUNITY
End: 2025-09-04 | Stop reason: SDUPTHER

## 2025-09-04 RX ORDER — SEMAGLUTIDE 0.5 MG/.5ML
INJECTION, SOLUTION SUBCUTANEOUS
COMMUNITY
Start: 2025-08-29

## 2025-09-04 RX ORDER — ALPRAZOLAM 0.5 MG
0.5 TABLET ORAL NIGHTLY PRN
Qty: 30 TABLET | Refills: 1 | Status: SHIPPED | OUTPATIENT
Start: 2025-09-04 | End: 2026-01-02

## 2025-09-04 RX ORDER — TRAZODONE HYDROCHLORIDE 50 MG/1
50-100 TABLET ORAL NIGHTLY
Qty: 90 TABLET | Refills: 0 | Status: SHIPPED | OUTPATIENT
Start: 2025-09-04

## 2025-09-04 RX ORDER — DULOXETIN HYDROCHLORIDE 60 MG/1
60 CAPSULE, DELAYED RELEASE ORAL DAILY
Qty: 90 CAPSULE | Refills: 1 | Status: SHIPPED | OUTPATIENT
Start: 2025-09-04

## 2025-09-04 RX ORDER — LAMOTRIGINE 100 MG/1
100 TABLET ORAL DAILY
Qty: 90 TABLET | Refills: 1 | Status: SHIPPED | OUTPATIENT
Start: 2025-09-04

## 2025-09-04 RX ORDER — LEVOMEFOLATE CALCIUM 15 MG
15 TABLET ORAL DAILY
Qty: 30 TABLET | Refills: 3 | Status: CANCELLED | OUTPATIENT
Start: 2025-09-04

## 2025-09-04 ASSESSMENT — PATIENT HEALTH QUESTIONNAIRE - PHQ9
9. THOUGHTS THAT YOU WOULD BE BETTER OFF DEAD, OR OF HURTING YOURSELF: NOT AT ALL
4. FEELING TIRED OR HAVING LITTLE ENERGY: NOT AT ALL
SUM OF ALL RESPONSES TO PHQ QUESTIONS 1-9: 0
6. FEELING BAD ABOUT YOURSELF - OR THAT YOU ARE A FAILURE OR HAVE LET YOURSELF OR YOUR FAMILY DOWN: NOT AT ALL
SUM OF ALL RESPONSES TO PHQ QUESTIONS 1-9: 0
2. FEELING DOWN, DEPRESSED OR HOPELESS: NOT AT ALL
8. MOVING OR SPEAKING SO SLOWLY THAT OTHER PEOPLE COULD HAVE NOTICED. OR THE OPPOSITE, BEING SO FIGETY OR RESTLESS THAT YOU HAVE BEEN MOVING AROUND A LOT MORE THAN USUAL: NOT AT ALL
5. POOR APPETITE OR OVEREATING: NOT AT ALL
3. TROUBLE FALLING OR STAYING ASLEEP: NOT AT ALL
1. LITTLE INTEREST OR PLEASURE IN DOING THINGS: NOT AT ALL
7. TROUBLE CONCENTRATING ON THINGS, SUCH AS READING THE NEWSPAPER OR WATCHING TELEVISION: NOT AT ALL
10. IF YOU CHECKED OFF ANY PROBLEMS, HOW DIFFICULT HAVE THESE PROBLEMS MADE IT FOR YOU TO DO YOUR WORK, TAKE CARE OF THINGS AT HOME, OR GET ALONG WITH OTHER PEOPLE: NOT DIFFICULT AT ALL
SUM OF ALL RESPONSES TO PHQ QUESTIONS 1-9: 0
SUM OF ALL RESPONSES TO PHQ QUESTIONS 1-9: 0

## (undated) DEVICE — SUTURE PERMAHAND SZ 2-0 L12X18IN NONABSORBABLE BLK SILK A185H

## (undated) DEVICE — MASTISOL ADHESIVE LIQ 2/3ML

## (undated) DEVICE — SUTURE VCRL SZ 3-0 L27IN ABSRB UD L26MM SH 1/2 CIR J416H

## (undated) DEVICE — SOLUTION IRRIG 1000ML 09% SOD CHL USP PIC PLAS CONTAINER

## (undated) DEVICE — SUTURE VCRL SZ 4-0 L27IN ABSRB UD L19MM PS-2 3/8 CIR PRIM J426H

## (undated) DEVICE — STRIP,CLOSURE,WOUND,MEDI-STRIP,1/2X4: Brand: MEDLINE

## (undated) DEVICE — SPONGE: SPECIALTY PEANUT XR 100/CS: Brand: MEDICAL ACTION INDUSTRIES

## (undated) DEVICE — RESERVOIR,SUCTION,100CC,SILICONE: Brand: MEDLINE

## (undated) DEVICE — SUT PROL 2-0 30IN CT2 BLU --

## (undated) DEVICE — NEEDLE HYPO 21GA L1.5IN INTRAMUSCULAR S STL LATCH BVL UP

## (undated) DEVICE — MINOR SPLIT GENERAL: Brand: MEDLINE INDUSTRIES, INC.